# Patient Record
Sex: FEMALE | Race: WHITE | HISPANIC OR LATINO | URBAN - METROPOLITAN AREA
[De-identification: names, ages, dates, MRNs, and addresses within clinical notes are randomized per-mention and may not be internally consistent; named-entity substitution may affect disease eponyms.]

---

## 2019-09-25 LAB
EXTERNAL HIV CONFIRMATION: NORMAL
EXTERNAL HIV SCREEN: NORMAL

## 2021-06-03 PROBLEM — R31.29 MICROSCOPIC HEMATURIA: Status: ACTIVE | Noted: 2019-10-09

## 2021-06-03 PROBLEM — E55.9 VITAMIN D DEFICIENCY: Status: ACTIVE | Noted: 2019-09-25

## 2021-06-03 PROBLEM — E78.49 OTHER HYPERLIPIDEMIA: Status: ACTIVE | Noted: 2021-06-03

## 2021-06-03 PROBLEM — R76.8 POSITIVE ANA (ANTINUCLEAR ANTIBODY): Status: ACTIVE | Noted: 2019-10-09

## 2021-06-03 NOTE — PROGRESS NOTES
Assessment/Plan:  Problem List Items Addressed This Visit        Digestive    GERD (gastroesophageal reflux disease)     Stable s meds  Watch GERD diet  Endocrine    IFG (impaired fasting glucose)     Pending labs  Relevant Orders    Comprehensive metabolic panel (Completed)    Hemoglobin A1C (Completed)       Musculoskeletal and Integument    Psoriasis     Stable on Triamcinolone cream PRN  Relevant Medications    clobetasol (OLUX) 0 05 % topical foam    triamcinolone (KENALOG) 0 1 % cream    Other Relevant Orders    Vitamin D 25 hydroxy (Completed)    Scalp psoriasis     Scalp Psoriasis - Stable on Clobetasol foam PRN  Relevant Medications    clobetasol (OLUX) 0 05 % topical foam    triamcinolone (KENALOG) 0 1 % cream    Osteoarthritis     Stable s meds  Genitourinary    Microscopic hematuria     Pending labs  To consider Uro consult if recurrence? Relevant Orders    CBC and differential (Completed)    Urine culture    Urinalysis with microscopic       Other    Vitamin D deficiency     Pending labs  Relevant Orders    Comprehensive metabolic panel (Completed)    Vitamin D 25 hydroxy (Completed)    Generalized anxiety disorder       Patient is mourning the recent death of her mother  She is stable without mood medications or counseling  Smart phone jack list and counseling list provided today  Relevant Orders    TSH, 3rd generation with Free T4 reflex (Completed)    Other hyperlipidemia     Pending labs  Recommend lifestyle modifications  Relevant Orders    CBC and differential (Completed)    Comprehensive metabolic panel (Completed)    Lipid panel (Completed)    TSH, 3rd generation with Free T4 reflex (Completed)    LDL cholesterol, direct (Completed)    Obesity     Recommend lifestyle modifications            Relevant Orders    Ambulatory referral to General Surgery    TSH, 3rd generation with Free T4 reflex (Completed)    Positive HANH (antinuclear antibody)     Pending labs  To consider Rheum consult considering psoriasis and OA? Relevant Orders    HANH Screen w/ Reflex to Titer/Pattern    C-reactive protein (Completed)    Cyclic citrul peptide antibody, IgG    RF Screen w/ Reflex to Titer    Uric acid (Completed)      Other Visit Diagnoses     Annual physical exam    -  Primary    Adjustment disorder with depressed mood        Relevant Orders    CBC and differential (Completed)    TSH, 3rd generation with Free T4 reflex (Completed)    Encounter for screening mammogram for breast cancer        Relevant Orders    Mammo screening bilateral w 3d & cad    Screening for cervical cancer        Relevant Orders    Ambulatory referral to Obstetrics / Gynecology    Screening for diabetes mellitus        Relevant Orders    Hemoglobin A1C (Completed)    LDL cholesterol, direct (Completed)    Screening for cardiovascular condition        Relevant Orders    CBC and differential (Completed)    Comprehensive metabolic panel (Completed)    Lipid panel (Completed)    Need for hepatitis C screening test        Relevant Orders    Hepatitis C antibody (Completed)    Obesity (BMI 35 0-39 9 without comorbidity)        Relevant Orders    Ambulatory referral to General Surgery    TSH, 3rd generation with Free T4 reflex (Completed)    Severe obesity (BMI 35 0-39  9) with comorbidity ([address] )        Relevant Orders    Ambulatory referral to General Surgery    TSH, 3rd generation with Free T4 reflex (Completed)    BMI 39 0-39 9,adult        Relevant Orders    Ambulatory referral to General Surgery    TSH, 3rd generation with Free T4 reflex (Completed)    Gallstones        Relevant Orders    Ambulatory referral to General Surgery    RUQ abdominal pain        Relevant Orders    Ambulatory referral to General Surgery           Return in about 6 weeks (around 7/16/2021) for Recheck IFG, HL, Adjust D/O, Anxiety, Vit D Def, Micro Hematuria, HANH+, Obesity, Labs  Future Appointments   Date Time Provider Mirna Jerome   7/16/2021  8:40 AM Mukesh Ray DO [facility]        Subjective:     Celeste Payne is a 52 y o  female who presents today as a new patient for her medical conditions  New Patient    Previous PCP:  Dr Jacqueline Sanchez at [address]  Reason for Transfer:  Preference  Last seen by previous PCP:  12/4/20  Last Labs:  9/25/19  Last Physical:  Unsure  Medical Records Requested:   No      HPI:  Chief Complaint   Patient presents with    New Patient Visit     no concerns     Annual Exam     -- Above per clinical staff and reviewed  --      HPI      Today:      Controlled Substance Review    [place]MP or [place]  reviewed: No red flags were identified; safe to proceed with prescription  Gallstones / Fatty Liver - Seen on U/S 4/28/21 at [facility] - ordered by [facility]   Also has fatty liver  [facility] advised that patient see a General Surgeon  Had RUQ mid 4/21  Now feels "raw" in RUQ, denies pain  Previously she had RUQ pain c laying supine, walking, pain radiated to spine  Trying to avoid fried and fatty foods  Obesity - Trying to watch diet  No regular exercise  Hyperlipidemia - No statin previously  GERD -  Stable, except for late night eating  Uses OTC acid reducer PRN  IFG / H/O GDM - Diet-controlled  No meds previously  OA - Knees and Ankles - Stable s OTC meds  Anxiety -  Patient states mood was stable until her mother passed away 5/12/21  Good social supports  No SI/HI/AH/VH  No mood meds or counseling previously        PHQ-9 Depression Screening    PHQ-9:   Frequency of the following problems over the past two weeks:      Little interest or pleasure in doing things: 0 - not at all  Feeling down, depressed, or hopeless: 1 - several days  Trouble falling or staying asleep, or sleeping too much: 1 - several days  Feeling tired or having little energy: 0 - not at all  Poor appetite or overeatin - not at all  Feeling bad about yourself - or that you are a failure or have let yourself or your family down: 0 - not at all  Trouble concentrating on things, such as reading the newspaper or watching television: 0 - not at all  Moving or speaking so slowly that other people could have noticed  Or the opposite - being so fidgety or restless that you have been moving around a lot more than usual: 0 - not at all  Thoughts that you would be better off dead, or of hurting yourself in some way: 0 - not at all  PHQ-2 Score: 1  PHQ-9 Score: 2         GRACIELA-7 Flowsheet Screening      Most Recent Value   Over the last two weeks, how often have you been bothered by the following problems? Feeling nervous, anxious, or on edge  0   Not being able to stop or control worrying  0   Worrying too much about different things  0   Trouble relaxing   0   Being so restless that it's hard to sit still  0   Becoming easily annoyed or irritable   0   Feeling afraid as if something awful might happen  0   How difficult have these problems made it for you to do your work, take care of things at home, or get along with other people? Not difficult at all   GRACIELA Score   0        MDQ:  0    Vitamin D Deficiency - s/p Drisdol  No MVI and OTC Vitamin C currently  +HANH - No Rheum consult previously  Psoriasis - Management per PCP  Has not seen Derm in years  Behind ears and B/L nares  Stable on Triamcinolone cream PRN  Scalp Psoriasis - Management per PCP  Has not seen Derm in years  Stable on Clobetasol foam PRN  Microscopic Hematuria - No Uro evaluation previously  Reviewed:  Labs 19    No Gyn  Last saw Gyn years ago  No abnormal Paps in the past       Sees Dentist q6 months  Sees Optho q2 years            The following portions of the patient's history were reviewed and updated as appropriate: allergies, current medications, past family history, past medical history, past social history, past surgical history and problem list       Review of Systems   Constitutional: Negative for appetite change, chills, diaphoresis, fatigue and fever  Respiratory: Negative for chest tightness and shortness of breath  Cardiovascular: Negative for chest pain  Gastrointestinal: Negative for abdominal pain, blood in stool, diarrhea, nausea and vomiting  Genitourinary: Negative for dysuria  Current Outpatient Medications   Medication Sig Dispense Refill    clobetasol (OLUX) 0 05 % topical foam Apply 1 application topically 2 (two) times a day as needed For Scalp Psoriasis   triamcinolone (KENALOG) 0 1 % cream Apply 1 application topically 2 (two) times a day as needed        No current facility-administered medications for this visit  Objective:  /68   Pulse 73   Temp 97 5 °F (36 4 °C)   Resp 14   Ht 5' 4 45" (1 637 m)   Wt 105 kg (231 lb 12 8 oz)   SpO2 99%   BMI 39 24 kg/m²    Wt Readings from Last 3 Encounters:   06/04/21 105 kg (231 lb 12 8 oz)      BP Readings from Last 3 Encounters:   06/04/21 126/68          Physical Exam  Vitals signs and nursing note reviewed  Constitutional:       Appearance: Normal appearance  She is well-developed  She is obese  HENT:      Head: Normocephalic and atraumatic  Right Ear: Tympanic membrane, ear canal and external ear normal       Left Ear: Tympanic membrane, ear canal and external ear normal       Nose: Nose normal       Right Sinus: No maxillary sinus tenderness or frontal sinus tenderness  Left Sinus: No maxillary sinus tenderness or frontal sinus tenderness  Mouth/Throat:      Mouth: Mucous membranes are moist       Pharynx: Oropharynx is clear  Uvula midline  Tonsils: No tonsillar exudate  Eyes:      Extraocular Movements: Extraocular movements intact  Conjunctiva/sclera: Conjunctivae normal       Pupils: Pupils are equal, round, and reactive to light     Neck: Musculoskeletal: Neck supple  Cardiovascular:      Rate and Rhythm: Normal rate and regular rhythm  Pulses: Normal pulses  Heart sounds: Normal heart sounds  Pulmonary:      Effort: Pulmonary effort is normal       Breath sounds: Normal breath sounds  Abdominal:      General: Bowel sounds are normal  There is no distension  Palpations: Abdomen is soft  There is no mass  Tenderness: There is no abdominal tenderness  There is no right CVA tenderness, left CVA tenderness, guarding or rebound  Negative signs include Rowland's sign  Musculoskeletal:         General: No swelling or tenderness  Right lower leg: No edema  Left lower leg: No edema  Lymphadenopathy:      Cervical: No cervical adenopathy  Skin:     Findings: No rash  Neurological:      General: No focal deficit present  Mental Status: She is alert and oriented to person, place, and time  Psychiatric:         Mood and Affect: Mood normal          Behavior: Behavior normal          Thought Content: Thought content normal          Judgment: Judgment normal          Lab Results:      Lab Results   Component Value Date    WBC 6 50 06/04/2021    HGB 11 5 06/04/2021    HCT 36 7 06/04/2021     06/04/2021    TRIG 105 06/04/2021    HDL 58 06/04/2021    LDLDIRECT 165 (H) 06/04/2021    ALT 41 06/04/2021    AST 23 06/04/2021    K 4 0 06/04/2021     06/04/2021    CREATININE 0 74 06/04/2021    BUN 7 06/04/2021    CO2 24 06/04/2021    GLUF 92 06/04/2021    HGBA1C 5 6 06/04/2021     Lab Results   Component Value Date    URICACID 5 9 06/04/2021     Invalid input(s): BASENAME Vitamin D    No results found  POCT Labs      BMI Counseling: Body mass index is 39 24 kg/m²  The BMI is above normal  Nutrition recommendations include encouraging healthy choices of fruits and vegetables  Exercise recommendations include exercising 3-5 times per week  No pharmacotherapy was ordered         Depression Screening and Follow-up Plan: Patient's depression screening was positive with a PHQ-2 score of 1  Their PHQ-9 score was 2  Patient advised to follow-up with PCP for further management

## 2021-06-04 ENCOUNTER — TELEPHONE (OUTPATIENT)
Dept: ADMINISTRATIVE | Facility: OTHER | Age: 51
End: 2021-06-04

## 2021-06-04 ENCOUNTER — OFFICE VISIT (OUTPATIENT)
Dept: FAMILY MEDICINE CLINIC | Facility: CLINIC | Age: 51
End: 2021-06-04
Payer: COMMERCIAL

## 2021-06-04 ENCOUNTER — LAB (OUTPATIENT)
Dept: LAB | Facility: AMBULARY SURGERY CENTER | Age: 51
End: 2021-06-04
Payer: COMMERCIAL

## 2021-06-04 VITALS
SYSTOLIC BLOOD PRESSURE: 126 MMHG | HEART RATE: 73 BPM | DIASTOLIC BLOOD PRESSURE: 68 MMHG | TEMPERATURE: 97.5 F | OXYGEN SATURATION: 99 % | RESPIRATION RATE: 14 BRPM | WEIGHT: 231.8 LBS | HEIGHT: 64 IN | BODY MASS INDEX: 39.57 KG/M2

## 2021-06-04 DIAGNOSIS — F41.1 GENERALIZED ANXIETY DISORDER: ICD-10-CM

## 2021-06-04 DIAGNOSIS — F43.21 ADJUSTMENT DISORDER WITH DEPRESSED MOOD: ICD-10-CM

## 2021-06-04 DIAGNOSIS — E66.9 OBESITY (BMI 35.0-39.9 WITHOUT COMORBIDITY): ICD-10-CM

## 2021-06-04 DIAGNOSIS — Z11.59 NEED FOR HEPATITIS C SCREENING TEST: ICD-10-CM

## 2021-06-04 DIAGNOSIS — Z12.31 ENCOUNTER FOR SCREENING MAMMOGRAM FOR BREAST CANCER: ICD-10-CM

## 2021-06-04 DIAGNOSIS — K21.9 GASTROESOPHAGEAL REFLUX DISEASE, UNSPECIFIED WHETHER ESOPHAGITIS PRESENT: ICD-10-CM

## 2021-06-04 DIAGNOSIS — E66.01 CLASS 2 SEVERE OBESITY WITH SERIOUS COMORBIDITY AND BODY MASS INDEX (BMI) OF 39.0 TO 39.9 IN ADULT, UNSPECIFIED OBESITY TYPE (HCC): ICD-10-CM

## 2021-06-04 DIAGNOSIS — E55.9 VITAMIN D DEFICIENCY: ICD-10-CM

## 2021-06-04 DIAGNOSIS — R31.29 MICROSCOPIC HEMATURIA: ICD-10-CM

## 2021-06-04 DIAGNOSIS — Z00.00 ANNUAL PHYSICAL EXAM: Primary | ICD-10-CM

## 2021-06-04 DIAGNOSIS — K80.20 GALLSTONES: ICD-10-CM

## 2021-06-04 DIAGNOSIS — M15.9 OSTEOARTHRITIS OF MULTIPLE JOINTS, UNSPECIFIED OSTEOARTHRITIS TYPE: ICD-10-CM

## 2021-06-04 DIAGNOSIS — L40.9 PSORIASIS: ICD-10-CM

## 2021-06-04 DIAGNOSIS — E66.01 SEVERE OBESITY (BMI 35.0-39.9) WITH COMORBIDITY (HCC): ICD-10-CM

## 2021-06-04 DIAGNOSIS — E78.49 OTHER HYPERLIPIDEMIA: ICD-10-CM

## 2021-06-04 DIAGNOSIS — Z13.1 SCREENING FOR DIABETES MELLITUS: ICD-10-CM

## 2021-06-04 DIAGNOSIS — L40.9 SCALP PSORIASIS: ICD-10-CM

## 2021-06-04 DIAGNOSIS — Z13.6 SCREENING FOR CARDIOVASCULAR CONDITION: ICD-10-CM

## 2021-06-04 DIAGNOSIS — Z12.4 SCREENING FOR CERVICAL CANCER: ICD-10-CM

## 2021-06-04 DIAGNOSIS — R10.11 RUQ ABDOMINAL PAIN: ICD-10-CM

## 2021-06-04 DIAGNOSIS — R76.8 POSITIVE ANA (ANTINUCLEAR ANTIBODY): ICD-10-CM

## 2021-06-04 DIAGNOSIS — R73.01 IFG (IMPAIRED FASTING GLUCOSE): ICD-10-CM

## 2021-06-04 LAB
25(OH)D3 SERPL-MCNC: 18.2 NG/ML (ref 30–100)
ALBUMIN SERPL BCP-MCNC: 4.1 G/DL (ref 3.5–5)
ALP SERPL-CCNC: 106 U/L (ref 46–116)
ALT SERPL W P-5'-P-CCNC: 41 U/L (ref 12–78)
ANION GAP SERPL CALCULATED.3IONS-SCNC: 6 MMOL/L (ref 4–13)
AST SERPL W P-5'-P-CCNC: 23 U/L (ref 5–45)
BACTERIA UR QL AUTO: NORMAL /HPF
BASOPHILS # BLD AUTO: 0.04 THOUSANDS/ΜL (ref 0–0.1)
BASOPHILS NFR BLD AUTO: 1 % (ref 0–1)
BILIRUB SERPL-MCNC: 0.6 MG/DL (ref 0.2–1)
BILIRUB UR QL STRIP: NEGATIVE
BUN SERPL-MCNC: 7 MG/DL (ref 5–25)
CALCIUM SERPL-MCNC: 9.1 MG/DL (ref 8.3–10.1)
CHLORIDE SERPL-SCNC: 108 MMOL/L (ref 100–108)
CHOLEST SERPL-MCNC: 241 MG/DL (ref 50–200)
CLARITY UR: CLEAR
CO2 SERPL-SCNC: 24 MMOL/L (ref 21–32)
COLOR UR: YELLOW
CREAT SERPL-MCNC: 0.74 MG/DL (ref 0.6–1.3)
CRP SERPL QL: 6.5 MG/L
EOSINOPHIL # BLD AUTO: 0.32 THOUSAND/ΜL (ref 0–0.61)
EOSINOPHIL NFR BLD AUTO: 5 % (ref 0–6)
ERYTHROCYTE [DISTWIDTH] IN BLOOD BY AUTOMATED COUNT: 16.1 % (ref 11.6–15.1)
EST. AVERAGE GLUCOSE BLD GHB EST-MCNC: 114 MG/DL
GFR SERPL CREATININE-BSD FRML MDRD: 96 ML/MIN/1.73SQ M
GLUCOSE P FAST SERPL-MCNC: 92 MG/DL (ref 65–99)
GLUCOSE UR STRIP-MCNC: NEGATIVE MG/DL
HBA1C MFR BLD: 5.6 %
HCT VFR BLD AUTO: 36.7 % (ref 34.8–46.1)
HCV AB SER QL: NORMAL
HDLC SERPL-MCNC: 58 MG/DL
HGB BLD-MCNC: 11.5 G/DL (ref 11.5–15.4)
HGB UR QL STRIP.AUTO: NEGATIVE
HYALINE CASTS #/AREA URNS LPF: NORMAL /LPF
IMM GRANULOCYTES # BLD AUTO: 0.01 THOUSAND/UL (ref 0–0.2)
IMM GRANULOCYTES NFR BLD AUTO: 0 % (ref 0–2)
KETONES UR STRIP-MCNC: NEGATIVE MG/DL
LDLC SERPL CALC-MCNC: 162 MG/DL (ref 0–100)
LDLC SERPL DIRECT ASSAY-MCNC: 165 MG/DL (ref 0–100)
LEUKOCYTE ESTERASE UR QL STRIP: NEGATIVE
LYMPHOCYTES # BLD AUTO: 2.41 THOUSANDS/ΜL (ref 0.6–4.47)
LYMPHOCYTES NFR BLD AUTO: 37 % (ref 14–44)
MCH RBC QN AUTO: 27.5 PG (ref 26.8–34.3)
MCHC RBC AUTO-ENTMCNC: 31.3 G/DL (ref 31.4–37.4)
MCV RBC AUTO: 88 FL (ref 82–98)
MONOCYTES # BLD AUTO: 0.38 THOUSAND/ΜL (ref 0.17–1.22)
MONOCYTES NFR BLD AUTO: 6 % (ref 4–12)
NEUTROPHILS # BLD AUTO: 3.34 THOUSANDS/ΜL (ref 1.85–7.62)
NEUTS SEG NFR BLD AUTO: 51 % (ref 43–75)
NITRITE UR QL STRIP: NEGATIVE
NON-SQ EPI CELLS URNS QL MICRO: NORMAL /HPF
NONHDLC SERPL-MCNC: 183 MG/DL
NRBC BLD AUTO-RTO: 0 /100 WBCS
PH UR STRIP.AUTO: 7 [PH]
PLATELET # BLD AUTO: 351 THOUSANDS/UL (ref 149–390)
PMV BLD AUTO: 9.7 FL (ref 8.9–12.7)
POTASSIUM SERPL-SCNC: 4 MMOL/L (ref 3.5–5.3)
PROT SERPL-MCNC: 8.2 G/DL (ref 6.4–8.2)
PROT UR STRIP-MCNC: NEGATIVE MG/DL
RBC # BLD AUTO: 4.18 MILLION/UL (ref 3.81–5.12)
RBC #/AREA URNS AUTO: NORMAL /HPF
SODIUM SERPL-SCNC: 138 MMOL/L (ref 136–145)
SP GR UR STRIP.AUTO: 1 (ref 1–1.03)
TRIGL SERPL-MCNC: 105 MG/DL
TSH SERPL DL<=0.05 MIU/L-ACNC: 1.26 UIU/ML (ref 0.36–3.74)
URATE SERPL-MCNC: 5.9 MG/DL (ref 2–6.8)
UROBILINOGEN UR QL STRIP.AUTO: 0.2 E.U./DL
WBC # BLD AUTO: 6.5 THOUSAND/UL (ref 4.31–10.16)
WBC #/AREA URNS AUTO: NORMAL /HPF

## 2021-06-04 PROCEDURE — 3725F SCREEN DEPRESSION PERFORMED: CPT | Performed by: FAMILY MEDICINE

## 2021-06-04 PROCEDURE — 87086 URINE CULTURE/COLONY COUNT: CPT | Performed by: FAMILY MEDICINE

## 2021-06-04 PROCEDURE — 86430 RHEUMATOID FACTOR TEST QUAL: CPT

## 2021-06-04 PROCEDURE — 83036 HEMOGLOBIN GLYCOSYLATED A1C: CPT

## 2021-06-04 PROCEDURE — 83721 ASSAY OF BLOOD LIPOPROTEIN: CPT

## 2021-06-04 PROCEDURE — 86140 C-REACTIVE PROTEIN: CPT

## 2021-06-04 PROCEDURE — 85025 COMPLETE CBC W/AUTO DIFF WBC: CPT

## 2021-06-04 PROCEDURE — 86038 ANTINUCLEAR ANTIBODIES: CPT

## 2021-06-04 PROCEDURE — 1036F TOBACCO NON-USER: CPT | Performed by: FAMILY MEDICINE

## 2021-06-04 PROCEDURE — 82306 VITAMIN D 25 HYDROXY: CPT

## 2021-06-04 PROCEDURE — 86803 HEPATITIS C AB TEST: CPT

## 2021-06-04 PROCEDURE — 81001 URINALYSIS AUTO W/SCOPE: CPT | Performed by: FAMILY MEDICINE

## 2021-06-04 PROCEDURE — 80061 LIPID PANEL: CPT

## 2021-06-04 PROCEDURE — 99203 OFFICE O/P NEW LOW 30 MIN: CPT | Performed by: FAMILY MEDICINE

## 2021-06-04 PROCEDURE — 36415 COLL VENOUS BLD VENIPUNCTURE: CPT

## 2021-06-04 PROCEDURE — 99386 PREV VISIT NEW AGE 40-64: CPT | Performed by: FAMILY MEDICINE

## 2021-06-04 PROCEDURE — 84550 ASSAY OF BLOOD/URIC ACID: CPT

## 2021-06-04 PROCEDURE — 86200 CCP ANTIBODY: CPT

## 2021-06-04 PROCEDURE — 84443 ASSAY THYROID STIM HORMONE: CPT

## 2021-06-04 PROCEDURE — 80053 COMPREHEN METABOLIC PANEL: CPT

## 2021-06-04 RX ORDER — TRIAMCINOLONE ACETONIDE 1 MG/G
1 CREAM TOPICAL 2 TIMES DAILY PRN
COMMUNITY

## 2021-06-04 RX ORDER — CLOBETASOL PROPIONATE 0.5 MG/G
1 AEROSOL, FOAM TOPICAL 2 TIMES DAILY PRN
COMMUNITY
Start: 2021-05-24 | End: 2021-07-16 | Stop reason: SDUPTHER

## 2021-06-04 NOTE — RESULT ENCOUNTER NOTE
Normal urinalysis  No blood seen  Urine culture is still pending      Message sent to patient via [employer] patient portal

## 2021-06-04 NOTE — RESULT ENCOUNTER NOTE
Unstable labs - will review with patient at upcoming appointment  Hyperlipidemia - Recommend lifestyle modifications  10 year ASCVD risk is 1 2%  +CRP - Patient c H/O +HANH, Psoriasis, OA  To consider Rheum consult?

## 2021-06-04 NOTE — ASSESSMENT & PLAN NOTE
Patient is mourning the recent death of her mother  She is stable without mood medications or counseling  Smart phone jack list and counseling list provided today

## 2021-06-04 NOTE — PROGRESS NOTES
Assessment/Plan:  Problem List Items Addressed This Visit        Digestive    GERD (gastroesophageal reflux disease)     Stable s meds  Watch GERD diet  Endocrine    IFG (impaired fasting glucose)     Pending labs  Relevant Orders    Comprehensive metabolic panel (Completed)    Hemoglobin A1C (Completed)       Musculoskeletal and Integument    Psoriasis     Stable on Triamcinolone cream PRN  Relevant Medications    clobetasol (OLUX) 0 05 % topical foam    triamcinolone (KENALOG) 0 1 % cream    Other Relevant Orders    Vitamin D 25 hydroxy (Completed)    Scalp psoriasis     Scalp Psoriasis - Stable on Clobetasol foam PRN  Relevant Medications    clobetasol (OLUX) 0 05 % topical foam    triamcinolone (KENALOG) 0 1 % cream    Osteoarthritis     Stable s meds  Genitourinary    Microscopic hematuria     Pending labs  To consider Uro consult if recurrence? Relevant Orders    CBC and differential (Completed)    Urine culture    Urinalysis with microscopic       Other    Vitamin D deficiency     Pending labs  Relevant Orders    Comprehensive metabolic panel (Completed)    Vitamin D 25 hydroxy (Completed)    Generalized anxiety disorder       Patient is mourning the recent death of her mother  She is stable without mood medications or counseling  Smart phone jack list and counseling list provided today  Relevant Orders    TSH, 3rd generation with Free T4 reflex (Completed)    Other hyperlipidemia     Pending labs  Recommend lifestyle modifications  Relevant Orders    CBC and differential (Completed)    Comprehensive metabolic panel (Completed)    Lipid panel (Completed)    TSH, 3rd generation with Free T4 reflex (Completed)    LDL cholesterol, direct (Completed)    Obesity     Recommend lifestyle modifications            Relevant Orders    Ambulatory referral to General Surgery    TSH, 3rd generation with Free T4 reflex (Completed)    Positive HANH (antinuclear antibody)     Pending labs  To consider Rheum consult considering psoriasis and OA? Relevant Orders    HANH Screen w/ Reflex to Titer/Pattern    C-reactive protein (Completed)    Cyclic citrul peptide antibody, IgG    RF Screen w/ Reflex to Titer    Uric acid (Completed)      Other Visit Diagnoses     Annual physical exam    -  Primary    Adjustment disorder with depressed mood        Relevant Orders    CBC and differential (Completed)    TSH, 3rd generation with Free T4 reflex (Completed)      Patient is mourning the recent death of her mother  She is stable without mood medications or counseling  Smart phone jack list and counseling list provided today  Encounter for screening mammogram for breast cancer        Relevant Orders    Mammo screening bilateral w 3d & cad    Screening for cervical cancer        Relevant Orders    Ambulatory referral to Obstetrics / Gynecology    Screening for diabetes mellitus        Relevant Orders    Hemoglobin A1C (Completed)    LDL cholesterol, direct (Completed)    Screening for cardiovascular condition        Relevant Orders    CBC and differential (Completed)    Comprehensive metabolic panel (Completed)    Lipid panel (Completed)    Need for hepatitis C screening test        Relevant Orders    Hepatitis C antibody (Completed)    Obesity (BMI 35 0-39 9 without comorbidity)        Relevant Orders    Ambulatory referral to General Surgery    TSH, 3rd generation with Free T4 reflex (Completed)    Severe obesity (BMI 35 0-39  9) with comorbidity ([address] )        Relevant Orders    Ambulatory referral to General Surgery    TSH, 3rd generation with Free T4 reflex (Completed)    BMI 39 0-39 9,adult        Relevant Orders    Ambulatory referral to General Surgery    TSH, 3rd generation with Free T4 reflex (Completed)    Gallstones        Relevant Orders    Ambulatory referral to General Surgery    To consider cholecystectomy due to symptomatic gallstones? RUQ abdominal pain        Relevant Orders    Ambulatory referral to General Surgery    To consider cholecystectomy due to symptomatic gallstones? Return in about 6 weeks (around 7/16/2021) for Recheck IFG, HL, Adjust D/O, Anxiety, Vit D Def, Micro Hematuria, HANH+, Obesity, Labs  Future Appointments   Date Time Provider Mirna Jerome   7/16/2021  8:40 AM Unice Davy, DO [facility]        Subjective:     Jennifer Fitzgerald is a 52 y o  female who presents today for a follow-up on her chronic medical conditions  HPI:  Chief Complaint   Patient presents with    New Patient Visit     no concerns     Annual Exam     -- Above per clinical staff and reviewed  --      HPI      Today:      Physical      Trying to watch diet  No regular exercise  No Gyn  Last saw Gyn years ago  No abnormal Paps in the past        Sees Dentist q6 months  Sees Optho q2 years        Health Maintenance   Topic Date Due    Hepatitis C Screening  Never done    MAMMOGRAM  Never done    Depression Screening PHQ  Never done    BMI: Followup Plan  Never done    Annual Physical  Never done    Cervical Cancer Screening  Never done    COVID-19 Vaccine (1) 09/04/2021 (Originally 9/16/1982)    Influenza Vaccine (Season Ended) 06/04/2022 (Originally 9/1/2021)    HIV Screening  06/05/2023 (Originally 9/16/1987)    BMI: Adult  06/04/2022    DTaP,Tdap,and Td Vaccines (2 - Td) 04/03/2024    Pneumococcal Vaccine: Pediatrics (0 to 5 Years) and At-Risk Patients (6 to 59 Years)  Aged Out    HIB Vaccine  Aged Out    Hepatitis B Vaccine  Aged Out    IPV Vaccine  Aged Out    Hepatitis A Vaccine  Aged Out    Meningococcal ACWY Vaccine  Aged Out    HPV Vaccine  Aged Out         The following portions of the patient's history were reviewed and updated as appropriate: allergies, current medications, past family history, past medical history, past social history, past surgical history and problem list       Review of Systems     See other note  Current Outpatient Medications   Medication Sig Dispense Refill    clobetasol (OLUX) 0 05 % topical foam Apply 1 application topically 2 (two) times a day as needed For Scalp Psoriasis   triamcinolone (KENALOG) 0 1 % cream Apply 1 application topically 2 (two) times a day as needed        No current facility-administered medications for this visit  Objective:  /68   Pulse 73   Temp 97 5 °F (36 4 °C)   Resp 14   Ht 5' 4 45" (1 637 m)   Wt 105 kg (231 lb 12 8 oz)   SpO2 99%   BMI 39 24 kg/m²    Wt Readings from Last 3 Encounters:   06/04/21 105 kg (231 lb 12 8 oz)      BP Readings from Last 3 Encounters:   06/04/21 126/68          Physical Exam     Vitals signs and nursing note reviewed  Constitutional:   Appearance: Normal appearance  She is well-developed  She is obese  HENT:   Head: Normocephalic and atraumatic  Right Ear: Tympanic membrane, ear canal and external ear normal    Left Ear: Tympanic membrane, ear canal and external ear normal    Nose: Nose normal    Right Sinus: No maxillary sinus tenderness or frontal sinus tenderness  Left Sinus: No maxillary sinus tenderness or frontal sinus tenderness  Mouth/Throat:   Mouth: Mucous membranes are moist    Pharynx: Oropharynx is clear  Uvula midline  Tonsils: No tonsillar exudate  Eyes:   Extraocular Movements: Extraocular movements intact  Conjunctiva/sclera: Conjunctivae normal    Pupils: Pupils are equal, round, and reactive to light  Neck:   Musculoskeletal: Neck supple  Cardiovascular:   Rate and Rhythm: Normal rate and regular rhythm  Pulses: Normal pulses  Heart sounds: Normal heart sounds  Pulmonary:   Effort: Pulmonary effort is normal    Breath sounds: Normal breath sounds  Abdominal:   General: Bowel sounds are normal  There is no distension  Palpations: Abdomen is soft  There is no mass  Tenderness:  There is no abdominal tenderness  There is no right CVA tenderness, left CVA tenderness, guarding or rebound  Negative signs include Rowland's sign  Musculoskeletal:   General: No swelling or tenderness  Right lower leg: No edema  Left lower leg: No edema  Lymphadenopathy:   Cervical: No cervical adenopathy  Skin:   Findings: No rash  Neurological:   General: No focal deficit present  Mental Status: She is alert and oriented to person, place, and time  Psychiatric:   Mood and Affect: Mood normal    Behavior: Behavior normal    Thought Content: Thought content normal    Judgment: Judgment normal        Lab Results:      Lab Results   Component Value Date    WBC 6 50 06/04/2021    HGB 11 5 06/04/2021    HCT 36 7 06/04/2021     06/04/2021    TRIG 105 06/04/2021    HDL 58 06/04/2021    LDLDIRECT 165 (H) 06/04/2021    ALT 41 06/04/2021    AST 23 06/04/2021    K 4 0 06/04/2021     06/04/2021    CREATININE 0 74 06/04/2021    BUN 7 06/04/2021    CO2 24 06/04/2021    GLUF 92 06/04/2021    HGBA1C 5 6 06/04/2021     Lab Results   Component Value Date    URICACID 5 9 06/04/2021     Invalid input(s): BASENAME Vitamin D    No results found       POCT Labs

## 2021-06-04 NOTE — TELEPHONE ENCOUNTER
Upon review of the In Basket request we were able to locate, review, and update the patient chart as requested for HIV  Any additional questions or concerns should be emailed to the Practice Liaisons via [email or web address] email, please do not reply via In Basket      Thank you  Raymond Johnson

## 2021-06-04 NOTE — TELEPHONE ENCOUNTER
----- Message from Malcolm Holland DO sent at 6/3/2021  4:02 PM EDT -----  Regarding: HIV 9/25/19 @ [facility]  06/03/21 4:02 PM    Hello, our patient Golden Villareal has had HIV completed/performed  Please assist in updating the patient chart by pulling the Care Everywhere (CE) document  The date of service is       [facility]    Thank you,  Crys Yeager DO  [facility]  Barbi Monet

## 2021-06-04 NOTE — RESULT ENCOUNTER NOTE
Unstable labs - will review with patient at upcoming appointment  Vitamin D Deficiency - Recommend start multivitamin and prescription vitamin D (Drisdol)  When 12 weeks of Drisdol completed, continue multivitamin and start over-the-counter Vitamin D3 1,000-3,000 International Units daily  Check vitamin D level 1 week afterwards completing Drisdol

## 2021-06-07 LAB
BACTERIA UR CULT: NORMAL
CCP AB SER IA-ACNC: 0.5
RHEUMATOID FACT SER QL LA: NEGATIVE
RYE IGE QN: NEGATIVE

## 2021-06-07 NOTE — RESULT ENCOUNTER NOTE
Negative urine culture  No urinary tract infection seen  Continue plan of care      Message sent to patient via [employer] patient portal

## 2021-06-07 NOTE — RESULT ENCOUNTER NOTE
Negative Rheumatoid Factor  Continue plan of care        Message sent to patient via [employer] patient portal

## 2021-06-07 NOTE — RESULT ENCOUNTER NOTE
Negative HANH, which is a rheumatologic lab  Continue plan of care      Message sent to patient via [employer] patient portal

## 2021-06-07 NOTE — RESULT ENCOUNTER NOTE
Negative CCP, which is rheumatologic lab  Continue plan of care      Message sent to patient via [employer] patient portal

## 2021-06-16 ENCOUNTER — CONSULT (OUTPATIENT)
Dept: SURGERY | Facility: CLINIC | Age: 51
End: 2021-06-16
Payer: COMMERCIAL

## 2021-06-16 VITALS
HEIGHT: 64 IN | TEMPERATURE: 99.6 F | SYSTOLIC BLOOD PRESSURE: 124 MMHG | DIASTOLIC BLOOD PRESSURE: 66 MMHG | HEART RATE: 87 BPM | BODY MASS INDEX: 41.48 KG/M2 | WEIGHT: 243 LBS

## 2021-06-16 DIAGNOSIS — K80.20 GALLSTONES: Primary | ICD-10-CM

## 2021-06-16 DIAGNOSIS — R10.11 RUQ ABDOMINAL PAIN: ICD-10-CM

## 2021-06-16 DIAGNOSIS — E66.9 OBESITY (BMI 35.0-39.9 WITHOUT COMORBIDITY): ICD-10-CM

## 2021-06-16 DIAGNOSIS — E66.01 SEVERE OBESITY (BMI 35.0-39.9) WITH COMORBIDITY (HCC): ICD-10-CM

## 2021-06-16 DIAGNOSIS — K42.9 UMBILICAL HERNIA: ICD-10-CM

## 2021-06-16 DIAGNOSIS — E66.01 CLASS 2 SEVERE OBESITY WITH SERIOUS COMORBIDITY AND BODY MASS INDEX (BMI) OF 39.0 TO 39.9 IN ADULT, UNSPECIFIED OBESITY TYPE (HCC): ICD-10-CM

## 2021-06-16 PROCEDURE — 99204 OFFICE O/P NEW MOD 45 MIN: CPT | Performed by: SURGERY

## 2021-06-16 PROCEDURE — 1036F TOBACCO NON-USER: CPT | Performed by: SURGERY

## 2021-06-16 NOTE — H&P (VIEW-ONLY)
Assessment/Plan: I reviewed patient's ultrasound report  She has got multiple gallstones  Based upon her symptomatology and her ultrasound report I think she would benefit from laparoscopic cholecystectomy  She also has a small umbilical hernia which will repair at the same time  I explained the procedure to her along with possible complications  We discuss complications like infection, bleeding, conversion to open surgery, bile leak, CBD injury  She verbalized understanding  She will be scheduled for surgery on  at [facility]  No problem-specific Assessment & Plan notes found for this encounter  Diagnoses and all orders for this visit:    Gallstones  -     Ambulatory referral to General Surgery    Class 2 severe obesity with serious comorbidity and body mass index (BMI) of 39 0 to 39 9 in adult, unspecified obesity type ([facility] [address] )  -     Ambulatory referral to General Surgery    Obesity (BMI 35 0-39 9 without comorbidity)  -     Ambulatory referral to General Surgery    Severe obesity (BMI 35 0-39  9) with comorbidity ([facility] [address] )  -     Ambulatory referral to General Surgery    BMI 39 0-39 9,adult  -     Ambulatory referral to General Surgery    RUQ abdominal pain  -     Ambulatory referral to General Surgery    Umbilical hernia          Subjective:      Patient ID: Mami Corbett is a 52 y o  female  53-year-old female patient came to my office with complaints of right upper quadrant pain  She says she had it few weeks ago  It was sudden in nature radiated to her back and shoulder  There was no nausea or vomiting  She had 1 more episode which was milder in nature few years ago  Patient had an ultrasound done and was found to have gallstones  No complaints  Of diarrhea constipation  Her prior surgery has been         The following portions of the patient's history were reviewed and updated as appropriate:   She  has a past medical history of COVID-19 (2020), Generalized anxiety disorder (2015), GERD (gastroesophageal reflux disease), History of gestational diabetes (, ), IFG (impaired fasting glucose), Obesity, Osteoarthritis (), Other hyperlipidemia (6/3/2021), Psoriasis (2015), Scalp psoriasis (2021), and Vitamin D deficiency (2019)  She   Patient Active Problem List    Diagnosis Date Noted    Scalp psoriasis 2021    GERD (gastroesophageal reflux disease)     Obesity     Other hyperlipidemia 2021    IFG (impaired fasting glucose)     Positive HANH (antinuclear antibody) 10/09/2019    Microscopic hematuria 10/09/2019    Vitamin D deficiency 2019    Generalized anxiety disorder 2015    Psoriasis 2015    Osteoarthritis      She  has a past surgical history that includes  section ( & ); ARTHROSCOPY ANKLE (Right, 2016); and Tonsillectomy  Her family history includes Coronary artery disease (age of onset: 32) in her father; Diabetes in her maternal grandmother, paternal grandfather, and paternal grandmother; Diabetes (age of onset: 36) in her father; Heart attack (age of onset: 32) in her father; Heart defect in her mother; Heart disease in her paternal grandfather and paternal grandmother; Kidney disease in her maternal grandmother; Muscular dystrophy (age of onset: 32) in her mother; No Known Problems in her brother, maternal grandfather, son, and son  She  reports that she quit smoking about 11 years ago  Her smoking use included cigarettes  She started smoking about 35 years ago  She has a 10 00 pack-year smoking history  She has never used smokeless tobacco  She reports current alcohol use of about 1 0 standard drinks of alcohol per week  She reports previous drug use  Drug: Marijuana  Current Outpatient Medications   Medication Sig Dispense Refill    clobetasol (OLUX) 0 05 % topical foam Apply 1 application topically 2 (two) times a day as needed For Scalp Psoriasis        triamcinolone (KENALOG) 0 1 % cream Apply 1 application topically 2 (two) times a day as needed        No current facility-administered medications for this visit  Current Outpatient Medications on File Prior to Visit   Medication Sig    clobetasol (OLUX) 0 05 % topical foam Apply 1 application topically 2 (two) times a day as needed For Scalp Psoriasis   triamcinolone (KENALOG) 0 1 % cream Apply 1 application topically 2 (two) times a day as needed      No current facility-administered medications on file prior to visit  She has No Known Allergies       Review of Systems   Constitutional: Negative  HENT: Negative  Eyes: Negative  Respiratory: Negative  Cardiovascular: Negative  Gastrointestinal: Positive for abdominal pain  Negative for abdominal distention, anal bleeding, blood in stool, constipation, diarrhea, nausea, rectal pain and vomiting  Endocrine: Negative  Genitourinary: Negative  Musculoskeletal: Negative  Skin: Negative  Allergic/Immunologic: Negative  Neurological: Negative  Hematological: Negative  Psychiatric/Behavioral: Negative  Objective:      /66   Pulse 87   Temp 99 6 °F (37 6 °C)   Ht 5' 4 45" (1 637 m)   Wt 110 kg (243 lb)   BMI 41 13 kg/m²          Physical Exam  Vitals reviewed  Constitutional:       Appearance: She is obese  HENT:      Head: Normocephalic and atraumatic  Nose: Nose normal    Eyes:      General: No scleral icterus  Pupils: Pupils are equal, round, and reactive to light  Cardiovascular:      Rate and Rhythm: Normal rate and regular rhythm  Pulses: Normal pulses  Heart sounds: Normal heart sounds  Pulmonary:      Effort: Pulmonary effort is normal       Breath sounds: Normal breath sounds  Abdominal:      General: Abdomen is flat  Bowel sounds are normal       Palpations: Abdomen is soft  Hernia: A hernia (Reducible umbilical hernia) is present     Musculoskeletal:         General: Normal range of motion  Cervical back: Normal range of motion  Skin:     General: Skin is warm  Neurological:      General: No focal deficit present  Mental Status: She is alert and oriented to person, place, and time     Psychiatric:         Mood and Affect: Mood normal          Behavior: Behavior normal

## 2021-06-16 NOTE — PROGRESS NOTES
Assessment/Plan: I reviewed patient's ultrasound report  She has got multiple gallstones  Based upon her symptomatology and her ultrasound report I think she would benefit from laparoscopic cholecystectomy  She also has a small umbilical hernia which will repair at the same time  I explained the procedure to her along with possible complications  We discuss complications like infection, bleeding, conversion to open surgery, bile leak, CBD injury  She verbalized understanding  She will be scheduled for surgery on  at [facility]  No problem-specific Assessment & Plan notes found for this encounter  Diagnoses and all orders for this visit:    Gallstones  -     Ambulatory referral to General Surgery    Class 2 severe obesity with serious comorbidity and body mass index (BMI) of 39 0 to 39 9 in adult, unspecified obesity type ([facility] [address] )  -     Ambulatory referral to General Surgery    Obesity (BMI 35 0-39 9 without comorbidity)  -     Ambulatory referral to General Surgery    Severe obesity (BMI 35 0-39  9) with comorbidity ([facility] [address] )  -     Ambulatory referral to General Surgery    BMI 39 0-39 9,adult  -     Ambulatory referral to General Surgery    RUQ abdominal pain  -     Ambulatory referral to General Surgery    Umbilical hernia          Subjective:      Patient ID: Santino Mcdaniel is a 52 y o  female  82-year-old female patient came to my office with complaints of right upper quadrant pain  She says she had it few weeks ago  It was sudden in nature radiated to her back and shoulder  There was no nausea or vomiting  She had 1 more episode which was milder in nature few years ago  Patient had an ultrasound done and was found to have gallstones  No complaints  Of diarrhea constipation  Her prior surgery has been         The following portions of the patient's history were reviewed and updated as appropriate:   She  has a past medical history of COVID-19 (2020), Generalized anxiety disorder (2015), GERD (gastroesophageal reflux disease), History of gestational diabetes (, ), IFG (impaired fasting glucose), Obesity, Osteoarthritis (), Other hyperlipidemia (6/3/2021), Psoriasis (2015), Scalp psoriasis (2021), and Vitamin D deficiency (2019)  She   Patient Active Problem List    Diagnosis Date Noted    Scalp psoriasis 2021    GERD (gastroesophageal reflux disease)     Obesity     Other hyperlipidemia 2021    IFG (impaired fasting glucose)     Positive HANH (antinuclear antibody) 10/09/2019    Microscopic hematuria 10/09/2019    Vitamin D deficiency 2019    Generalized anxiety disorder 2015    Psoriasis 2015    Osteoarthritis      She  has a past surgical history that includes  section ( & ); ARTHROSCOPY ANKLE (Right, 2016); and Tonsillectomy  Her family history includes Coronary artery disease (age of onset: 32) in her father; Diabetes in her maternal grandmother, paternal grandfather, and paternal grandmother; Diabetes (age of onset: 36) in her father; Heart attack (age of onset: 32) in her father; Heart defect in her mother; Heart disease in her paternal grandfather and paternal grandmother; Kidney disease in her maternal grandmother; Muscular dystrophy (age of onset: 32) in her mother; No Known Problems in her brother, maternal grandfather, son, and son  She  reports that she quit smoking about 11 years ago  Her smoking use included cigarettes  She started smoking about 35 years ago  She has a 10 00 pack-year smoking history  She has never used smokeless tobacco  She reports current alcohol use of about 1 0 standard drinks of alcohol per week  She reports previous drug use  Drug: Marijuana  Current Outpatient Medications   Medication Sig Dispense Refill    clobetasol (OLUX) 0 05 % topical foam Apply 1 application topically 2 (two) times a day as needed For Scalp Psoriasis        triamcinolone (KENALOG) 0 1 % cream Apply 1 application topically 2 (two) times a day as needed        No current facility-administered medications for this visit  Current Outpatient Medications on File Prior to Visit   Medication Sig    clobetasol (OLUX) 0 05 % topical foam Apply 1 application topically 2 (two) times a day as needed For Scalp Psoriasis   triamcinolone (KENALOG) 0 1 % cream Apply 1 application topically 2 (two) times a day as needed      No current facility-administered medications on file prior to visit  She has No Known Allergies       Review of Systems   Constitutional: Negative  HENT: Negative  Eyes: Negative  Respiratory: Negative  Cardiovascular: Negative  Gastrointestinal: Positive for abdominal pain  Negative for abdominal distention, anal bleeding, blood in stool, constipation, diarrhea, nausea, rectal pain and vomiting  Endocrine: Negative  Genitourinary: Negative  Musculoskeletal: Negative  Skin: Negative  Allergic/Immunologic: Negative  Neurological: Negative  Hematological: Negative  Psychiatric/Behavioral: Negative  Objective:      /66   Pulse 87   Temp 99 6 °F (37 6 °C)   Ht 5' 4 45" (1 637 m)   Wt 110 kg (243 lb)   BMI 41 13 kg/m²          Physical Exam  Vitals reviewed  Constitutional:       Appearance: She is obese  HENT:      Head: Normocephalic and atraumatic  Nose: Nose normal    Eyes:      General: No scleral icterus  Pupils: Pupils are equal, round, and reactive to light  Cardiovascular:      Rate and Rhythm: Normal rate and regular rhythm  Pulses: Normal pulses  Heart sounds: Normal heart sounds  Pulmonary:      Effort: Pulmonary effort is normal       Breath sounds: Normal breath sounds  Abdominal:      General: Abdomen is flat  Bowel sounds are normal       Palpations: Abdomen is soft  Hernia: A hernia (Reducible umbilical hernia) is present     Musculoskeletal:         General: Normal range of motion  Cervical back: Normal range of motion  Skin:     General: Skin is warm  Neurological:      General: No focal deficit present  Mental Status: She is alert and oriented to person, place, and time     Psychiatric:         Mood and Affect: Mood normal          Behavior: Behavior normal

## 2021-06-16 NOTE — H&P
Assessment/Plan: I reviewed patient's ultrasound report  She has got multiple gallstones  Based upon her symptomatology and her ultrasound report I think she would benefit from laparoscopic cholecystectomy  She also has a small umbilical hernia which will repair at the same time  I explained the procedure to her along with possible complications  We discuss complications like infection, bleeding, conversion to open surgery, bile leak, CBD injury  She verbalized understanding  She will be scheduled for surgery on  at [facility]  No problem-specific Assessment & Plan notes found for this encounter  Diagnoses and all orders for this visit:    Gallstones  -     Ambulatory referral to General Surgery    Class 2 severe obesity with serious comorbidity and body mass index (BMI) of 39 0 to 39 9 in adult, unspecified obesity type ([facility] [address] )  -     Ambulatory referral to General Surgery    Obesity (BMI 35 0-39 9 without comorbidity)  -     Ambulatory referral to General Surgery    Severe obesity (BMI 35 0-39  9) with comorbidity ([facility] [address] )  -     Ambulatory referral to General Surgery    BMI 39 0-39 9,adult  -     Ambulatory referral to General Surgery    RUQ abdominal pain  -     Ambulatory referral to General Surgery    Umbilical hernia          Subjective:      Patient ID: Artis Escalante is a 52 y o  female  53-year-old female patient came to my office with complaints of right upper quadrant pain  She says she had it few weeks ago  It was sudden in nature radiated to her back and shoulder  There was no nausea or vomiting  She had 1 more episode which was milder in nature few years ago  Patient had an ultrasound done and was found to have gallstones  No complaints  Of diarrhea constipation  Her prior surgery has been         The following portions of the patient's history were reviewed and updated as appropriate:   She  has a past medical history of COVID-19 (2020), Generalized anxiety disorder (2015), GERD (gastroesophageal reflux disease), History of gestational diabetes (, ), IFG (impaired fasting glucose), Obesity, Osteoarthritis (), Other hyperlipidemia (6/3/2021), Psoriasis (2015), Scalp psoriasis (2021), and Vitamin D deficiency (2019)  She   Patient Active Problem List    Diagnosis Date Noted    Scalp psoriasis 2021    GERD (gastroesophageal reflux disease)     Obesity     Other hyperlipidemia 2021    IFG (impaired fasting glucose)     Positive HANH (antinuclear antibody) 10/09/2019    Microscopic hematuria 10/09/2019    Vitamin D deficiency 2019    Generalized anxiety disorder 2015    Psoriasis 2015    Osteoarthritis      She  has a past surgical history that includes  section ( & ); ARTHROSCOPY ANKLE (Right, 2016); and Tonsillectomy  Her family history includes Coronary artery disease (age of onset: 32) in her father; Diabetes in her maternal grandmother, paternal grandfather, and paternal grandmother; Diabetes (age of onset: 36) in her father; Heart attack (age of onset: 32) in her father; Heart defect in her mother; Heart disease in her paternal grandfather and paternal grandmother; Kidney disease in her maternal grandmother; Muscular dystrophy (age of onset: 32) in her mother; No Known Problems in her brother, maternal grandfather, son, and son  She  reports that she quit smoking about 11 years ago  Her smoking use included cigarettes  She started smoking about 35 years ago  She has a 10 00 pack-year smoking history  She has never used smokeless tobacco  She reports current alcohol use of about 1 0 standard drinks of alcohol per week  She reports previous drug use  Drug: Marijuana  Current Outpatient Medications   Medication Sig Dispense Refill    clobetasol (OLUX) 0 05 % topical foam Apply 1 application topically 2 (two) times a day as needed For Scalp Psoriasis        triamcinolone (KENALOG) 0 1 % cream Apply 1 application topically 2 (two) times a day as needed        No current facility-administered medications for this visit  Current Outpatient Medications on File Prior to Visit   Medication Sig    clobetasol (OLUX) 0 05 % topical foam Apply 1 application topically 2 (two) times a day as needed For Scalp Psoriasis   triamcinolone (KENALOG) 0 1 % cream Apply 1 application topically 2 (two) times a day as needed      No current facility-administered medications on file prior to visit  She has No Known Allergies       Review of Systems   Constitutional: Negative  HENT: Negative  Eyes: Negative  Respiratory: Negative  Cardiovascular: Negative  Gastrointestinal: Positive for abdominal pain  Negative for abdominal distention, anal bleeding, blood in stool, constipation, diarrhea, nausea, rectal pain and vomiting  Endocrine: Negative  Genitourinary: Negative  Musculoskeletal: Negative  Skin: Negative  Allergic/Immunologic: Negative  Neurological: Negative  Hematological: Negative  Psychiatric/Behavioral: Negative  Objective:      /66   Pulse 87   Temp 99 6 °F (37 6 °C)   Ht 5' 4 45" (1 637 m)   Wt 110 kg (243 lb)   BMI 41 13 kg/m²          Physical Exam  Vitals reviewed  Constitutional:       Appearance: She is obese  HENT:      Head: Normocephalic and atraumatic  Nose: Nose normal    Eyes:      General: No scleral icterus  Pupils: Pupils are equal, round, and reactive to light  Cardiovascular:      Rate and Rhythm: Normal rate and regular rhythm  Pulses: Normal pulses  Heart sounds: Normal heart sounds  Pulmonary:      Effort: Pulmonary effort is normal       Breath sounds: Normal breath sounds  Abdominal:      General: Abdomen is flat  Bowel sounds are normal       Palpations: Abdomen is soft  Hernia: A hernia (Reducible umbilical hernia) is present     Musculoskeletal:         General: Normal range of motion  Cervical back: Normal range of motion  Skin:     General: Skin is warm  Neurological:      General: No focal deficit present  Mental Status: She is alert and oriented to person, place, and time     Psychiatric:         Mood and Affect: Mood normal          Behavior: Behavior normal

## 2021-06-17 ENCOUNTER — TELEPHONE (OUTPATIENT)
Dept: SURGERY | Facility: CLINIC | Age: 51
End: 2021-06-17

## 2021-06-17 NOTE — TELEPHONE ENCOUNTER
Post visit call from 6/16 with Dr Keith Ny  Patient was very satisfied with Dr Keith Ny and the staff  She said everyone was wonderful  She felt well taken care of

## 2021-06-24 RX ORDER — IBUPROFEN 200 MG
2 TABLET ORAL AS NEEDED
COMMUNITY

## 2021-06-24 NOTE — PRE-PROCEDURE INSTRUCTIONS
Pre-Surgery Instructions:   Medication Instructions    clobetasol (OLUX) 0 05 % topical foam do not use after skin prep    Ibuprofen (ADVIL PO) stop week prior day of surgery    triamcinolone (KENALOG) 0 1 % cream do not use after skin prep   Have you had / have a sore throat? No  Have you had / have a cough less than 1 week? No  Have you had / have a fever greater than 100 0 - 100  4? No  Are you experiencing any shortness of breath? No    Reviewed with patient via phone all medication instructions  Advised not to take any NSAID's, Vitamins not ordered by Surgeon or Herbal products for 7 days prior to the DOS  Acetaminophen products are ok to take  Instructed about NPO after midnight the night before DOS  Reviewed showering instructions as given by Surgical office  Instructed to call Surgical office with any questions  Informed about call from [facility] with the time to arrive for the scheduled surgery  Patient verbalized understanding  Advise Refer to Weight Management Program and is interested

## 2021-06-26 ENCOUNTER — OFFICE VISIT (OUTPATIENT)
Dept: LAB | Facility: CLINIC | Age: 51
End: 2021-06-26
Payer: COMMERCIAL

## 2021-06-26 DIAGNOSIS — Z01.818 ENCOUNTER FOR PREADMISSION TESTING: ICD-10-CM

## 2021-06-26 PROCEDURE — 93005 ELECTROCARDIOGRAM TRACING: CPT

## 2021-06-28 LAB
ATRIAL RATE: 68 BPM
P AXIS: 39 DEGREES
PR INTERVAL: 154 MS
QRS AXIS: 23 DEGREES
QRSD INTERVAL: 80 MS
QT INTERVAL: 394 MS
QTC INTERVAL: 418 MS
T WAVE AXIS: 27 DEGREES
VENTRICULAR RATE: 68 BPM

## 2021-06-28 PROCEDURE — 93010 ELECTROCARDIOGRAM REPORT: CPT | Performed by: INTERNAL MEDICINE

## 2021-07-07 ENCOUNTER — ANESTHESIA EVENT (OUTPATIENT)
Dept: PERIOP | Facility: HOSPITAL | Age: 51
End: 2021-07-07
Payer: COMMERCIAL

## 2021-07-08 ENCOUNTER — HOSPITAL ENCOUNTER (OUTPATIENT)
Facility: HOSPITAL | Age: 51
Setting detail: OUTPATIENT SURGERY
Discharge: HOME | End: 2021-07-08
Attending: SURGERY | Admitting: SURGERY
Payer: COMMERCIAL

## 2021-07-08 ENCOUNTER — ANESTHESIA (OUTPATIENT)
Dept: PERIOP | Facility: HOSPITAL | Age: 51
End: 2021-07-08
Payer: COMMERCIAL

## 2021-07-08 VITALS
WEIGHT: 238 LBS | OXYGEN SATURATION: 97 % | RESPIRATION RATE: 18 BRPM | TEMPERATURE: 98 F | BODY MASS INDEX: 40.63 KG/M2 | SYSTOLIC BLOOD PRESSURE: 143 MMHG | DIASTOLIC BLOOD PRESSURE: 68 MMHG | HEIGHT: 64 IN | HEART RATE: 72 BPM

## 2021-07-08 DIAGNOSIS — Z90.49 S/P LAPAROSCOPIC CHOLECYSTECTOMY: Primary | ICD-10-CM

## 2021-07-08 DIAGNOSIS — K42.9 UMBILICAL HERNIA: ICD-10-CM

## 2021-07-08 DIAGNOSIS — K80.20 GALLSTONES: ICD-10-CM

## 2021-07-08 PROCEDURE — 88304 TISSUE EXAM BY PATHOLOGIST: CPT | Performed by: PATHOLOGY

## 2021-07-08 PROCEDURE — 49585 PR REPAIR UMBILICAL HERN,5+Y/O,REDUC: CPT | Performed by: SURGERY

## 2021-07-08 PROCEDURE — 49585 PR REPAIR UMBILICAL HERN,5+Y/O,REDUC: CPT | Performed by: PHYSICIAN ASSISTANT

## 2021-07-08 PROCEDURE — 47562 LAPAROSCOPIC CHOLECYSTECTOMY: CPT | Performed by: PHYSICIAN ASSISTANT

## 2021-07-08 PROCEDURE — NC001 PR NO CHARGE: Performed by: PHYSICIAN ASSISTANT

## 2021-07-08 PROCEDURE — 47562 LAPAROSCOPIC CHOLECYSTECTOMY: CPT | Performed by: SURGERY

## 2021-07-08 RX ORDER — LIDOCAINE HYDROCHLORIDE 10 MG/ML
INJECTION, SOLUTION EPIDURAL; INFILTRATION; INTRACAUDAL; PERINEURAL AS NEEDED
Status: DISCONTINUED | OUTPATIENT
Start: 2021-07-08 | End: 2021-07-08

## 2021-07-08 RX ORDER — ROCURONIUM BROMIDE 10 MG/ML
INJECTION, SOLUTION INTRAVENOUS AS NEEDED
Status: DISCONTINUED | OUTPATIENT
Start: 2021-07-08 | End: 2021-07-08

## 2021-07-08 RX ORDER — ONDANSETRON 2 MG/ML
INJECTION INTRAMUSCULAR; INTRAVENOUS AS NEEDED
Status: DISCONTINUED | OUTPATIENT
Start: 2021-07-08 | End: 2021-07-08

## 2021-07-08 RX ORDER — BUPIVACAINE HYDROCHLORIDE AND EPINEPHRINE 2.5; 5 MG/ML; UG/ML
INJECTION, SOLUTION INFILTRATION; PERINEURAL AS NEEDED
Status: DISCONTINUED | OUTPATIENT
Start: 2021-07-08 | End: 2021-07-08 | Stop reason: HOSPADM

## 2021-07-08 RX ORDER — FENTANYL CITRATE/PF 50 MCG/ML
50 SYRINGE (ML) INJECTION
Status: DISCONTINUED | OUTPATIENT
Start: 2021-07-08 | End: 2021-07-08 | Stop reason: HOSPADM

## 2021-07-08 RX ORDER — ACETAMINOPHEN 325 MG/1
650 TABLET ORAL EVERY 6 HOURS PRN
Status: DISCONTINUED | OUTPATIENT
Start: 2021-07-08 | End: 2021-07-08 | Stop reason: HOSPADM

## 2021-07-08 RX ORDER — DEXAMETHASONE SODIUM PHOSPHATE 10 MG/ML
INJECTION, SOLUTION INTRAMUSCULAR; INTRAVENOUS AS NEEDED
Status: DISCONTINUED | OUTPATIENT
Start: 2021-07-08 | End: 2021-07-08

## 2021-07-08 RX ORDER — OXYCODONE HYDROCHLORIDE 5 MG/1
5 TABLET ORAL EVERY 4 HOURS PRN
Qty: 10 TABLET | Refills: 0 | Status: SHIPPED | OUTPATIENT
Start: 2021-07-08 | End: 2021-07-16

## 2021-07-08 RX ORDER — MORPHINE SULFATE 4 MG/ML
2 INJECTION, SOLUTION INTRAMUSCULAR; INTRAVENOUS
Status: DISCONTINUED | OUTPATIENT
Start: 2021-07-08 | End: 2021-07-08 | Stop reason: HOSPADM

## 2021-07-08 RX ORDER — CEFAZOLIN SODIUM 1 G/3ML
INJECTION, POWDER, FOR SOLUTION INTRAMUSCULAR; INTRAVENOUS AS NEEDED
Status: DISCONTINUED | OUTPATIENT
Start: 2021-07-08 | End: 2021-07-08

## 2021-07-08 RX ORDER — DIPHENHYDRAMINE HYDROCHLORIDE 50 MG/ML
12.5 INJECTION INTRAMUSCULAR; INTRAVENOUS ONCE AS NEEDED
Status: DISCONTINUED | OUTPATIENT
Start: 2021-07-08 | End: 2021-07-08 | Stop reason: HOSPADM

## 2021-07-08 RX ORDER — FENTANYL CITRATE 50 UG/ML
INJECTION, SOLUTION INTRAMUSCULAR; INTRAVENOUS AS NEEDED
Status: DISCONTINUED | OUTPATIENT
Start: 2021-07-08 | End: 2021-07-08

## 2021-07-08 RX ORDER — MIDAZOLAM HYDROCHLORIDE 2 MG/2ML
INJECTION, SOLUTION INTRAMUSCULAR; INTRAVENOUS AS NEEDED
Status: DISCONTINUED | OUTPATIENT
Start: 2021-07-08 | End: 2021-07-08

## 2021-07-08 RX ORDER — CEFAZOLIN SODIUM 2 G/50ML
2000 SOLUTION INTRAVENOUS ONCE
Status: CANCELLED | OUTPATIENT
Start: 2021-07-08

## 2021-07-08 RX ORDER — SODIUM CHLORIDE, SODIUM LACTATE, POTASSIUM CHLORIDE, CALCIUM CHLORIDE 600; 310; 30; 20 MG/100ML; MG/100ML; MG/100ML; MG/100ML
125 INJECTION, SOLUTION INTRAVENOUS CONTINUOUS
Status: DISCONTINUED | OUTPATIENT
Start: 2021-07-08 | End: 2021-07-08 | Stop reason: HOSPADM

## 2021-07-08 RX ORDER — PROPOFOL 10 MG/ML
INJECTION, EMULSION INTRAVENOUS AS NEEDED
Status: DISCONTINUED | OUTPATIENT
Start: 2021-07-08 | End: 2021-07-08

## 2021-07-08 RX ORDER — ONDANSETRON 2 MG/ML
4 INJECTION INTRAMUSCULAR; INTRAVENOUS ONCE AS NEEDED
Status: DISCONTINUED | OUTPATIENT
Start: 2021-07-08 | End: 2021-07-08 | Stop reason: HOSPADM

## 2021-07-08 RX ORDER — SODIUM CHLORIDE, SODIUM LACTATE, POTASSIUM CHLORIDE, CALCIUM CHLORIDE 600; 310; 30; 20 MG/100ML; MG/100ML; MG/100ML; MG/100ML
INJECTION, SOLUTION INTRAVENOUS CONTINUOUS PRN
Status: DISCONTINUED | OUTPATIENT
Start: 2021-07-08 | End: 2021-07-08

## 2021-07-08 RX ORDER — CEFAZOLIN SODIUM 2 G/50ML
2000 SOLUTION INTRAVENOUS ONCE
Status: DISCONTINUED | OUTPATIENT
Start: 2021-07-08 | End: 2021-07-08 | Stop reason: HOSPADM

## 2021-07-08 RX ORDER — MAGNESIUM HYDROXIDE 1200 MG/15ML
LIQUID ORAL AS NEEDED
Status: DISCONTINUED | OUTPATIENT
Start: 2021-07-08 | End: 2021-07-08 | Stop reason: HOSPADM

## 2021-07-08 RX ADMIN — ROCURONIUM BROMIDE 10 MG: 10 INJECTION, SOLUTION INTRAVENOUS at 10:08

## 2021-07-08 RX ADMIN — ACETAMINOPHEN 650 MG: 325 TABLET, FILM COATED ORAL at 12:42

## 2021-07-08 RX ADMIN — LIDOCAINE HYDROCHLORIDE 100 MG: 10 INJECTION, SOLUTION EPIDURAL; INFILTRATION; INTRACAUDAL; PERINEURAL at 09:36

## 2021-07-08 RX ADMIN — SODIUM CHLORIDE, SODIUM LACTATE, POTASSIUM CHLORIDE, AND CALCIUM CHLORIDE: .6; .31; .03; .02 INJECTION, SOLUTION INTRAVENOUS at 09:43

## 2021-07-08 RX ADMIN — PROPOFOL 200 MG: 10 INJECTION, EMULSION INTRAVENOUS at 09:36

## 2021-07-08 RX ADMIN — CEFAZOLIN 2000 MG: 1 INJECTION, POWDER, FOR SOLUTION INTRAVENOUS at 09:33

## 2021-07-08 RX ADMIN — FENTANYL CITRATE 100 MCG: 50 INJECTION, SOLUTION INTRAMUSCULAR; INTRAVENOUS at 09:35

## 2021-07-08 RX ADMIN — FENTANYL CITRATE 50 MCG: 50 INJECTION, SOLUTION INTRAMUSCULAR; INTRAVENOUS at 11:14

## 2021-07-08 RX ADMIN — SODIUM CHLORIDE, SODIUM LACTATE, POTASSIUM CHLORIDE, AND CALCIUM CHLORIDE: .6; .31; .03; .02 INJECTION, SOLUTION INTRAVENOUS at 09:33

## 2021-07-08 RX ADMIN — FENTANYL CITRATE 50 MCG: 50 INJECTION, SOLUTION INTRAMUSCULAR; INTRAVENOUS at 11:30

## 2021-07-08 RX ADMIN — SODIUM CHLORIDE, SODIUM LACTATE, POTASSIUM CHLORIDE, AND CALCIUM CHLORIDE: .6; .31; .03; .02 INJECTION, SOLUTION INTRAVENOUS at 10:55

## 2021-07-08 RX ADMIN — DEXAMETHASONE SODIUM PHOSPHATE 10 MG: 10 INJECTION, SOLUTION INTRAMUSCULAR; INTRAVENOUS at 10:00

## 2021-07-08 RX ADMIN — ONDANSETRON 4 MG: 2 INJECTION INTRAMUSCULAR; INTRAVENOUS at 10:33

## 2021-07-08 RX ADMIN — SUGAMMADEX 400 MG: 100 INJECTION, SOLUTION INTRAVENOUS at 10:52

## 2021-07-08 RX ADMIN — ROCURONIUM BROMIDE 10 MG: 10 INJECTION, SOLUTION INTRAVENOUS at 10:24

## 2021-07-08 RX ADMIN — ROCURONIUM BROMIDE 40 MG: 10 INJECTION, SOLUTION INTRAVENOUS at 09:36

## 2021-07-08 RX ADMIN — PROPOFOL 100 MG: 10 INJECTION, EMULSION INTRAVENOUS at 10:31

## 2021-07-08 RX ADMIN — MIDAZOLAM HYDROCHLORIDE 2 MG: 1 INJECTION, SOLUTION INTRAMUSCULAR; INTRAVENOUS at 09:33

## 2021-07-08 NOTE — OP NOTE
OPERATIVE REPORT  PATIENT NAME: Caleb Tejada    :  1970  MRN: [id number]  Pt Location: [facility] OR ROOM 01    SURGERY DATE: 2021    Surgeon(s) and Role: * Marciano Guadarrama MD - Primary     * Edwin Melendez PA-C - Assisting    Preop Diagnosis:  Gallstones [T64 37]  Umbilical hernia [N51 6]    Post-Op Diagnosis Codes:     * Gallstones [A38 69]     * Umbilical hernia [M98 9]    Procedure(s) (LRB):  LAPAROSCOPIC POSSIBLE OPEN CHOLECYSTECTOMY (N/A)    Specimen(s):  ID Type Source Tests Collected by Time Destination   1 : gallbladder Tissue Gallbladder TISSUE EXAM Marciano Guadarrama MD 2021 [id number]        Estimated Blood Loss:   Minimal    Drains:  * No LDAs found *    Anesthesia Type:   General    Operative Indications:  Gallstones [W29 83]  Umbilical hernia [X31 3]      Operative Findings:  Gallstones  Normal anatomy at the Calot's triangle  Complications:   None    Procedure and Technique:  The patient was brought to the operating room and was identified correctly by myself and the operating room staff  General anesthesia was given by anesthesia team   Patient previously received IV antibiotics  Parts were prepped and draped in the standard fashion  A time-out was performed  Supraumbilical 2 cm incision was made transversely in deepened to the fascia  The fascia was grabbed with  clamp and was opened using electrocautery  Then treat to the peritoneum was made using Bety clamp  Melany port was inserted  Pneumoperitoneum created to 15 mm mercury pressure  Findings confirm  Three 5 mm ports were inserted in the right upper quadrant  The patient was positioned in reverse Trendelenburg and left side down position  The gallbladder fundus was grasped and elevated by the assistant  The Calot's triangle was dissected using [place] and hook electrocautery  Critical view of safety was obtained where only the cystic duct and artery were seen entering the gallbladder    Both structures were clipped doubly and cut  The gallbladder was then taken off the gallbladder bed using hook electrocautery  Hemostasis was achieved using hook electrocautery and Kateryna  Irrigation was performed  Excessive irrigated fluid was removed with suction  Gallbladder was then delivered in the Endo-Catch bag and removed  All the ports were removed under full vision  No bleeding was observed  The fascia at the umbilicus was closed interrupted fashion using 0 Vicryl  The skin was closed with 4-0 Monocryl at all port site  Patient tolerated the procedure well     I was present for the entire procedure, A qualified resident physician was not available and A physician assistant was required during the procedure for retraction tissue handling,dissection and suturing    Patient Disposition:  PACU     SIGNATURE: Amanda Weaver MD  DATE: July 8, 2021  TIME: 10:47 AM

## 2021-07-08 NOTE — DISCHARGE INSTRUCTIONS
Postoperative Care Instructions      1  General: You may feel pulling sensations around the wound or funny aches and pains around the incisions  This is normal  Even minor surgery is a change in your body and this is your body's reaction to it  If you have had abdominal surgery, it may help to support the incision with a small pillow or blanket for comfort when moving or coughing  2  Wound care: The glue over the incisions will fall off over the next week or two  If you have staples or stitches, they will be removed by the physician at your follow up appointment  3  Showering: You may shower on 7/9  Please do not soak wound in standing water such as a bath, hot tub, pool, lake, etc  Do not scrub or use exfoliants on the surgical wounds  4  Activity: You may go up and down stairs, walk as much as you are comfortable, but walk at least 3 times each day  If you have had abdominal surgery, do not perform any strenuous exercise or lift anything heavier than 15 pounds for at least 4 weeks, unless cleared by your physician  5  Diet: You may resume your regular diet  Please drink lots of water  6  Medications: Resume all of your previous medications, unless told otherwise by the doctor  A good option for pain control is to start with acetaminophen(Tylenol) 650mg and ibuprofen(Advil) 600mg and alternate taking them every 3 hours  If this is not sufficient then you make take the narcotic pain medicine as prescribed  You do not need to take the narcotic pain medication unless you are having significant pain and discomfort  Please take the narcotic medication with food  Insure that you do not take more than 4000 mg of Tylenol per day  7  Driving: You will need someone to drive you home on the day of surgery  Do not drive or make any important decisions while on narcotic pain medication  Generally, you may drive 48 hours after you've stopped taking all narcotic pain medications      8  Upset Stomach: You may take Maalox, Tums, or similar items for an upset stomach  If your narcotic pain medication causes an upset stomach, do not take it on an empty stomach  Try taking it with at least some crackers or toast      9  Constipation: Patients often experience constipation after surgery  We recommend starting an over-the-counter medication for this, such as Metamucil, Senokot, Colace, milk of magnesia, etc  You may stop taking these medications a couple days after your last dose of narcotic medication  If you experience significant nausea or vomiting after abdominal surgery, call the office before trying any of these medications  10  Call the office: If you are experiencing any of the following: fevers above 101 5°, significant nausea or vomiting, if the wound develops drainage and/or excessive redness around the wound, or if you have significant diarrhea or other worsening symptoms      11  Pain: A prescription for narcotic pain medication will be sent to your pharmacy upon discharge from the hospital

## 2021-07-08 NOTE — INTERVAL H&P NOTE
H&P reviewed  After examining the patient I find no changes in the patients condition since the H&P had been written      Vitals:    07/08/21 0853   BP: 129/67   Pulse: 80   Resp: 18   Temp: 98 3 °F (36 8 °C)   SpO2: 97%

## 2021-07-08 NOTE — ANESTHESIA PREPROCEDURE EVALUATION
Procedure:  LAPAROSCOPIC POSSIBLE OPEN CHOLECYSTECTOMY (N/A Abdomen)  UMBILICAL HERNIA REPAIR (N/A Abdomen)    Relevant Problems   CARDIO   (+) Other hyperlipidemia      GI/HEPATIC   (+) GERD (gastroesophageal reflux disease)      MUSCULOSKELETAL   (+) Osteoarthritis      NEURO/PSYCH   (+) Generalized anxiety disorder        Physical Exam    Airway    Mallampati score: II  TM Distance: >3 FB  Neck ROM: full     Dental   No notable dental hx     Cardiovascular  Rhythm: regular, Rate: normal, Cardiovascular exam normal    Pulmonary  Pulmonary exam normal Breath sounds clear to auscultation,     Other Findings        Anesthesia Plan  ASA Score- 2     Anesthesia Type- general with ASA Monitors  Additional Monitors:   Airway Plan: ETT  Plan Factors-Exercise tolerance (METS): >4 METS  Chart reviewed  EKG reviewed  Existing labs reviewed  Patient is not a current smoker  Patient instructed to abstain from smoking on day of procedure  Patient did not smoke on day of surgery  There is medical exclusion for perioperative obstructive sleep apnea risk education  Induction- intravenous  Postoperative Plan- Plan for postoperative opioid use  Planned trial extubation    Informed Consent- Anesthetic plan and risks discussed with patient  I personally reviewed this patient with the CRNA  Discussed and agreed on the Anesthesia Plan with the CRNA  Diane Hoskins

## 2021-07-08 NOTE — ANESTHESIA POSTPROCEDURE EVALUATION
Post-Op Assessment Note    CV Status:  Stable  Pain Score: 0    Pain management: adequate     Mental Status:  Alert and awake   Hydration Status:  Stable   PONV Controlled:  None   Airway Patency:  Patent      Post Op Vitals Reviewed: Yes      Staff: Anesthesiologist, CRNA         Encounter Complications   Complication Outcome Phase Comment   Difficult to intubate - unexpected Resolved Intraoperatively Intraprocedure Attempted intubation with Jackson 2, glidescope x 2, unsuccessful   Successful intubation with Fonseca 4        BP  140/64    Temp 97 6   Pulse 98   Resp 18   SpO2 99

## 2021-07-15 NOTE — PROGRESS NOTES
Assessment/Plan:  Problem List Items Addressed This Visit        Digestive    GERD (gastroesophageal reflux disease)     Stable s meds  Watch GERD diet  Endocrine    IFG (impaired fasting glucose) - Primary     Stable  Recommend lifestyle modifications  Relevant Orders    Comprehensive metabolic panel    Hemoglobin A1C       Musculoskeletal and Integument    Psoriasis     Uncontrolled on face  Other areas stable on Triamcinolone cream PRN  Pending Derm consult  Relevant Medications    clobetasol (OLUX) 0 05 % topical foam    Other Relevant Orders    Ambulatory referral to Dermatology    Vitamin D 25 hydroxy    Scalp psoriasis     Scalp Psoriasis - Stable on Clobetasol foam PRN  Pending Derm consult  Relevant Medications    clobetasol (OLUX) 0 05 % topical foam    Other Relevant Orders    Ambulatory referral to Dermatology    Vitamin D 25 hydroxy    Osteoarthritis     Stable s meds  Genitourinary    Microscopic hematuria     Resolved  Stable U/A 6/4/21  Other    Vitamin D deficiency     Recurrent  Vitamin D Deficiency - Recommend start multivitamin and prescription vitamin D (Drisdol)  When 12 weeks of Drisdol completed, continue multivitamin and start over-the-counter Vitamin D3 1,000-3,000 International Units daily  Check vitamin D level 1 week afterwards completing Drisdol  Relevant Medications    ergocalciferol (ERGOCALCIFEROL) 1 25 MG (47876 UT) capsule    Other Relevant Orders    Vitamin D 25 hydroxy    Comprehensive metabolic panel    Vitamin D 25 hydroxy    Generalized anxiety disorder     Improved  She is stable without mood medications or counseling  Relevant Orders    CBC and differential    Comprehensive metabolic panel    TSH, 3rd generation with Free T4 reflex    Other hyperlipidemia     Stable s statin  Recommend lifestyle modifications      The 10-year ASCVD risk score (Aayush Bae et al , 2013) is: 1%    Values used to calculate the score:      Age: 52 years      Sex: Female      Is Non- : No      Diabetic: No      Tobacco smoker: No      Systolic Blood Pressure: 502 mmHg      Is BP treated: No      HDL Cholesterol: 58 mg/dL      Total Cholesterol: 241 mg/dL           Relevant Orders    CBC and differential    Comprehensive metabolic panel    Lipid panel    TSH, 3rd generation with Free T4 reflex    LDL cholesterol, direct    Morbid obesity (HCC)     Improved  Recommend lifestyle modifications  Pending weight management consult  Relevant Orders    TSH, 3rd generation with Free T4 reflex    Positive HANH (antinuclear antibody)     Repeat HANH is negative  Return in about 6 months (around 1/16/2022) for 6mo - IFG, HL, Adjust D/O, Anxiety, Vit D Def, M Obesity, Labs  Future Appointments   Date Time Provider Mirna Jerome   7/21/2021  3:00 PM Emilia Davis MD [facility]   8/10/2021  8:00 AM Lyle Soliman PA-C [facility]   1/17/2022  8:00 AM Parvin Eubanks,  [facility]        Subjective:     Drea Valencia is a 52 y o  female who presents today for a follow-up on her chronic medical conditions  HPI:  Chief Complaint   Patient presents with    Follow-up     -- Above per clinical staff and reviewed  --      HPI      Today:    Return in about 6 weeks (around 7/16/2021) for Recheck IFG, HL, Adjust D/O, Anxiety, Vit D Def, Micro Hematuria, HANH+, Obesity, Labs              Gallstones / Fatty Liver - s/p   Laparoscopic cholecystectomy on 07/08/2021 per Dr Adrián Marie   Next appt 7/21/21  Morbid Obesity - Pending Weight Management consult 8/21  Trying to watch diet  No regular exercise        Hyperlipidemia - No statin previously        GERD -  Stable, except for late night eating  No longer uses OTC acid reducer PRN        IFG / H/O GDM - Diet-controlled    No meds previously        OA - Knees and Ankles - Stable s OTC meds        Anxiety -  Patient states mood was stable until her mother passed away 21  Good social supports  No SI/HI/AH/VH  No mood meds or counseling previously  PHQ-9 Depression Screening    PHQ-9:   Frequency of the following problems over the past two weeks:      Little interest or pleasure in doing things: 0 - not at all  Feeling down, depressed, or hopeless: 0 - not at all  Trouble falling or staying asleep, or sleeping too much: 0 - not at all  Feeling tired or having little energy: 0 - not at all  Poor appetite or overeatin - not at all  Feeling bad about yourself - or that you are a failure or have let yourself or your family down: 0 - not at all  Trouble concentrating on things, such as reading the newspaper or watching television: 0 - not at all  Moving or speaking so slowly that other people could have noticed  Or the opposite - being so fidgety or restless that you have been moving around a lot more than usual: 0 - not at all  Thoughts that you would be better off dead, or of hurting yourself in some way: 0 - not at all  PHQ-2 Score: 0  PHQ-9 Score: 0         GRACIELA-7 Flowsheet Screening      Most Recent Value   Over the last 2 weeks, how often have you been bothered by any of the following problems? Feeling nervous, anxious, or on edge  0   Not being able to stop or control worrying  0   Worrying too much about different things  0   Trouble relaxing  0   Being so restless that it is hard to sit still  0   Becoming easily annoyed or irritable  0   Feeling afraid as if something awful might happen  0   GRACIELA-7 Total Score  0             Vitamin D Deficiency - s/p Drisdol  No MVI and OTC Vitamin C currently         +HANH - No Rheum consult previously  Negative repeat HANH 21        Psoriasis - Management per PCP  Has not seen Derm in years  Behind ears and B/L nares  Stable on Triamcinolone cream PRN        Scalp Psoriasis - Management per PCP    Has not seen Derm in years   Stable on Clobetasol foam PRN        Microscopic Hematuria - No Uro evaluation previously  Stable U/A 21              From previous note:    Controlled Substance Review     [place]MP or [place]  reviewed: No red flags were identified; safe to proceed with prescription          Gallstones / Fatty Liver - Seen on U/S 21 at [facility] - ordered by [facility]   Also has fatty liver  [facility] advised that patient see a General Surgeon  Had RUQ mid   Now feels "raw" in RUQ, denies pain  Previously she had RUQ pain c laying supine, walking, pain radiated to spine  Trying to avoid fried and fatty foods        Obesity - Trying to watch diet  No regular exercise        Hyperlipidemia - No statin previously        GERD -  Stable, except for late night eating  Uses OTC acid reducer PRN        IFG / H/O GDM - Diet-controlled  No meds previously        OA - Knees and Ankles - Stable s OTC meds        Anxiety -  Patient states mood was stable until her mother passed away 21  Good social supports  No SI/HI/AH/VH  No mood meds or counseling previously        PHQ-9 Depression Screening    PHQ-9:   Frequency of the following problems over the past two weeks:      Little interest or pleasure in doing things: 0 - not at all  Feeling down, depressed, or hopeless: 1 - several days  Trouble falling or staying asleep, or sleeping too much: 1 - several days  Feeling tired or having little energy: 0 - not at all  Poor appetite or overeatin - not at all  Feeling bad about yourself - or that you are a failure or have let yourself or your family down: 0 - not at all  Trouble concentrating on things, such as reading the newspaper or watching television: 0 - not at all  Moving or speaking so slowly that other people could have noticed   Or the opposite - being so fidgety or restless that you have been moving around a lot more than usual: 0 - not at all  Thoughts that you would be better off dead, or of hurting yourself in some way: 0 - not at all  PHQ-2 Score: 1  PHQ-9 Score: 2                GRACIELA-7 Flowsheet Screening      Most Recent Value   Over the last two weeks, how often have you been bothered by the following problems? Feeling nervous, anxious, or on edge  0   Not being able to stop or control worrying  0   Worrying too much about different things  0   Trouble relaxing   0   Being so restless that it's hard to sit still  0   Becoming easily annoyed or irritable   0   Feeling afraid as if something awful might happen  0   How difficult have these problems made it for you to do your work, take care of things at home, or get along with other people? Not difficult at all   GRACIELA Score   0          MDQ:  0     Vitamin D Deficiency - s/p Drisdol  No MVI and OTC Vitamin C currently         +HANH - No Rheum consult previously        Psoriasis - Management per PCP  Has not seen Derm in years  Behind ears and B/L nares  Stable on Triamcinolone cream PRN        Scalp Psoriasis - Management per PCP  Has not seen Derm in years  Stable on Clobetasol foam PRN         Microscopic Hematuria - No Uro evaluation previously           Reviewed:  Labs 6/4/21     No Gyn  Last saw Gyn years ago  No abnormal Paps in the past        Sees Dentist q6 months  Sees Optho q2 years                  The following portions of the patient's history were reviewed and updated as appropriate: allergies, current medications, past family history, past medical history, past social history, past surgical history and problem list       Review of Systems   Constitutional: Negative for appetite change, chills, diaphoresis, fatigue and fever  Respiratory: Negative for chest tightness and shortness of breath  Cardiovascular: Negative for chest pain  Gastrointestinal: Negative for abdominal pain, blood in stool, diarrhea, nausea and vomiting  Genitourinary: Negative for dysuria          Current Outpatient Medications   Medication Sig Dispense Refill    clobetasol (OLUX) 0 05 % topical foam Apply 1 application topically 2 (two) times a day as needed (Scalp Psoriasis) For Scalp Psoriasis  100 g 0    ibuprofen (Advil) 200 mg tablet Take 2 tablets by mouth as needed       triamcinolone (KENALOG) 0 1 % cream Apply 1 application topically 2 (two) times a day as needed       ergocalciferol (ERGOCALCIFEROL) 1 25 MG (31092 UT) capsule Take 1 capsule (50,000 Units total) by mouth once a week for 12 doses 12 capsule 0     No current facility-administered medications for this visit  Objective:  /60   Pulse 77   Temp 98 1 °F (36 7 °C)   Resp 14   Ht 5' 4" (1 626 m)   Wt 108 kg (237 lb)   SpO2 98%   BMI 40 68 kg/m²    Wt Readings from Last 3 Encounters:   07/16/21 108 kg (237 lb)   07/08/21 108 kg (238 lb)   06/16/21 110 kg (243 lb)      BP Readings from Last 3 Encounters:   07/16/21 112/60   07/08/21 143/68   06/16/21 124/66          Physical Exam  Vitals and nursing note reviewed  Constitutional:       Appearance: Normal appearance  She is well-developed  She is obese  HENT:      Head: Normocephalic and atraumatic  Eyes:      Conjunctiva/sclera: Conjunctivae normal    Neck:      Thyroid: No thyromegaly  Cardiovascular:      Rate and Rhythm: Normal rate and regular rhythm  Pulses: Normal pulses  Heart sounds: Normal heart sounds  Pulmonary:      Effort: Pulmonary effort is normal       Breath sounds: Normal breath sounds  Abdominal:      General: Bowel sounds are normal  There is no distension  Palpations: Abdomen is soft  There is no mass  Tenderness: There is no abdominal tenderness  There is no guarding or rebound  Comments: Surgical trochar incisions intact   Musculoskeletal:         General: No swelling  Cervical back: Neck supple  Right lower leg: No edema  Left lower leg: No edema  Skin:     General: Skin is warm and dry     Neurological:      General: No focal deficit present  Mental Status: She is alert and oriented to person, place, and time  Psychiatric:         Mood and Affect: Mood normal          Behavior: Behavior normal          Thought Content: Thought content normal          Judgment: Judgment normal          Lab Results:      Lab Results   Component Value Date    WBC 6 50 06/04/2021    HGB 11 5 06/04/2021    HCT 36 7 06/04/2021     06/04/2021    TRIG 105 06/04/2021    HDL 58 06/04/2021    LDLDIRECT 165 (H) 06/04/2021    ALT 41 06/04/2021    AST 23 06/04/2021    K 4 0 06/04/2021     06/04/2021    CREATININE 0 74 06/04/2021    BUN 7 06/04/2021    CO2 24 06/04/2021    GLUF 92 06/04/2021    HGBA1C 5 6 06/04/2021     Lab Results   Component Value Date    URICACID 5 9 06/04/2021     Invalid input(s): BASENAME Vitamin D    No results found       POCT Labs

## 2021-07-16 ENCOUNTER — OFFICE VISIT (OUTPATIENT)
Dept: FAMILY MEDICINE CLINIC | Facility: CLINIC | Age: 51
End: 2021-07-16
Payer: COMMERCIAL

## 2021-07-16 VITALS
HEIGHT: 64 IN | WEIGHT: 237 LBS | HEART RATE: 77 BPM | SYSTOLIC BLOOD PRESSURE: 112 MMHG | RESPIRATION RATE: 14 BRPM | TEMPERATURE: 98.1 F | OXYGEN SATURATION: 98 % | BODY MASS INDEX: 40.46 KG/M2 | DIASTOLIC BLOOD PRESSURE: 60 MMHG

## 2021-07-16 DIAGNOSIS — R31.29 MICROSCOPIC HEMATURIA: ICD-10-CM

## 2021-07-16 DIAGNOSIS — E55.9 VITAMIN D DEFICIENCY: ICD-10-CM

## 2021-07-16 DIAGNOSIS — E78.49 OTHER HYPERLIPIDEMIA: ICD-10-CM

## 2021-07-16 DIAGNOSIS — M15.9 OSTEOARTHRITIS OF MULTIPLE JOINTS, UNSPECIFIED OSTEOARTHRITIS TYPE: ICD-10-CM

## 2021-07-16 DIAGNOSIS — K21.9 GASTROESOPHAGEAL REFLUX DISEASE, UNSPECIFIED WHETHER ESOPHAGITIS PRESENT: ICD-10-CM

## 2021-07-16 DIAGNOSIS — R73.01 IFG (IMPAIRED FASTING GLUCOSE): Primary | ICD-10-CM

## 2021-07-16 DIAGNOSIS — E66.01 MORBID OBESITY (HCC): ICD-10-CM

## 2021-07-16 DIAGNOSIS — R76.8 POSITIVE ANA (ANTINUCLEAR ANTIBODY): ICD-10-CM

## 2021-07-16 DIAGNOSIS — F41.1 GENERALIZED ANXIETY DISORDER: ICD-10-CM

## 2021-07-16 DIAGNOSIS — L40.9 PSORIASIS: ICD-10-CM

## 2021-07-16 DIAGNOSIS — L40.9 SCALP PSORIASIS: ICD-10-CM

## 2021-07-16 PROCEDURE — 3008F BODY MASS INDEX DOCD: CPT | Performed by: SURGERY

## 2021-07-16 PROCEDURE — 1036F TOBACCO NON-USER: CPT | Performed by: FAMILY MEDICINE

## 2021-07-16 PROCEDURE — 99214 OFFICE O/P EST MOD 30 MIN: CPT | Performed by: FAMILY MEDICINE

## 2021-07-16 RX ORDER — ERGOCALCIFEROL (VITAMIN D2) 1250 MCG
50000 CAPSULE ORAL WEEKLY
Qty: 12 CAPSULE | Refills: 0 | Status: SHIPPED | OUTPATIENT
Start: 2021-07-16 | End: 2021-10-02

## 2021-07-16 RX ORDER — CLOBETASOL PROPIONATE 0.5 MG/G
1 AEROSOL, FOAM TOPICAL 2 TIMES DAILY PRN
Qty: 100 G | Refills: 0 | Status: SHIPPED | OUTPATIENT
Start: 2021-07-16 | End: 2022-02-02 | Stop reason: SDUPTHER

## 2021-07-16 NOTE — PATIENT INSTRUCTIONS
Vitamin D Deficiency - Recommend start multivitamin and prescription vitamin D (Drisdol)  When 12 weeks of Drisdol completed, continue multivitamin and start over-the-counter Vitamin D3 1,000-3,000 International Units daily  Check vitamin D level 1 week afterwards completing Drisdol

## 2021-07-16 NOTE — ASSESSMENT & PLAN NOTE
Recurrent  Vitamin D Deficiency - Recommend start multivitamin and prescription vitamin D (Drisdol)  When 12 weeks of Drisdol completed, continue multivitamin and start over-the-counter Vitamin D3 1,000-3,000 International Units daily  Check vitamin D level 1 week afterwards completing Drisdol

## 2021-07-16 NOTE — ASSESSMENT & PLAN NOTE
Stable s statin  Recommend lifestyle modifications      The 10-year ASCVD risk score (Zaid Maria et al , 2013) is: 1%    Values used to calculate the score:      Age: 52 years      Sex: Female      Is Non- : No      Diabetic: No      Tobacco smoker: No      Systolic Blood Pressure: 916 mmHg      Is BP treated: No      HDL Cholesterol: 58 mg/dL      Total Cholesterol: 241 mg/dL

## 2021-07-21 ENCOUNTER — OFFICE VISIT (OUTPATIENT)
Dept: SURGERY | Facility: CLINIC | Age: 51
End: 2021-07-21

## 2021-07-21 VITALS
WEIGHT: 240 LBS | DIASTOLIC BLOOD PRESSURE: 72 MMHG | SYSTOLIC BLOOD PRESSURE: 120 MMHG | HEART RATE: 72 BPM | TEMPERATURE: 98.8 F | RESPIRATION RATE: 18 BRPM | BODY MASS INDEX: 40.97 KG/M2 | HEIGHT: 64 IN

## 2021-07-21 DIAGNOSIS — K80.10 CHOLELITHIASIS WITH CHRONIC CHOLECYSTITIS: Primary | ICD-10-CM

## 2021-07-21 PROCEDURE — 3008F BODY MASS INDEX DOCD: CPT | Performed by: FAMILY MEDICINE

## 2021-07-21 PROCEDURE — 99024 POSTOP FOLLOW-UP VISIT: CPT | Performed by: SURGERY

## 2021-07-21 NOTE — LETTER
July 21, 2021     Patient: Alberto Nunez   YOB: 1970   Date of Visit: 7/21/2021       To Whom it May Concern:    Alberto Nunez is under my professional care  She was seen in my office on 7/21/2021  She may return to work on 07/22/2021  If you have any questions or concerns, please don't hesitate to call           Sincerely,          Don Carreno MD        CC: Alberto Mayra

## 2021-07-21 NOTE — PROGRESS NOTES
Assessment/Plan:  She is doing very well  She can go back to work  No heavy lifting over 15 lb for another 2 weeks  Follow-up with me p r n  No problem-specific Assessment & Plan notes found for this encounter  Diagnoses and all orders for this visit:    Cholelithiasis with chronic cholecystitis          Subjective:      Patient ID: Laurie Grajeda is a 52 y o  female  44-year-old who is 2 weeks status post laparoscopic cholecystectomy  She is doing very well  No pain  She is tolerating diet  Regular bowel movements  The following portions of the patient's history were reviewed and updated as appropriate: allergies, current medications, past family history, past medical history, past social history, past surgical history and problem list     Review of Systems   All other systems reviewed and are negative  Objective:      /72 (BP Location: Right arm, Patient Position: Sitting, Cuff Size: Adult)   Pulse 72   Temp 98 8 °F (37 1 °C)   Resp 18   Ht 5' 4" (1 626 m)   Wt 109 kg (240 lb)   BMI 41 20 kg/m²          Physical Exam  Vitals reviewed  Constitutional:       Appearance: She is obese  Abdominal:      General: Bowel sounds are normal       Palpations: Abdomen is soft  Comments: All incisions are healing well  Neurological:      Mental Status: She is alert

## 2021-08-04 ENCOUNTER — TELEPHONE (OUTPATIENT)
Dept: BARIATRICS | Facility: CLINIC | Age: 51
End: 2021-08-04

## 2022-06-28 ENCOUNTER — TELEPHONE (OUTPATIENT)
Dept: OBGYN CLINIC | Facility: MEDICAL CENTER | Age: 52
End: 2022-06-28

## 2022-06-28 ENCOUNTER — APPOINTMENT (OUTPATIENT)
Dept: RADIOLOGY | Facility: AMBULARY SURGERY CENTER | Age: 52
End: 2022-06-28
Attending: ORTHOPAEDIC SURGERY
Payer: COMMERCIAL

## 2022-06-28 ENCOUNTER — OFFICE VISIT (OUTPATIENT)
Dept: OBGYN CLINIC | Facility: CLINIC | Age: 52
End: 2022-06-28
Payer: COMMERCIAL

## 2022-06-28 VITALS
SYSTOLIC BLOOD PRESSURE: 129 MMHG | DIASTOLIC BLOOD PRESSURE: 82 MMHG | HEIGHT: 64 IN | WEIGHT: 240 LBS | BODY MASS INDEX: 40.97 KG/M2 | HEART RATE: 71 BPM

## 2022-06-28 DIAGNOSIS — S93.492A SPRAIN OF ANTERIOR TALOFIBULAR LIGAMENT OF LEFT ANKLE, INITIAL ENCOUNTER: Primary | ICD-10-CM

## 2022-06-28 DIAGNOSIS — M25.572 PAIN, JOINT, ANKLE AND FOOT, LEFT: ICD-10-CM

## 2022-06-28 PROCEDURE — 99203 OFFICE O/P NEW LOW 30 MIN: CPT | Performed by: ORTHOPAEDIC SURGERY

## 2022-06-28 PROCEDURE — 3008F BODY MASS INDEX DOCD: CPT | Performed by: ORTHOPAEDIC SURGERY

## 2022-06-28 PROCEDURE — 73610 X-RAY EXAM OF ANKLE: CPT

## 2022-06-28 PROCEDURE — 1036F TOBACCO NON-USER: CPT | Performed by: ORTHOPAEDIC SURGERY

## 2022-06-28 NOTE — PROGRESS NOTES
TEODORO Gonzalez  Attending, Orthopaedic Surgery  Foot and [address] [facility] [address] [facility]      ORTHOPAEDIC FOOT AND ANKLE CLINIC VISIT       Assessment:     Encounter Diagnoses   Name Primary?  Pain, joint, ankle and foot, left     Sprain of anterior talofibular ligament of left ankle, initial encounter Yes            Plan:   · The patient verbalized understanding of exam findings and treatment plan  We engaged in the shared decision-making process and treatment options were discussed at length with the patient  · She recently began a physically demanding job after being at a sedentary job previously  She is having pain due to this sudden increase demand on her ankle  · Tylenol and ibuprofen as needed for pain  · PT for under rehabilitated ankle  · Shoe store list provided  Return in about 7 weeks (around 8/16/2022)  History of Present Illness:   Chief Complaint:   Chief Complaint   Patient presents with    Left Ankle - Pain       Naomi Coronel is a 54 y o  female who is being seen for bilateral ankle plain  She states that the left is worse than the right  Pain is localized at lateral and anterior aspect of the ankle on the left with minimal radiating and described as sharp and severe  She has pain with dorsiflexion while walking up stairs and hills  Patient denies numbness, tingling or radicular pain  Denies history of neuropathy  Patient does not smoke, does not have diabetes and does not take blood thinners  Patient denies family history of anesthesia complications and has not had any complications with anesthesia       Pain/symptom timing:  Worse during the day when active  Pain/symptom context:  Worse with activites and work  Pain/symptom modifying factors:  Rest makes better, activities make worse  Pain/symptom associated signs/symptoms: none    Prior treatment   · NSAIDsYes    · Injections No   · Bracing/Orthotics Yes    · Physical Therapy No     Orthopedic Surgical History:   She states that she previously had a ankle arthroscopy on the right ankle for bone spurs  Past Medical History:  Past Medical History:   Diagnosis Date    Arthritis     COVID-19 11/18/2020    Diabetes mellitus ([address] )     gestational    Generalized anxiety disorder 4/13/2015    GERD (gastroesophageal reflux disease)     History of gestational diabetes 1999, 2003    IFG (impaired fasting glucose)     Obesity     Osteoarthritis 2015    Osteoarthritis (Ankles & Knees)    Other hyperlipidemia 6/3/2021    Psoriasis 1/1/2015    Scalp psoriasis 6/4/2021    Vitamin D deficiency 9/25/2019       Past Surgical History:   Procedure Laterality Date    ARTHROSCOPY ANKLE Right 2016    [address]    AL LAP,CHOLECYSTECTOMY N/A 7/8/2021    Procedure: LAPAROSCOPIC POSSIBLE OPEN CHOLECYSTECTOMY;  Surgeon: Melania Mcelroy MD;  Location:  MAIN OR;  Service: General    TONSILLECTOMY         The patient has a family history of        Current Outpatient Medications:     clobetasol (OLUX) 0 05 % topical foam, Apply 1 application topically 2 (two) times a day as needed (Scalp Psoriasis) For Scalp Psoriasis  , Disp: 100 g, Rfl: 0    triamcinolone (KENALOG) 0 1 % cream, Apply 1 application topically 2 (two) times a day as needed , Disp: , Rfl:     ergocalciferol (ERGOCALCIFEROL) 1 25 MG (86064 UT) capsule, Take 1 capsule (50,000 Units total) by mouth once a week for 12 doses, Disp: 12 capsule, Rfl: 0    ibuprofen (MOTRIN) 200 mg tablet, Take 2 tablets by mouth as needed  (Patient not taking: Reported on 6/28/2022), Disp: , Rfl:       No Known Allergies    Review of Systems:  A comprehensive 14 point ROS was performed, reviewed, and the pertinent orthopaedic findings are documented in the HPI      Physical Exam:   /82 (BP Location: Right arm, Patient Position: Sitting, Cuff Size: Adult)   Pulse 71   Ht 5' 4" (1 626 m)   Wt 109 kg (240 lb)   BMI 41 20 kg/m² General/Constitutional: No apparent distress: well-nourished and well developed  Eyes: normal ocular motion  Lymphatic: No appreciable lymphadenopathy  Respiratory: Non-labored breathing  Vascular: No edema, swelling or tenderness, except as noted in detailed exam   Integumentary: No impressive skin lesions present, except as noted in detailed exam   Neuro: No ataxia or tremors noted  Psych: Normal mood and affect, oriented to person, place and time  Appropriate affect  Musculoskeletal: Normal, except as noted in detailed exam and in HPI  Examination      Right Left   Gait Normal   Musculoskeletal No Tender to palpation Tender to palpation at dorsal and lateral   Skin Normal    Normal      Nails Normal Normal   Range of Motion  15 degrees dorsiflexion, 45 degrees plantarflexion  Subtalar motion: normal 20 degrees dorsiflexion, 40 degrees plantarflexion  Subtalar motion: stable and normal   Stability Stable Stable   Muscle Strength 5/5 tibialis anterior  5/5 gastrocnemius-soleus  5/5 posterior tibialis  5/5 peroneal/eversion strength  5/5 EHL  5/5 FHL 5/5 tibialis anterior  5/5 gastrocnemius-soleus  5/5 posterior tibialis  5/5 peroneal/eversion strength  5/5 EHL  5/5 FHL   Neurologic Normal Normal   Sensation Intact to light touch throughout sural, saphenous, superficial peroneal, deep peroneal and medial/lateral plantar nerve distributions  Intact to light touch throughout sural, saphenous, superficial peroneal, deep peroneal and medial/lateral plantar nerve distributions  Cardiovascular Brisk capillary refill < 2 seconds,intact DP and PT pulses Brisk capillary refill < 2 seconds,intact DP and PT pulses   Special Tests None None       Imaging Studies:   3 views of the left ankle were taken, reviewed and interpreted independently that demonstrate small osteophyte formation noted off the anterior talus  Reviewed by me personally  Octaviano Cyphers Lachman, MD  Attending, Foot & Ankle Service  Department of [address] [place] [address]      I personally performed the service  Albertine Pedro Lachman, MD

## 2022-06-28 NOTE — TELEPHONE ENCOUNTER
Patient scheduled to see Dr Jennifer Gee  Patient calling to let office know she will be about 5-6 ,minutes late, she got caught behind a garbage truck  Notified office, confirmed ok

## 2022-06-28 NOTE — PATIENT INSTRUCTIONS
[employer], [employer], [employer] are good brands but I recommend going to a dedicate shoe store (not [employer] or [employer] ) At these types of stores, they have experts that can fit you for shoes appropriate for your foot problem  [employer]  [address], [place] [address], [address]    [facility] [address] [facility], [address] [place], [address] [place] [address]    [employer]   [address], [address] [facility]  [address] [facility] [place], [place], [facility] [place]     [employer]  [address], [place], [address] [facility], [address] [place] [address]    [employer]  [address], [place]

## 2022-07-13 DIAGNOSIS — L40.9 SCALP PSORIASIS: ICD-10-CM

## 2022-07-14 RX ORDER — CLOBETASOL PROPIONATE 0.5 MG/G
1 AEROSOL, FOAM TOPICAL 2 TIMES DAILY PRN
Qty: 100 G | Refills: 0 | OUTPATIENT
Start: 2022-07-14

## 2022-08-12 ENCOUNTER — OFFICE VISIT (OUTPATIENT)
Dept: URGENT CARE | Facility: CLINIC | Age: 52
End: 2022-08-12
Payer: COMMERCIAL

## 2022-08-12 VITALS — HEART RATE: 73 BPM | SYSTOLIC BLOOD PRESSURE: 128 MMHG | RESPIRATION RATE: 16 BRPM | DIASTOLIC BLOOD PRESSURE: 61 MMHG

## 2022-08-12 DIAGNOSIS — M22.2X1 PATELLOFEMORAL SYNDROME OF RIGHT KNEE: Primary | ICD-10-CM

## 2022-08-12 PROCEDURE — G0383 LEV 4 HOSP TYPE B ED VISIT: HCPCS | Performed by: FAMILY MEDICINE

## 2022-08-12 RX ORDER — DICLOFENAC SODIUM 75 MG/1
75 TABLET, DELAYED RELEASE ORAL 2 TIMES DAILY
Qty: 60 TABLET | Refills: 0 | Status: SHIPPED | OUTPATIENT
Start: 2022-08-12

## 2022-08-12 NOTE — PROGRESS NOTES
[facility]        NAME: Crow Apple is a 54 y o  female  : 1970    MRN: [id number]  DATE: 2022  TIME: 7:04 PM    Assessment and Plan   Patellofemoral syndrome of right knee [M22 2X1]  1  Patellofemoral syndrome of right knee  diclofenac (VOLTAREN) 75 mg EC tablet    Diclofenac Sodium (VOLTAREN) 1 %         Patient Instructions     Patellofemoral pain syndrome of the right knee  Recommend diclofenac orally and in cream form  Strictly peripatellar tenderness - no indication for x-ray at this time  Follow-up with PCP in 3-5 days if no improvement  Recommend physical therapy at that time  Chief Complaint     Chief Complaint   Patient presents with    Knee Pain     States he injured R knee today when going down a step  History of Present Illness       53-year-old female presents today complaining of right knee pain  She states the pain is around the patella  She notes that when she sits for long periods of time and get stiffer  She states that she was walking out of her house, when she forgot that the ramp had been removed and stepped down on the concrete forcefully  She denies any twisting mechanism  She states that it was a vertical impact  She denies pain around the knee, with the exception of the patella  Review of Systems   Review of Systems   Constitutional: Negative for chills, fatigue and fever  HENT: Negative for postnasal drip and sore throat  Respiratory: Negative for cough and shortness of breath  Cardiovascular: Negative for chest pain and palpitations  Gastrointestinal: Negative for abdominal pain, nausea and vomiting  Genitourinary: Negative for dysuria  Musculoskeletal: Positive for gait problem and joint swelling  Skin: Negative for rash  Neurological: Negative for dizziness, syncope, light-headedness, numbness and headaches  Psychiatric/Behavioral: Negative for agitation and confusion     All other systems reviewed and are negative  Current Medications       Current Outpatient Medications:     clobetasol (OLUX) 0 05 % topical foam, Apply 1 application topically 2 (two) times a day as needed (Scalp Psoriasis) For Scalp Psoriasis  , Disp: 100 g, Rfl: 0    diclofenac (VOLTAREN) 75 mg EC tablet, Take 1 tablet (75 mg total) by mouth 2 (two) times a day, Disp: 60 tablet, Rfl: 0    Diclofenac Sodium (VOLTAREN) 1 %, Apply 2 g topically 4 (four) times a day, Disp: 150 g, Rfl: 0    triamcinolone (KENALOG) 0 1 % cream, Apply 1 application topically 2 (two) times a day as needed , Disp: , Rfl:     ergocalciferol (ERGOCALCIFEROL) 1 25 MG (35917 UT) capsule, Take 1 capsule (50,000 Units total) by mouth once a week for 12 doses, Disp: 12 capsule, Rfl: 0    ibuprofen (MOTRIN) 200 mg tablet, Take 2 tablets by mouth as needed  (Patient not taking: No sig reported), Disp: , Rfl:     Current Allergies     Allergies as of 08/12/2022    (No Known Allergies)            The following portions of the patient's history were reviewed and updated as appropriate: allergies, current medications, past family history, past medical history, past social history, past surgical history and problem list      Past Medical History:   Diagnosis Date    Arthritis     COVID-19 11/18/2020    Diabetes mellitus ([facility] [address] )     gestational    Generalized anxiety disorder 4/13/2015    GERD (gastroesophageal reflux disease)     History of gestational diabetes 1999, 2003    IFG (impaired fasting glucose)     Obesity     Osteoarthritis 2015    Osteoarthritis (Ankles & Knees)    Other hyperlipidemia 6/3/2021    Psoriasis 1/1/2015    Scalp psoriasis 6/4/2021    Vitamin D deficiency 9/25/2019       Past Surgical History:   Procedure Laterality Date    ARTHROSCOPY ANKLE Right 2016    [address]    MT LAP,CHOLECYSTECTOMY N/A 7/8/2021    Procedure: LAPAROSCOPIC POSSIBLE OPEN CHOLECYSTECTOMY;  Surgeon: Meliza Geiger MD;  Location:  MAIN OR; Service: General    TONSILLECTOMY         Family History   Problem Relation Age of Onset    Diabetes Father 36        Type Unknown    Heart attack Father 32    Coronary artery disease Father 32    Heart defect Mother         R to L Shunt    Muscular dystrophy Mother 32    No Known Problems Brother     No Known Problems Son     Diabetes Maternal Grandmother     Kidney disease Maternal Grandmother     No Known Problems Maternal Grandfather     Diabetes Paternal Grandmother     Heart disease Paternal Grandmother     Diabetes Paternal Grandfather     Heart disease Paternal Grandfather     No Known Problems Son          Medications have been verified  Objective   /61 (Patient Position: Sitting)   Pulse 73   Resp 16   No LMP recorded  Physical Exam     Physical Exam  Vitals reviewed  Constitutional:       General: She is not in acute distress  Appearance: Normal appearance  She is not ill-appearing  HENT:      Head: Normocephalic and atraumatic  Eyes:      Extraocular Movements: Extraocular movements intact  Conjunctiva/sclera: Conjunctivae normal    Musculoskeletal:         General: Tenderness (peripatellar) and signs of injury present  Cervical back: Normal range of motion  Right knee: Decreased range of motion  No medial joint line or lateral joint line tenderness  No LCL laxity, MCL laxity, ACL laxity or PCL laxity  Skin:     General: Skin is warm  Neurological:      General: No focal deficit present  Mental Status: She is alert     Psychiatric:         Mood and Affect: Mood normal          Behavior: Behavior normal          Judgment: Judgment normal

## 2022-08-12 NOTE — PATIENT INSTRUCTIONS
Patellofemoral Pain Syndrome   AMBULATORY CARE:   Patellofemoral pain syndrome (PFPS)  is pain in or around your patella (kneecap)  PFPS is also called runner's knee or jumper's knee and is common in athletes  Common signs and symptoms of PFPS:   Popping or crackling sounds when you move your knee    Pain when you go up or down the stairs, squat, run, or ride a bike    Pain after you sit for a long time with your knees bent    Swelling, stiffness, or weakness in your knee    Call your doctor if:   You have trouble walking  You have a fever  Your knee brace or sleeve is too tight  Your symptoms are not getting better, or they get worse  Your pain and swelling increase even after you take pain medicine  You have questions or concerns about your condition or care  Treatment  may include any of the following:  Acetaminophen  decreases pain and fever  It is available without a doctor's order  Ask how much to take and how often to take it  Follow directions  Read the labels of all other medicines you are using to see if they also contain acetaminophen, or ask your doctor or pharmacist  Acetaminophen can cause liver damage if not taken correctly  Do not use more than 4 grams (4,000 milligrams) total of acetaminophen in one day  NSAIDs , such as ibuprofen, help decrease swelling, pain, and fever  This medicine is available with or without a doctor's order  NSAIDs can cause stomach bleeding or kidney problems in certain people  If you take blood thinner medicine, always ask your healthcare provider if NSAIDs are safe for you  Always read the medicine label and follow directions  Surgery  may be needed if other treatments do not work  During surgery, the back of your kneecap is smoothed  A ligament may also be cut to allow the knee to return to its normal position  Surgery may be done through small incisions as during arthroscopy, or you may need a larger incision   A cut in your shin bone may also be needed to help line up your kneecap correctly  Prevent another episode:   Wear the right shoes for your activities  Increase activity gradually  Warm up before you exercise  Stretch your leg muscles before and after activity  Manage your symptoms:       Change your activity  You may need to rest your knee  You may need to change your exercise routine to low-impact activities  Apply ice  on your knee for 15 to 20 minutes every hour or as directed  Use an ice pack, or put crushed ice in a plastic bag  Cover it with a towel before you apply it  Ice helps prevent tissue damage and decreases swelling and pain  Elevate  your knee above the level of your heart as often as you can  This will help decrease swelling and pain  Prop your leg on pillows or blankets to keep it elevated comfortably  Do not  put a pillow directly under your knee  Support  your knee by wrapping it with tape or an elastic bandage  You may need a brace for more support  This will help decrease swelling and keep your kneecap in the correct spot  Wear shoe inserts as directed  Orthotics or arch supports help keep your foot and ankle stable and in line to decrease stress on your knee  Go to physical therapy as directed  A physical therapist will teach you exercises to help improve movement and strength, and to decrease pain  Maintain a healthy weight  Ask your healthcare provider what a healthy weight is for you  Ask him or her to help you create a weight loss plan if you are overweight  Weight loss can help decrease pressure on your knee  Follow up with your doctor as directed: You may be referred to an orthopedic surgeon  Write down your questions so you remember to ask them during your visits  © Copyright [employer] 2022 Information is for End User's use only and may not be sold, redistributed or otherwise used for commercial purposes   All illustrations and images included in CareNotes® are the copyrighted property of A D A M , Inc  or [facility] Padmaja Puri   The above information is an  only  It is not intended as medical advice for individual conditions or treatments  Talk to your doctor, nurse or pharmacist before following any medical regimen to see if it is safe and effective for you

## 2022-08-23 ENCOUNTER — LAB (OUTPATIENT)
Dept: LAB | Facility: CLINIC | Age: 52
End: 2022-08-23
Payer: COMMERCIAL

## 2022-08-23 ENCOUNTER — OFFICE VISIT (OUTPATIENT)
Dept: FAMILY MEDICINE CLINIC | Facility: CLINIC | Age: 52
End: 2022-08-23
Payer: COMMERCIAL

## 2022-08-23 ENCOUNTER — TRANSCRIBE ORDERS (OUTPATIENT)
Dept: PAIN MEDICINE | Facility: CLINIC | Age: 52
End: 2022-08-23

## 2022-08-23 VITALS
TEMPERATURE: 97 F | DIASTOLIC BLOOD PRESSURE: 72 MMHG | SYSTOLIC BLOOD PRESSURE: 116 MMHG | HEART RATE: 63 BPM | BODY MASS INDEX: 39.27 KG/M2 | HEIGHT: 64 IN | OXYGEN SATURATION: 99 % | WEIGHT: 230 LBS

## 2022-08-23 DIAGNOSIS — F41.1 GENERALIZED ANXIETY DISORDER: ICD-10-CM

## 2022-08-23 DIAGNOSIS — E66.9 OBESITY (BMI 35.0-39.9 WITHOUT COMORBIDITY): ICD-10-CM

## 2022-08-23 DIAGNOSIS — Z00.00 ANNUAL PHYSICAL EXAM: Primary | ICD-10-CM

## 2022-08-23 DIAGNOSIS — Z13.6 SCREENING FOR CARDIOVASCULAR CONDITION: ICD-10-CM

## 2022-08-23 DIAGNOSIS — L40.9 SCALP PSORIASIS: ICD-10-CM

## 2022-08-23 DIAGNOSIS — E55.9 VITAMIN D DEFICIENCY: ICD-10-CM

## 2022-08-23 DIAGNOSIS — E78.49 OTHER HYPERLIPIDEMIA: ICD-10-CM

## 2022-08-23 DIAGNOSIS — M15.9 OSTEOARTHRITIS OF MULTIPLE JOINTS, UNSPECIFIED OSTEOARTHRITIS TYPE: ICD-10-CM

## 2022-08-23 DIAGNOSIS — Z12.31 ENCOUNTER FOR SCREENING MAMMOGRAM FOR BREAST CANCER: ICD-10-CM

## 2022-08-23 DIAGNOSIS — Z13.1 SCREENING FOR DIABETES MELLITUS: ICD-10-CM

## 2022-08-23 DIAGNOSIS — R73.01 IFG (IMPAIRED FASTING GLUCOSE): ICD-10-CM

## 2022-08-23 DIAGNOSIS — K21.9 GASTROESOPHAGEAL REFLUX DISEASE, UNSPECIFIED WHETHER ESOPHAGITIS PRESENT: ICD-10-CM

## 2022-08-23 DIAGNOSIS — E66.01 SEVERE OBESITY (BMI 35.0-39.9) WITH COMORBIDITY (HCC): ICD-10-CM

## 2022-08-23 DIAGNOSIS — L40.9 PSORIASIS: ICD-10-CM

## 2022-08-23 DIAGNOSIS — Z12.12 SCREENING FOR COLORECTAL CANCER: ICD-10-CM

## 2022-08-23 DIAGNOSIS — Z12.4 SCREENING FOR CERVICAL CANCER: ICD-10-CM

## 2022-08-23 DIAGNOSIS — Z12.11 SCREENING FOR COLORECTAL CANCER: ICD-10-CM

## 2022-08-23 DIAGNOSIS — E66.01 CLASS 2 SEVERE OBESITY WITH SERIOUS COMORBIDITY AND BODY MASS INDEX (BMI) OF 39.0 TO 39.9 IN ADULT, UNSPECIFIED OBESITY TYPE (HCC): ICD-10-CM

## 2022-08-23 LAB
25(OH)D3 SERPL-MCNC: 21.2 NG/ML (ref 30–100)
ALBUMIN SERPL BCP-MCNC: 4 G/DL (ref 3.5–5)
ALP SERPL-CCNC: 81 U/L (ref 34–104)
ALT SERPL W P-5'-P-CCNC: 20 U/L (ref 7–52)
ANION GAP SERPL CALCULATED.3IONS-SCNC: 6 MMOL/L (ref 4–13)
AST SERPL W P-5'-P-CCNC: 19 U/L (ref 13–39)
BASOPHILS # BLD AUTO: 0.04 THOUSANDS/ΜL (ref 0–0.1)
BASOPHILS NFR BLD AUTO: 1 % (ref 0–1)
BILIRUB SERPL-MCNC: 0.5 MG/DL (ref 0.2–1)
BUN SERPL-MCNC: 11 MG/DL (ref 5–25)
CALCIUM SERPL-MCNC: 9.3 MG/DL (ref 8.4–10.2)
CHLORIDE SERPL-SCNC: 106 MMOL/L (ref 96–108)
CHOLEST SERPL-MCNC: 207 MG/DL
CO2 SERPL-SCNC: 26 MMOL/L (ref 21–32)
CREAT SERPL-MCNC: 0.77 MG/DL (ref 0.6–1.3)
EOSINOPHIL # BLD AUTO: 0.37 THOUSAND/ΜL (ref 0–0.61)
EOSINOPHIL NFR BLD AUTO: 6 % (ref 0–6)
ERYTHROCYTE [DISTWIDTH] IN BLOOD BY AUTOMATED COUNT: 15.2 % (ref 11.6–15.1)
EST. AVERAGE GLUCOSE BLD GHB EST-MCNC: 123 MG/DL
GFR SERPL CREATININE-BSD FRML MDRD: 90 ML/MIN/1.73SQ M
GLUCOSE P FAST SERPL-MCNC: 85 MG/DL (ref 65–99)
HBA1C MFR BLD: 5.9 %
HCT VFR BLD AUTO: 36.1 % (ref 34.8–46.1)
HDLC SERPL-MCNC: 53 MG/DL
HGB BLD-MCNC: 11.8 G/DL (ref 11.5–15.4)
IMM GRANULOCYTES # BLD AUTO: 0.02 THOUSAND/UL (ref 0–0.2)
IMM GRANULOCYTES NFR BLD AUTO: 0 % (ref 0–2)
LDLC SERPL CALC-MCNC: 132 MG/DL (ref 0–100)
LDLC SERPL DIRECT ASSAY-MCNC: 130 MG/DL (ref 0–100)
LYMPHOCYTES # BLD AUTO: 2.24 THOUSANDS/ΜL (ref 0.6–4.47)
LYMPHOCYTES NFR BLD AUTO: 35 % (ref 14–44)
MCH RBC QN AUTO: 29.9 PG (ref 26.8–34.3)
MCHC RBC AUTO-ENTMCNC: 32.7 G/DL (ref 31.4–37.4)
MCV RBC AUTO: 91 FL (ref 82–98)
MONOCYTES # BLD AUTO: 0.46 THOUSAND/ΜL (ref 0.17–1.22)
MONOCYTES NFR BLD AUTO: 7 % (ref 4–12)
NEUTROPHILS # BLD AUTO: 3.34 THOUSANDS/ΜL (ref 1.85–7.62)
NEUTS SEG NFR BLD AUTO: 51 % (ref 43–75)
NONHDLC SERPL-MCNC: 154 MG/DL
NRBC BLD AUTO-RTO: 0 /100 WBCS
PLATELET # BLD AUTO: 296 THOUSANDS/UL (ref 149–390)
PMV BLD AUTO: 10.2 FL (ref 8.9–12.7)
POTASSIUM SERPL-SCNC: 4.6 MMOL/L (ref 3.5–5.3)
PROT SERPL-MCNC: 7.2 G/DL (ref 6.4–8.4)
RBC # BLD AUTO: 3.95 MILLION/UL (ref 3.81–5.12)
SODIUM SERPL-SCNC: 138 MMOL/L (ref 135–147)
TRIGL SERPL-MCNC: 109 MG/DL
TSH SERPL DL<=0.05 MIU/L-ACNC: 1.5 UIU/ML (ref 0.45–4.5)
WBC # BLD AUTO: 6.47 THOUSAND/UL (ref 4.31–10.16)

## 2022-08-23 PROCEDURE — 99213 OFFICE O/P EST LOW 20 MIN: CPT | Performed by: FAMILY MEDICINE

## 2022-08-23 PROCEDURE — 83721 ASSAY OF BLOOD LIPOPROTEIN: CPT

## 2022-08-23 PROCEDURE — 85025 COMPLETE CBC W/AUTO DIFF WBC: CPT

## 2022-08-23 PROCEDURE — 84443 ASSAY THYROID STIM HORMONE: CPT

## 2022-08-23 PROCEDURE — 99396 PREV VISIT EST AGE 40-64: CPT | Performed by: FAMILY MEDICINE

## 2022-08-23 PROCEDURE — 80053 COMPREHEN METABOLIC PANEL: CPT

## 2022-08-23 PROCEDURE — 83036 HEMOGLOBIN GLYCOSYLATED A1C: CPT

## 2022-08-23 PROCEDURE — 3725F SCREEN DEPRESSION PERFORMED: CPT | Performed by: FAMILY MEDICINE

## 2022-08-23 PROCEDURE — 80061 LIPID PANEL: CPT

## 2022-08-23 PROCEDURE — 36415 COLL VENOUS BLD VENIPUNCTURE: CPT

## 2022-08-23 PROCEDURE — 82306 VITAMIN D 25 HYDROXY: CPT

## 2022-08-23 RX ORDER — CLOBETASOL PROPIONATE 0.5 MG/G
1 AEROSOL, FOAM TOPICAL 2 TIMES DAILY PRN
Qty: 100 G | Refills: 0 | Status: SHIPPED | OUTPATIENT
Start: 2022-08-23

## 2022-08-23 NOTE — RESULT ENCOUNTER NOTE
Unstable labs - will review with patient at upcoming appointment  IFG - Worsening  To consider Metformin? Hyperlipidemia - Recommend lifestyle modifications  The 10-year ASCVD risk score (Raheem Hinds et al , 2013) is: 1%    Values used to calculate the score:      Age: 54 years      Sex: Female      Is Non- : No      Diabetic: No      Tobacco smoker: No      Systolic Blood Pressure: 280 mmHg      Is BP treated: No      HDL Cholesterol: 53 mg/dL      Total Cholesterol: 207 mg/dL    Low vitamin D - Recommend start multivitamin and over-the-counter vitamin D3 1000 - 3000 International Units daily

## 2022-08-23 NOTE — PATIENT INSTRUCTIONS
Wellness Visit for Adults   AMBULATORY CARE:   A wellness visit  is when you see your healthcare provider to get screened for health problems  Your healthcare provider will also give you advice on how to stay healthy  Write down your questions so you remember to ask them  Ask your healthcare provider how often you should have a wellness visit  What happens at a wellness visit:  Your healthcare provider will ask about your health, and your family history of health problems  This includes high blood pressure, heart disease, and cancer  He or she will ask if you have symptoms that concern you, if you smoke, and about your mood  You may also be asked about your intake of medicines, supplements, food, and alcohol  Any of the following may be done:  · Your weight  will be checked  Your height may also be checked so your body mass index (BMI) can be calculated  Your BMI shows if you are at a healthy weight  · Your blood pressure  and heart rate will be checked  Your temperature may also be checked  · Blood and urine tests  may be done  Blood tests may be done to check your cholesterol levels  Abnormal cholesterol levels increase your risk for heart disease and stroke  You may also need a blood or urine test to check for diabetes if you are at increased risk  Urine tests may be done to look for signs of an infection or kidney disease  · A physical exam  includes checking your heartbeat and lungs with a stethoscope  Your healthcare provider may also check your skin to look for sun damage  · Screening tests  may be recommended  A screening test is done to check for diseases that may not cause symptoms  The screening tests you may need depend on your age, gender, family history, and lifestyle habits  For example, colorectal screening may be recommended if you are 48years old or older  Screening tests you need if you are a woman:   · A Pap smear  is used to screen for cervical cancer   Pap smears are usually done every 3 to 5 years depending on your age  You may need them more often if you have had abnormal Pap smear test results in the past  Ask your healthcare provider how often you should have a Pap smear  · A mammogram  is an x-ray of your breasts to screen for breast cancer  Experts recommend mammograms every 2 years starting at age 48 years  You may need a mammogram at age 52 years or younger if you have an increased risk for breast cancer  Talk to your healthcare provider about when you should start having mammograms and how often you need them  Vaccines you may need:   · Get an influenza vaccine  every year  The influenza vaccine protects you from the flu  Several types of viruses cause the flu  The viruses change over time, so new vaccines are made each year  · Get a tetanus-diphtheria (Td) booster vaccine  every 10 years  This vaccine protects you against tetanus and diphtheria  Tetanus is a severe infection that may cause painful muscle spasms and lockjaw  Diphtheria is a severe bacterial infection that causes a thick covering in the back of your mouth and throat  · Get a human papillomavirus (HPV) vaccine  if you are female and aged 23 to 32 or male 23 to 24 and never received it  This vaccine protects you from HPV infection  HPV is the most common infection spread by sexual contact  HPV may also cause vaginal, penile, and anal cancers  · Get a pneumococcal vaccine  if you are aged 72 years or older  The pneumococcal vaccine is an injection given to protect you from pneumococcal disease  Pneumococcal disease is an infection caused by pneumococcal bacteria  The infection may cause pneumonia, meningitis, or an ear infection  · Get a shingles vaccine  if you are 60 or older, even if you have had shingles before  The shingles vaccine is an injection to protect you from the varicella-zoster virus  This is the same virus that causes chickenpox   Shingles is a painful rash that develops in people who had chickenpox or have been exposed to the virus  How to eat healthy:  My Plate is a model for planning healthy meals  It shows the types and amounts of foods that should go on your plate  Fruits and vegetables make up about half of your plate, and grains and protein make up the other half  A serving of dairy is included on the side of your plate  The amount of calories and serving sizes you need depends on your age, gender, weight, and height  Examples of healthy foods are listed below:  · Eat a variety of vegetables  such as dark green, red, and orange vegetables  You can also include canned vegetables low in sodium (salt) and frozen vegetables without added butter or sauces  · Eat a variety of fresh fruits , canned fruit in 100% juice, frozen fruit, and dried fruit  · Include whole grains  At least half of the grains you eat should be whole grains  Examples include whole-wheat bread, wheat pasta, brown rice, and whole-grain cereals such as oatmeal     · Eat a variety of protein foods such as seafood (fish and shellfish), lean meat, and poultry without skin (turkey and chicken)  Examples of lean meats include pork leg, shoulder, or tenderloin, and beef round, sirloin, tenderloin, and extra lean ground beef  Other protein foods include eggs and egg substitutes, beans, peas, soy products, nuts, and seeds  · Choose low-fat dairy products such as skim or 1% milk or low-fat yogurt, cheese, and cottage cheese  · Limit unhealthy fats  such as butter, hard margarine, and shortening  Exercise:  Exercise at least 30 minutes per day on most days of the week  Some examples of exercise include walking, biking, dancing, and swimming  You can also fit in more physical activity by taking the stairs instead of the elevator or parking farther away from stores  Include muscle strengthening activities 2 days each week  Regular exercise provides many health benefits   It helps you manage your weight, and decreases your risk for type 2 diabetes, heart disease, stroke, and high blood pressure  Exercise can also help improve your mood  Ask your healthcare provider about the best exercise plan for you  General health and safety guidelines:   · Do not smoke  Nicotine and other chemicals in cigarettes and cigars can cause lung damage  Ask your healthcare provider for information if you currently smoke and need help to quit  E-cigarettes or smokeless tobacco still contain nicotine  Talk to your healthcare provider before you use these products  · Limit alcohol  A drink of alcohol is 12 ounces of beer, 5 ounces of wine, or 1½ ounces of liquor  · Lose weight, if needed  Being overweight increases your risk of certain health conditions  These include heart disease, high blood pressure, type 2 diabetes, and certain types of cancer  · Protect your skin  Do not sunbathe or use tanning beds  Use sunscreen with a SPF 15 or higher  Apply sunscreen at least 15 minutes before you go outside  Reapply sunscreen every 2 hours  Wear protective clothing, hats, and sunglasses when you are outside  · Drive safely  Always wear your seatbelt  Make sure everyone in your car wears a seatbelt  A seatbelt can save your life if you are in an accident  Do not use your cell phone when you are driving  This could distract you and cause an accident  Pull over if you need to make a call or send a text message  · Practice safe sex  Use latex condoms if are sexually active and have more than one partner  Your healthcare provider may recommend screening tests for sexually transmitted infections (STIs)  · Wear helmets, lifejackets, and protective gear  Always wear a helmet when you ride a bike or motorcycle, go skiing, or play sports that could cause a head injury  Wear protective equipment when you play sports  Wear a lifejacket when you are on a boat or doing water sports      © Copyright [employer] 2022 Information is for End User's use only and may not be sold, redistributed or otherwise used for commercial purposes  All illustrations and images included in CareNotes® are the copyrighted property of [employer]  or Chelsy Vargas  The above information is an  only  It is not intended as medical advice for individual conditions or treatments  Talk to your doctor, nurse or pharmacist before following any medical regimen to see if it is safe and effective for you  Obesity   AMBULATORY CARE:   Obesity  means your body mass index (BMI) is greater than 30  Your healthcare provider will use your height and weight to measure your BMI  The risks of obesity include  many health problems, including injuries or physical disability  · Diabetes (high blood sugar level)    · High blood pressure or high cholesterol    · Heart disease    · Stroke    · Gallbladder or liver disease    · Cancer of the colon, breast, prostate, liver, or kidney    · Sleep apnea    · Arthritis or gout    Screening  is done to check for health conditions before you have signs or symptoms  If you are 28to 79years old, your blood sugar level may be checked every 3 years for signs of prediabetes or diabetes  Your healthcare provider will check your blood pressure at each visit  High blood pressure can lead to a stroke or other problems  Your provider may check for signs of heart disease, cancer, or other health problems  Seek care immediately if:   · You have a severe headache, confusion, or difficulty speaking  · You have weakness on one side of your body  · You have chest pain, sweating, or shortness of breath  Call your doctor if:   · You have symptoms of gallbladder or liver disease, such as pain in your upper abdomen  · You have knee or hip pain and discomfort while walking  · You have symptoms of diabetes, such as intense hunger and thirst, and frequent urination      · You have symptoms of sleep apnea, such as snoring or daytime sleepiness  · You have questions or concerns about your condition or care  Treatment for obesity  focuses on helping you lose weight to improve your health  Even a small decrease in BMI can reduce the risk for many health problems  Your healthcare provider will help you set a weight-loss goal   · Lifestyle changes  are the first step in treating obesity  These include making healthy food choices and getting regular physical activity  Your healthcare provider may suggest a weight-loss program that involves coaching, education, and therapy  · Medicine  may help you lose weight when it is used with a healthy foods and physical activity  · Surgery  can help you lose weight if you are very obese and have other health problems  There are several types of weight-loss surgery  Ask your healthcare provider for more information  Tips for safe weight loss:   · Set small, realistic goals  An example of a small goal is to walk for 20 minutes 5 days a week  Anther goal is to lose 5% of your body weight  · Tell friends, family members, and coworkers about your goals  and ask for their support  Ask a friend to lose weight with you, or join a weight-loss support group  · Identify foods or triggers that may cause you to overeat , and find ways to avoid them  Remove tempting high-calorie foods from your home and workplace  Place a bowl of fresh fruit on your kitchen counter  If stress causes you to eat, then find other ways to cope with stress  A counselor or therapist may be able to help you  · Keep a diary to track what you eat and drink  Also write down how many minutes of physical activity you do each day  Weigh yourself once a week and record it in your diary  Eating changes: You will need to eat 500 to 1,000 fewer calories each day than you currently eat to lose 1 to 2 pounds a week  The following changes will help you cut calories:  · Eat smaller portions    Use small plates, no larger than 9 inches in diameter  Fill your plate half full of fruits and vegetables  Measure your food using measuring cups until you know what a serving size looks like  · Eat 3 meals and 1 or 2 snacks each day  Plan your meals in advance  Vannessa Laughter and eat at home most of the time  Eat slowly  Do not skip meals  Skipping meals can lead to overeating later in the day  This can make it harder for you to lose weight  Talk with a dietitian to help you make a meal plan and schedule that is right for you  · Eat fruits and vegetables at every meal   They are low in calories and high in fiber, which makes you feel full  Do not add butter, margarine, or cream sauce to vegetables  Use herbs to season steamed vegetables  · Eat less fat and fewer fried foods  Eat more baked or grilled chicken and fish  These protein sources are lower in calories and fat than red meat  Limit fast food  Dress your salads with olive oil and vinegar instead of bottled dressing  · Limit the amount of sugar you eat  Do not drink sugary beverages  Limit alcohol  Activity changes:  Physical activity is good for your body in many ways  It helps you burn calories and build strong muscles  It decreases stress and depression, and improves your mood  It can also help you sleep better  Talk to your healthcare provider before you begin an exercise program   · Exercise for at least 30 minutes 5 days a week  Start slowly  Set aside time each day for physical activity that you enjoy and that is convenient for you  It is best to do both weight training and an activity that increases your heart rate, such as walking, bicycling, or swimming  · Find ways to be more active  Do yard work and housecleaning  Walk up the stairs instead of using elevators  Spend your leisure time going to events that require walking, such as outdoor festivals or fairs  This extra physical activity can help you lose weight and keep it off         Follow up with your doctor as directed: You may need to meet with a dietitian  Write down your questions so you remember to ask them during your visits  © Copyright [employer] 2022 Information is for End User's use only and may not be sold, redistributed or otherwise used for commercial purposes  All illustrations and images included in CareNotes® are the copyrighted property of [employer]  or Chelsy Puri   The above information is an  only  It is not intended as medical advice for individual conditions or treatments  Talk to your doctor, nurse or pharmacist before following any medical regimen to see if it is safe and effective for you  Cholesterol and Your Health   AMBULATORY CARE:   Cholesterol  is a waxy, fat-like substance  Your body uses cholesterol to make hormones and new cells, and to protect nerves  Cholesterol is made by your body  It also comes from certain foods you eat, such as meat and dairy products  Your healthcare provider can help you set goals for your cholesterol levels  He or she can help you create a plan to meet your goals  Cholesterol level goals: Your cholesterol level goals depend on your risk for heart disease, your age, and your other health conditions  The following are general guidelines:  · Total cholesterol  includes low-density lipoprotein (LDL), high-density lipoprotein (HDL), and triglyceride levels  The total cholesterol level should be lower than 200 mg/dL and is best at about 150 mg/dL  · LDL cholesterol  is called bad cholesterol  because it forms plaque in your arteries  As plaque builds up, your arteries become narrow, and less blood flows through  When plaque decreases blood flow to your heart, you may have chest pain  If plaque completely blocks an artery that brings blood to your heart, you may have a heart attack  Plaque can break off and form blood clots  Blood clots may block arteries in your brain and cause a stroke   The level should be less than 130 mg/dL and is best at about 100 mg/dL  · HDL cholesterol  is called good cholesterol  because it helps remove LDL cholesterol from your arteries  It does this by attaching to LDL cholesterol and carrying it to your liver  Your liver breaks down LDL cholesterol so your body can get rid of it  High levels of HDL cholesterol can help prevent a heart attack and stroke  Low levels of HDL cholesterol can increase your risk for heart disease, heart attack, and stroke  The level should be 60 mg/dL or higher  · Triglycerides  are a type of fat that store energy from foods you eat  High levels of triglycerides also cause plaque buildup  This can increase your risk for a heart attack or stroke  If your triglyceride level is high, your LDL cholesterol level may also be high  The level should be less than 150 mg/dL  Any of the following can increase your risk for high cholesterol:   · Smoking cigarettes    · Being overweight or obese, or not getting enough exercise    · Drinking large amounts of alcohol    · A medical condition such as hypertension (high blood pressure) or diabetes    · Certain genes passed from your parents to you    · Age older than 65 years    What you need to know about having your cholesterol levels checked: Adults 21to 39years of age should have their cholesterol levels checked every 4 to 6 years  Adults 45 years or older should have their cholesterol checked every 1 to 2 years  You may need your cholesterol checked more often, or at a younger age, if you have risk factors for heart disease  You may also need to have your cholesterol checked more often if you have other health conditions, such as diabetes  Blood tests are used to check cholesterol levels  Blood tests measure your levels of triglycerides, LDL cholesterol, and HDL cholesterol  How healthy fats affect your cholesterol levels:  Healthy fats, also called unsaturated fats, help lower LDL cholesterol and triglyceride levels  Healthy fats include the following:  · Monounsaturated fats  are found in foods such as olive oil, canola oil, avocado, nuts, and olives  · Polyunsaturated fats,  such as omega 3 fats, are found in fish, such as salmon, trout, and tuna  They can also be found in plant foods such as flaxseed, walnuts, and soybeans  How unhealthy fats affect your cholesterol levels:  Unhealthy fats increase LDL cholesterol and triglyceride levels  They are found in foods high in cholesterol, saturated fat, and trans fat:  · Cholesterol  is found in eggs, dairy, and meat  · Saturated fat  is found in butter, cheese, ice cream, whole milk, and coconut oil  Saturated fat is also found in meat, such as sausage, hot dogs, and bologna  · Trans fat  is found in liquid oils and is used in fried and baked foods  Foods that contain trans fats include chips, crackers, muffins, sweet rolls, microwave popcorn, and cookies  Treatment  for high cholesterol will also decrease your risk of heart disease, heart attack, and stroke  Treatment may include any of the following:  · Lifestyle changes  may include food, exercise, weight loss, and quitting smoking  You may also need to decrease the amount of alcohol you drink  Your healthcare provider will want you to start with lifestyle changes  Other treatment may be added if lifestyle changes are not enough  Your healthcare provider may recommend you work with a team to manage hyperlipidemia  The team may include medical experts such as a dietitian, an exercise or physical therapist, and a behavior therapist  Your family members may be included in helping you create lifestyle changes  · Medicines  may be given to lower your LDL cholesterol, triglyceride levels, or total cholesterol level  You may need medicines to lower your cholesterol if any of the following is true:    ? You have a history of stroke, TIA, unstable angina, or a heart attack  ?  Your LDL cholesterol level is 190 mg/dL or higher  ? You are age 36 to 76 years, have diabetes or heart disease risk factors, and your LDL cholesterol is 70 mg/dL or higher  · Supplements  include fish oil, red yeast rice, and garlic  Fish oil may help lower your triglyceride and LDL cholesterol levels  It may also increase your HDL cholesterol level  Red yeast rice may help decrease your total cholesterol level and LDL cholesterol level  Garlic may help lower your total cholesterol level  Do not take any supplements without talking to your healthcare provider  Food changes you can make to lower your cholesterol levels:  A dietitian can help you create a healthy eating plan  He or she can show you how to read food labels and choose foods low in saturated fat, trans fats, and cholesterol  · Decrease the total amount of fat you eat  Choose lean meats, fat-free or 1% fat milk, and low-fat dairy products, such as yogurt and cheese  Try to limit or avoid red meats  Limit or do not eat fried foods or baked goods, such as cookies  · Replace unhealthy fats with healthy fats  Cook foods in olive oil or canola oil  Choose soft margarines that are low in saturated fat and trans fat  Seeds, nuts, and avocados are other examples of healthy fats  · Eat foods with omega-3 fats  Examples include salmon, tuna, mackerel, walnuts, and flaxseed  Eat fish 2 times per week  Pregnant women should not eat fish that have high levels of mercury, such as shark, swordfish, and brayden mackerel  · Increase the amount of high-fiber foods you eat  High-fiber foods can help lower your LDL cholesterol  Aim to get between 20 and 30 grams of fiber each day  Fruits and vegetables are high in fiber  Eat at least 5 servings each day  Other high-fiber foods are whole-grain or whole-wheat breads, pastas, or cereals, and brown rice  Eat 3 ounces of whole-grain foods each day  Increase fiber slowly   You may have abdominal discomfort, bloating, and gas if you add fiber to your diet too quickly  · Eat healthy protein foods  Examples include low-fat dairy products, skinless chicken and turkey, fish, and nuts  · Limit foods and drinks that are high in sugar  Your dietitian or healthcare provider can help you create daily limits for high-sugar foods and drinks  The limit may be lower if you have diabetes or another health condition  Limits can also help you lose weight if needed  Lifestyle changes you can make to lower your cholesterol levels:   · Maintain a healthy weight  Ask your healthcare provider what a healthy weight is for you  Ask him or her to help you create a weight loss plan if needed  Weight loss can decrease your total cholesterol and triglyceride levels  Weight loss may also help keep your blood pressure at a healthy level  · Be physically active throughout the day  Physical activity, such as exercise, can help lower your total cholesterol level and maintain a healthy weight  Physical activity can also help increase your HDL cholesterol level  Work with your healthcare provider to create an program that is right for you  Get at least 30 to 40 minutes of moderate physical activity most days of the week  Examples of exercise include brisk walking, swimming, or biking  Also include strength training at least 2 times each week  Your healthcare providers can help you create a physical activity plan  · Do not smoke  Nicotine and other chemicals in cigarettes and cigars can raise your cholesterol levels  Ask your healthcare provider for information if you currently smoke and need help to quit  E-cigarettes or smokeless tobacco still contain nicotine  Talk to your healthcare provider before you use these products  · Limit or do not drink alcohol  Alcohol can increase your triglyceride levels  Ask your healthcare provider before you drink alcohol  Ask how much is okay for you to drink in 24 hours or 1 week      Follow up with your doctor as directed:  Write down your questions so you remember to ask them during your visits  © Copyright [employer] 2022 Information is for End User's use only and may not be sold, redistributed or otherwise used for commercial purposes  All illustrations and images included in CareNotes® are the copyrighted property of [employer]  or Chelsy Vargas  The above information is an  only  It is not intended as medical advice for individual conditions or treatments  Talk to your doctor, nurse or pharmacist before following any medical regimen to see if it is safe and effective for you

## 2022-08-23 NOTE — PROGRESS NOTES
Assessment/Plan:  Problem List Items Addressed This Visit        Digestive    GERD (gastroesophageal reflux disease)     Stable s meds  Watch GERD diet  Endocrine    IFG (impaired fasting glucose)     Pending labs  Recommend lifestyle modifications  Relevant Orders    Comprehensive metabolic panel (Completed)    Hemoglobin A1C (Completed)       Musculoskeletal and Integument    Psoriasis     Stable on Triamcinolone cream PRN  Pending Derm consult  Relevant Medications    clobetasol (OLUX) 0 05 % topical foam    Other Relevant Orders    Ambulatory Referral to Dermatology    Vitamin D 25 hydroxy (Completed)    Scalp psoriasis     Scalp Psoriasis - Stable on Clobetasol foam PRN  Pending Derm consult  Relevant Medications    clobetasol (OLUX) 0 05 % topical foam    Other Relevant Orders    Ambulatory Referral to Dermatology    Vitamin D 25 hydroxy (Completed)    Osteoarthritis     Stable on Voltaren gel and pills  Other    Vitamin D deficiency     Pending labs  Relevant Orders    Vitamin D 25 hydroxy (Completed)    Generalized anxiety disorder     Stable without mood medications or counseling  Relevant Orders    CBC and differential (Completed)    Comprehensive metabolic panel (Completed)    TSH, 3rd generation with Free T4 reflex (Completed)    Other hyperlipidemia     Pending labs  Previously stable s statin  Recommend lifestyle modifications      The 10-year ASCVD risk score (Nathaly Sandoval et al , 2013) is: 1%    Values used to calculate the score:      Age: 54 years      Sex: Female      Is Non- : No      Diabetic: No      Tobacco smoker: No      Systolic Blood Pressure: 556 mmHg      Is BP treated: No      HDL Cholesterol: 53 mg/dL      Total Cholesterol: 207 mg/dL           Relevant Orders    CBC and differential (Completed)    Comprehensive metabolic panel (Completed)    Lipid panel (Completed)    TSH, 3rd generation with Free T4 reflex (Completed)    LDL cholesterol, direct (Completed)    Class 2 severe obesity with serious comorbidity and body mass index (BMI) of 39 0 to 39 9 in adult [facility])     Improved  Recommend lifestyle modifications  Patient declines weight management consult  Relevant Orders    TSH, 3rd generation with Free T4 reflex (Completed)      Other Visit Diagnoses     Annual physical exam    -  Primary    Encounter for screening mammogram for breast cancer        Relevant Orders    Mammo screening bilateral w 3d & cad    Screening for cervical cancer        Relevant Orders    Ambulatory referral to Obstetrics / Gynecology    Screening for colorectal cancer        Relevant Orders    Ambulatory referral for Colonoscopy    Screening for diabetes mellitus        Relevant Orders    Hemoglobin A1C (Completed)    Screening for cardiovascular condition        Relevant Orders    CBC and differential (Completed)    Comprehensive metabolic panel (Completed)    Lipid panel (Completed)    LDL cholesterol, direct (Completed)    Obesity (BMI 35 0-39 9 without comorbidity)        Relevant Orders    TSH, 3rd generation with Free T4 reflex (Completed)    Severe obesity (BMI 35 0-39  9) with comorbidity ([address] )        Relevant Orders    TSH, 3rd generation with Free T4 reflex (Completed)    BMI 39 0-39 9,adult        Relevant Orders    TSH, 3rd generation with Free T4 reflex (Completed)           Return in about 6 weeks (around 10/4/2022) for F/U IFG, HL, Adjust D/O, Anxiety, Vit D Def, Obesity, Labs  Future Appointments   Date Time Provider Mirna Jerome   10/4/2022  8:20 AM Ashwini Olivas DO [facility]        Subjective:     Ciera Escalona is a 54 y o  female who presents today for a follow-up on her chronic medical conditions  HPI:  Chief Complaint   Patient presents with    Physical Exam    HM     Needs referral to GYN and GI      -- Above per clinical staff and reviewed  --      HPI      Today:      Return in about 6 months (around 2022) for 6mo - IFG, HL, Adjust D/O, Anxiety, Vit D Def, M Obesity, Labs  6mo - Overdue         Obesity - Declines Weight Management - previously referred   Trying to watch diet   No regular exercise  Active at work in [employer]  IFG / H/O GDM - Diet-controlled   No meds previously         Hyperlipidemia - No statin previously        GERD / Fatty Liver -  Stable, except for late night eating   No longer uses OTC acid reducer PRN  Improved s/p Laparoscopic cholecystectomy on 2021 per Dr Odelia Meigs   Has diarrhea after eating         IFG / H/O GDM - Diet-controlled   No meds previously        OA - Knees and Ankles - Management per Podiatry  - [facility]  Next appt PRN  Stable on Voltaren gel and PO        Anxiety -  Patient states mood was stable  Good social supports   No SI/HI/AH/VH   No mood meds or counseling previously        PHQ-2/9 Depression Screening    Little interest or pleasure in doing things: 0 - not at all  Feeling down, depressed, or hopeless: 0 - not at all  Trouble falling or staying asleep, or sleeping too much: 0 - not at all  Feeling tired or having little energy: 0 - not at all  Poor appetite or overeatin - not at all  Feeling bad about yourself - or that you are a failure or have let yourself or your family down: 0 - not at all  Trouble concentrating on things, such as reading the newspaper or watching television: 0 - not at all  Moving or speaking so slowly that other people could have noticed   Or the opposite - being so fidgety or restless that you have been moving around a lot more than usual: 0 - not at all  Thoughts that you would be better off dead, or of hurting yourself in some way: 0 - not at all  PHQ-2 Score: 0  PHQ-2 Interpretation: Negative depression screen  PHQ-9 Score: 0   PHQ-9 Interpretation: No or Minimal depression          GRACIELA-7 Flowsheet Screening    Flowsheet Row Most Recent Value   Over the last 2 weeks, how often have you been bothered by any of the following problems? Feeling nervous, anxious, or on edge 0   Not being able to stop or control worrying 0   Worrying too much about different things 0   Trouble relaxing 0   Being so restless that it is hard to sit still 0   Becoming easily annoyed or irritable 0   Feeling afraid as if something awful might happen 0   GRACIELA-7 Total Score 0           Vitamin D Deficiency - s/p Drisdol   No MVI and OTC Vitamin C currently         +HANH - No Rheum consult previously  Negative repeat HANH 6/4/21        Psoriasis - Management per PCP   Has not seen Derm in years   Behind ears and B/L nares   Stable on Triamcinolone cream PRN        Scalp Psoriasis - Management per PCP   Has not seen Derm in years   Stable on Clobetasol foam PRN  Worse c sweating        Microscopic Hematuria - No Uro evaluation previously  Stable U/A 6/4/21         Reviewed:  Labs 6/4/21     No Gyn   Last saw Gyn years ago  Maranda Moritz abnormal Paps in the past       No CRC screening previously        Sees Dentist q6 months  Sees Optho q2 years  The following portions of the patient's history were reviewed and updated as appropriate: allergies, current medications, past family history, past medical history, past social history, past surgical history and problem list       Review of Systems   Constitutional: Negative for appetite change, chills, diaphoresis, fatigue and fever  Respiratory: Negative for chest tightness and shortness of breath  Cardiovascular: Negative for chest pain  Gastrointestinal: Negative for abdominal pain, blood in stool, diarrhea, nausea and vomiting  Genitourinary: Negative for dysuria  Musculoskeletal: Positive for arthralgias          Current Outpatient Medications   Medication Sig Dispense Refill    clobetasol (OLUX) 0 05 % topical foam Apply 1 application topically 2 (two) times a day as needed (Scalp Psoriasis) For Scalp Psoriasis  100 g 0    diclofenac (VOLTAREN) 75 mg EC tablet Take 1 tablet (75 mg total) by mouth 2 (two) times a day 60 tablet 0    Diclofenac Sodium (VOLTAREN) 1 % Apply 2 g topically 4 (four) times a day 150 g 0    triamcinolone (KENALOG) 0 1 % cream Apply 1 application topically 2 (two) times a day as needed        No current facility-administered medications for this visit  Objective:  /72   Pulse 63   Temp (!) 97 °F (36 1 °C)   Ht 5' 4" (1 626 m)   Wt 104 kg (230 lb)   SpO2 99%   BMI 39 48 kg/m²    Wt Readings from Last 3 Encounters:   08/23/22 104 kg (230 lb)   06/28/22 109 kg (240 lb)   07/21/21 109 kg (240 lb)      BP Readings from Last 3 Encounters:   08/23/22 116/72   08/12/22 128/61   06/28/22 129/82          Physical Exam  Vitals and nursing note reviewed  Constitutional:       Appearance: Normal appearance  She is well-developed  She is obese  HENT:      Head: Normocephalic and atraumatic  Right Ear: Tympanic membrane, ear canal and external ear normal       Left Ear: Tympanic membrane, ear canal and external ear normal       Nose: Nose normal       Right Sinus: No maxillary sinus tenderness or frontal sinus tenderness  Left Sinus: No maxillary sinus tenderness or frontal sinus tenderness  Mouth/Throat:      Mouth: Mucous membranes are moist       Pharynx: Oropharynx is clear  Uvula midline  Tonsils: No tonsillar exudate  Eyes:      Extraocular Movements: Extraocular movements intact  Conjunctiva/sclera: Conjunctivae normal       Pupils: Pupils are equal, round, and reactive to light  Cardiovascular:      Rate and Rhythm: Normal rate and regular rhythm  Pulses: Normal pulses  Heart sounds: Normal heart sounds  Pulmonary:      Effort: Pulmonary effort is normal       Breath sounds: Normal breath sounds  Abdominal:      General: Bowel sounds are normal  There is no distension  Palpations: Abdomen is soft  There is no mass  Tenderness: There is no abdominal tenderness  There is no guarding or rebound  Musculoskeletal:         General: No swelling or tenderness  Cervical back: Neck supple  Right lower leg: No edema  Left lower leg: No edema  Lymphadenopathy:      Cervical: No cervical adenopathy  Skin:     Findings: No rash  Neurological:      General: No focal deficit present  Mental Status: She is alert and oriented to person, place, and time  Psychiatric:         Mood and Affect: Mood normal          Behavior: Behavior normal          Thought Content: Thought content normal          Judgment: Judgment normal          Lab Results:      Lab Results   Component Value Date    WBC 6 47 08/23/2022    HGB 11 8 08/23/2022    HCT 36 1 08/23/2022     08/23/2022    TRIG 109 08/23/2022    HDL 53 08/23/2022    LDLDIRECT 130 (H) 08/23/2022    ALT 20 08/23/2022    AST 19 08/23/2022    K 4 6 08/23/2022     08/23/2022    CREATININE 0 77 08/23/2022    BUN 11 08/23/2022    CO2 26 08/23/2022    GLUF 85 08/23/2022    HGBA1C 5 9 (H) 08/23/2022     Lab Results   Component Value Date    URICACID 5 9 06/04/2021     Invalid input(s): BASENAME Vitamin D    No results found  POCT Labs      BMI Counseling: Body mass index is 39 48 kg/m²  The BMI is above normal  Nutrition recommendations include encouraging healthy choices of fruits and vegetables  Exercise recommendations include exercising 3-5 times per week  No pharmacotherapy was ordered  Rationale for BMI follow-up plan is due to patient being overweight or obese  Depression Screening and Follow-up Plan: Patient was screened for depression during today's encounter  They screened negative with a PHQ-2 score of 0

## 2022-08-24 NOTE — PROGRESS NOTES
Assessment/Plan:  Problem List Items Addressed This Visit        Digestive    GERD (gastroesophageal reflux disease)     Stable s meds  Watch GERD diet  Endocrine    IFG (impaired fasting glucose)     Pending labs  Recommend lifestyle modifications  Relevant Orders    Comprehensive metabolic panel (Completed)    Hemoglobin A1C (Completed)       Musculoskeletal and Integument    Psoriasis     Stable on Triamcinolone cream PRN  Pending Derm consult  Relevant Medications    clobetasol (OLUX) 0 05 % topical foam    Other Relevant Orders    Ambulatory Referral to Dermatology    Vitamin D 25 hydroxy (Completed)    Scalp psoriasis     Scalp Psoriasis - Stable on Clobetasol foam PRN  Pending Derm consult  Relevant Medications    clobetasol (OLUX) 0 05 % topical foam    Other Relevant Orders    Ambulatory Referral to Dermatology    Vitamin D 25 hydroxy (Completed)    Osteoarthritis     Stable on Voltaren gel and pills  Other    Vitamin D deficiency     Pending labs  Relevant Orders    Vitamin D 25 hydroxy (Completed)    Generalized anxiety disorder     Stable without mood medications or counseling  Relevant Orders    CBC and differential (Completed)    Comprehensive metabolic panel (Completed)    TSH, 3rd generation with Free T4 reflex (Completed)    Other hyperlipidemia     Pending labs  Previously stable s statin  Recommend lifestyle modifications      The 10-year ASCVD risk score (Satnam Haskins et al , 2013) is: 1%    Values used to calculate the score:      Age: 54 years      Sex: Female      Is Non- : No      Diabetic: No      Tobacco smoker: No      Systolic Blood Pressure: 499 mmHg      Is BP treated: No      HDL Cholesterol: 53 mg/dL      Total Cholesterol: 207 mg/dL           Relevant Orders    CBC and differential (Completed)    Comprehensive metabolic panel (Completed)    Lipid panel (Completed)    TSH, 3rd generation with Free T4 reflex (Completed)    LDL cholesterol, direct (Completed)    Class 2 severe obesity with serious comorbidity and body mass index (BMI) of 39 0 to 39 9 in adult [facility])     Improved  Recommend lifestyle modifications  Patient declines weight management consult  Relevant Orders    TSH, 3rd generation with Free T4 reflex (Completed)      Other Visit Diagnoses     Annual physical exam    -  Primary    Encounter for screening mammogram for breast cancer        Relevant Orders    Mammo screening bilateral w 3d & cad    Screening for cervical cancer        Relevant Orders    Ambulatory referral to Obstetrics / Gynecology    Screening for colorectal cancer        Relevant Orders    Ambulatory referral for Colonoscopy    Screening for diabetes mellitus        Relevant Orders    Hemoglobin A1C (Completed)    Screening for cardiovascular condition        Relevant Orders    CBC and differential (Completed)    Comprehensive metabolic panel (Completed)    Lipid panel (Completed)    LDL cholesterol, direct (Completed)    Obesity (BMI 35 0-39 9 without comorbidity)        Relevant Orders    TSH, 3rd generation with Free T4 reflex (Completed)    Severe obesity (BMI 35 0-39  9) with comorbidity ([address] )        Relevant Orders    TSH, 3rd generation with Free T4 reflex (Completed)    BMI 39 0-39 9,adult        Relevant Orders    TSH, 3rd generation with Free T4 reflex (Completed)           Return in about 6 weeks (around 10/4/2022) for F/U IFG, HL, Adjust D/O, Anxiety, Vit D Def, Obesity, Labs  Future Appointments   Date Time Provider Mirna Jerome   10/4/2022  8:20 AM Ty Cam, DO [facility]        Subjective:     Melvin Cruz is a 54 y o  female who presents today for a follow-up on her chronic medical conditions  HPI:  Chief Complaint   Patient presents with    Physical Exam    HM     Needs referral to GYN and GI      -- Above per clinical staff and reviewed  --      HPI      Today:      Physical    Watching diet  No exercise  Shaina Lorenzana saw Gyn years ago   No abnormal Paps in the past        No CRC screening previously        Sees Dentist q6 months  Sees Optho q2 years          Health Maintenance   Topic Date Due    Cervical Cancer Screening  Never done    Breast Cancer Screening: Mammogram  Never done    Colorectal Cancer Screening  Never done    Influenza Vaccine (1) 09/01/2022    COVID-19 Vaccine (2 - Booster for Euna Bristol series) 11/23/2022 (Originally 11/24/2021)    Depression Screening  08/23/2023    BMI: Followup Plan  08/23/2023    BMI: Adult  08/23/2023    Annual Physical  08/23/2023    DTaP,Tdap,and Td Vaccines (2 - Td or Tdap) 04/03/2024    HIV Screening  Completed    Hepatitis C Screening  Completed    Pneumococcal Vaccine: Pediatrics (0 to 5 Years) and At-Risk Patients (6 to 59 Years)  Aged Out    HIB Vaccine  Aged Out    Hepatitis B Vaccine  Aged Out    IPV Vaccine  Aged Out    Hepatitis A Vaccine  Aged Out    Meningococcal ACWY Vaccine  Aged Out    HPV Vaccine  Aged Out         The following portions of the patient's history were reviewed and updated as appropriate: allergies, current medications, past family history, past medical history, past social history, past surgical history and problem list       Review of Systems     See other note  Current Outpatient Medications   Medication Sig Dispense Refill    clobetasol (OLUX) 0 05 % topical foam Apply 1 application topically 2 (two) times a day as needed (Scalp Psoriasis) For Scalp Psoriasis  100 g 0    diclofenac (VOLTAREN) 75 mg EC tablet Take 1 tablet (75 mg total) by mouth 2 (two) times a day 60 tablet 0    Diclofenac Sodium (VOLTAREN) 1 % Apply 2 g topically 4 (four) times a day 150 g 0    triamcinolone (KENALOG) 0 1 % cream Apply 1 application topically 2 (two) times a day as needed        No current facility-administered medications for this visit  Objective:  /72   Pulse 63   Temp (!) 97 °F (36 1 °C)   Ht 5' 4" (1 626 m)   Wt 104 kg (230 lb)   SpO2 99%   BMI 39 48 kg/m²    Wt Readings from Last 3 Encounters:   08/23/22 104 kg (230 lb)   06/28/22 109 kg (240 lb)   07/21/21 109 kg (240 lb)      BP Readings from Last 3 Encounters:   08/23/22 116/72   08/12/22 128/61   06/28/22 129/82          Physical Exam     Vitals and nursing note reviewed  Constitutional:       Appearance: Normal appearance  She is well-developed  She is obese  HENT:      Head: Normocephalic and atraumatic  Right Ear: Tympanic membrane, ear canal and external ear normal       Left Ear: Tympanic membrane, ear canal and external ear normal       Nose: Nose normal       Right Sinus: No maxillary sinus tenderness or frontal sinus tenderness  Left Sinus: No maxillary sinus tenderness or frontal sinus tenderness  Mouth/Throat:      Mouth: Mucous membranes are moist       Pharynx: Oropharynx is clear  Uvula midline  Tonsils: No tonsillar exudate  Eyes:      Extraocular Movements: Extraocular movements intact  Conjunctiva/sclera: Conjunctivae normal       Pupils: Pupils are equal, round, and reactive to light  Cardiovascular:      Rate and Rhythm: Normal rate and regular rhythm  Pulses: Normal pulses  Heart sounds: Normal heart sounds  Pulmonary:      Effort: Pulmonary effort is normal       Breath sounds: Normal breath sounds  Abdominal:      General: Bowel sounds are normal  There is no distension  Palpations: Abdomen is soft  There is no mass  Tenderness: There is no abdominal tenderness  There is no guarding or rebound  Musculoskeletal:         General: No swelling or tenderness  Cervical back: Neck supple  Right lower leg: No edema  Left lower leg: No edema  Lymphadenopathy:      Cervical: No cervical adenopathy  Skin:     Findings: No rash  Neurological:      General: No focal deficit present  Mental Status: She is alert and oriented to person, place, and time  Psychiatric:         Mood and Affect: Mood normal          Behavior: Behavior normal          Thought Content: Thought content normal          Judgment: Judgment normal      Lab Results:      Lab Results   Component Value Date    WBC 6 47 08/23/2022    HGB 11 8 08/23/2022    HCT 36 1 08/23/2022     08/23/2022    TRIG 109 08/23/2022    HDL 53 08/23/2022    LDLDIRECT 130 (H) 08/23/2022    ALT 20 08/23/2022    AST 19 08/23/2022    K 4 6 08/23/2022     08/23/2022    CREATININE 0 77 08/23/2022    BUN 11 08/23/2022    CO2 26 08/23/2022    GLUF 85 08/23/2022    HGBA1C 5 9 (H) 08/23/2022     Lab Results   Component Value Date    URICACID 5 9 06/04/2021     Invalid input(s): BASENAME Vitamin D    No results found       POCT Labs

## 2022-08-24 NOTE — ASSESSMENT & PLAN NOTE
Pending labs  Previously stable s statin  Recommend lifestyle modifications      The 10-year ASCVD risk score (Gayla Hunt et al , 2013) is: 1%    Values used to calculate the score:      Age: 54 years      Sex: Female      Is Non- : No      Diabetic: No      Tobacco smoker: No      Systolic Blood Pressure: 765 mmHg      Is BP treated: No      HDL Cholesterol: 53 mg/dL      Total Cholesterol: 207 mg/dL

## 2022-09-28 ENCOUNTER — OFFICE VISIT (OUTPATIENT)
Dept: OBGYN CLINIC | Facility: CLINIC | Age: 52
End: 2022-09-28
Payer: COMMERCIAL

## 2022-09-28 VITALS
HEART RATE: 67 BPM | BODY MASS INDEX: 39.27 KG/M2 | DIASTOLIC BLOOD PRESSURE: 75 MMHG | WEIGHT: 230 LBS | HEIGHT: 64 IN | SYSTOLIC BLOOD PRESSURE: 115 MMHG

## 2022-09-28 DIAGNOSIS — S93.492D SPRAIN OF ANTERIOR TALOFIBULAR LIGAMENT OF LEFT ANKLE, SUBSEQUENT ENCOUNTER: Primary | ICD-10-CM

## 2022-09-28 PROCEDURE — 99213 OFFICE O/P EST LOW 20 MIN: CPT | Performed by: ORTHOPAEDIC SURGERY

## 2022-09-28 NOTE — PROGRESS NOTES
TEODORO Sanchez  Attending, Orthopaedic Surgery  Foot and [address] [facility] [address] [facility]      ORTHOPAEDIC FOOT AND ANKLE CLINIC VISIT     Assessment:     Encounter Diagnosis   Name Primary?  Sprain of anterior talofibular ligament of left ankle, subsequent encounter Yes            Plan:   · The patient verbalized understanding of exam findings and treatment plan  We engaged in the shared decision-making process and treatment options were discussed at length with the patient  Surgical and conservative management discussed today along with risks and benefits  · She had significant improvements in her symptoms on Diclofenac PO but never participated in PT  My concern is, once she stops this medication the pain/symptoms may return  · It is recommended, as it was at her last visit, that she perform PT/HEP for generalized weakness about the ankle for a long term solution to her problem  · May perform activities as tolerated with modifications to avoid pain  Return if symptoms worsen or fail to improve  History of Present Illness:   Chief Complaint:   Chief Complaint   Patient presents with    Left Ankle - Follow-up     Jillian Miller is a 50 y o  female who is being seen in follow-up for left ankle sprain  When we last saw she we recommended PT/HEP, which patient states she has not performed due to work duties  Pain has improved with the use of Diclofenac  Residual pain is localized at anterolateral ankle and midfoot with minimal radiating and described as sharp and severe        Pain/symptom timing:  Worse during the day when active  Pain/symptom context:  Worse with activites and work  Pain/symptom modifying factors:  Rest makes better, activities make worse  Pain/symptom associated signs/symptoms: none    Prior treatment   · NSAIDsYes   · Injections No   · Bracing/Orthotics No    · Physical Therapy No     Orthopedic Surgical History:   See below    Past Medical, Surgical and Social History:  Past Medical History:  has a past medical history of Arthritis, Class 2 severe obesity with serious comorbidity and body mass index (BMI) of 39 0 to 39 9 in adult [facility]), COVID-19 (2020), Diabetes mellitus ([facility] [address] ), Generalized anxiety disorder (2015), GERD (gastroesophageal reflux disease), History of gestational diabetes (, ), IFG (impaired fasting glucose), Obesity, Osteoarthritis (), Other hyperlipidemia (6/3/2021), Psoriasis (2015), Scalp psoriasis (2021), and Vitamin D deficiency (2019)  Problem List: does not have any pertinent problems on file  Past Surgical History:  has a past surgical history that includes  section ( & ); ARTHROSCOPY ANKLE (Right, ); Tonsillectomy; pr lap,cholecystectomy (N/A, 2021); and Cholecystectomy (2021)  Family History: family history includes Coronary artery disease (age of onset: 32) in her father; Diabetes in her maternal grandmother, paternal grandfather, and paternal grandmother; Diabetes (age of onset: 36) in her father; Heart attack (age of onset: 32) in her father; Heart defect in her mother; Heart disease in her father, mother, paternal grandfather, and paternal grandmother; Kidney disease in her maternal grandmother; Muscular dystrophy (age of onset: 32) in her mother; No Known Problems in her brother, maternal grandfather, son, and son  Social History:  reports that she quit smoking about 12 years ago  Her smoking use included cigarettes  She started smoking about 36 years ago  She has a 10 00 pack-year smoking history  She has never used smokeless tobacco  She reports current alcohol use of about 1 0 standard drink of alcohol per week  She reports previous drug use  Drug: Marijuana  Current Medications: has a current medication list which includes the following prescription(s): clobetasol, diclofenac, diclofenac sodium, and triamcinolone  Allergies: has No Known Allergies       Review of Systems:  General- denies fever/chills  HEENT- denies hearing loss or sore throat  Eyes- denies eye pain or visual disturbances, denies red eyes  Respiratory- denies cough or SOB  Cardio- denies chest pain or palpitations  GI- denies abdominal pain  Endocrine- denies urinary frequency  Urinary- denies pain with urination  Musculoskeletal- Negative except noted above  Skin- denies rashes or wounds  Neurological- denies dizziness or headache  Psychiatric- denies anxiety or difficulty concentrating    Physical Exam:   /75 (BP Location: Right arm, Patient Position: Sitting, Cuff Size: Adult)   Pulse 67   Ht 5' 4" (1 626 m)   Wt 104 kg (230 lb)   BMI 39 48 kg/m²   General/Constitutional: No apparent distress: well-nourished and well developed  Eyes: normal ocular motion  Lymphatic: No appreciable lymphadenopathy  Respiratory: Non-labored breathing  Vascular: No edema, swelling or tenderness, except as noted in detailed exam   Integumentary: No impressive skin lesions present, except as noted in detailed exam   Neuro: No ataxia or tremors noted  Psych: Normal mood and affect, oriented to person, place and time  Appropriate affect  Musculoskeletal: Normal, except as noted in detailed exam and in HPI  Examination    Left    Gait Normal   Musculoskeletal Tender to palpation at ATFL    Skin Normal     Nails Normal    Range of Motion  20 degrees dorsiflexion, 40 degrees plantarflexion  Subtalar motion: normal    Stability Stable    Muscle Strength 4/5 tibialis anterior  5/5 gastrocnemius-soleus  5/5 posterior tibialis  5/5 peroneal/eversion strength  5/5 EHL  5/5 FHL    Neurologic Normal    Sensation Intact to light touch throughout sural, saphenous, superficial peroneal, deep peroneal and medial/lateral plantar nerve distributions  [place]-Maximo 5 07 filament (10g) testing deferred      Cardiovascular Brisk capillary refill < 2 seconds,intact DP and PT pulses    Special Tests Negative anterior drawer Imaging Studies:   No new imaging      Javan Epps Lachman, MD  Foot & Ankle Surgery   Department of [address] [place] [address]      I personally performed the service  Javan Epps Lachman, MD    Scribe Attestation    I,:  Prashanth Vasquez am acting as a scribe while in the presence of the attending physician :       I,:  Malcolm Masterson MD personally performed the services described in this documentation    as scribed in my presence :

## 2022-09-29 ENCOUNTER — TELEPHONE (OUTPATIENT)
Dept: GASTROENTEROLOGY | Facility: AMBULARY SURGERY CENTER | Age: 52
End: 2022-09-29

## 2022-09-29 ENCOUNTER — PREP FOR PROCEDURE (OUTPATIENT)
Dept: GASTROENTEROLOGY | Facility: AMBULARY SURGERY CENTER | Age: 52
End: 2022-09-29

## 2022-09-29 DIAGNOSIS — Z12.11 SPECIAL SCREENING FOR MALIGNANT NEOPLASMS, COLON: Primary | ICD-10-CM

## 2022-09-29 NOTE — TELEPHONE ENCOUNTER
Patient is scheduled for colonoscopy on November 4 , 2022 at [facility] with [address]  Braydon Sandoval MD   Patient is aware of pre-procedure prep of Miralax/Dulcolax and they will be called the day prior between 2 and 6 pm for time to report for procedure  Pre-procedure prep has been given to the patient  via [facility] mail and e-mail on September 29 , 2022

## 2022-11-03 RX ORDER — SODIUM CHLORIDE, SODIUM LACTATE, POTASSIUM CHLORIDE, CALCIUM CHLORIDE 600; 310; 30; 20 MG/100ML; MG/100ML; MG/100ML; MG/100ML
125 INJECTION, SOLUTION INTRAVENOUS CONTINUOUS
Status: CANCELLED | OUTPATIENT
Start: 2022-11-03

## 2022-11-03 RX ORDER — LIDOCAINE HYDROCHLORIDE 10 MG/ML
0.5 INJECTION, SOLUTION EPIDURAL; INFILTRATION; INTRACAUDAL; PERINEURAL ONCE AS NEEDED
Status: CANCELLED | OUTPATIENT
Start: 2022-11-03

## 2022-11-04 ENCOUNTER — HOSPITAL ENCOUNTER (OUTPATIENT)
Dept: GASTROENTEROLOGY | Facility: HOSPITAL | Age: 52
Setting detail: OUTPATIENT SURGERY
End: 2022-11-04
Attending: INTERNAL MEDICINE

## 2022-11-04 ENCOUNTER — ANESTHESIA (OUTPATIENT)
Dept: GASTROENTEROLOGY | Facility: HOSPITAL | Age: 52
End: 2022-11-04

## 2022-11-04 ENCOUNTER — ANESTHESIA EVENT (OUTPATIENT)
Dept: GASTROENTEROLOGY | Facility: HOSPITAL | Age: 52
End: 2022-11-04

## 2022-11-04 VITALS
WEIGHT: 221.9 LBS | TEMPERATURE: 98.4 F | OXYGEN SATURATION: 99 % | RESPIRATION RATE: 17 BRPM | BODY MASS INDEX: 36.97 KG/M2 | SYSTOLIC BLOOD PRESSURE: 121 MMHG | HEIGHT: 65 IN | HEART RATE: 59 BPM | DIASTOLIC BLOOD PRESSURE: 74 MMHG

## 2022-11-04 DIAGNOSIS — Z12.11 SPECIAL SCREENING FOR MALIGNANT NEOPLASMS, COLON: ICD-10-CM

## 2022-11-04 LAB
EXT PREGNANCY TEST URINE: NEGATIVE
EXT. CONTROL: NORMAL

## 2022-11-04 RX ORDER — SODIUM CHLORIDE 9 MG/ML
20 INJECTION, SOLUTION INTRAVENOUS CONTINUOUS
Status: DISCONTINUED | OUTPATIENT
Start: 2022-11-04 | End: 2022-11-08 | Stop reason: HOSPADM

## 2022-11-04 RX ORDER — SODIUM CHLORIDE, SODIUM LACTATE, POTASSIUM CHLORIDE, CALCIUM CHLORIDE 600; 310; 30; 20 MG/100ML; MG/100ML; MG/100ML; MG/100ML
INJECTION, SOLUTION INTRAVENOUS CONTINUOUS PRN
Status: DISCONTINUED | OUTPATIENT
Start: 2022-11-04 | End: 2022-11-04

## 2022-11-04 RX ORDER — LIDOCAINE HYDROCHLORIDE 10 MG/ML
0.5 INJECTION, SOLUTION EPIDURAL; INFILTRATION; INTRACAUDAL; PERINEURAL ONCE AS NEEDED
Status: DISCONTINUED | OUTPATIENT
Start: 2022-11-04 | End: 2022-11-08 | Stop reason: HOSPADM

## 2022-11-04 RX ORDER — PROPOFOL 10 MG/ML
INJECTION, EMULSION INTRAVENOUS AS NEEDED
Status: DISCONTINUED | OUTPATIENT
Start: 2022-11-04 | End: 2022-11-04

## 2022-11-04 RX ORDER — SODIUM CHLORIDE, SODIUM LACTATE, POTASSIUM CHLORIDE, CALCIUM CHLORIDE 600; 310; 30; 20 MG/100ML; MG/100ML; MG/100ML; MG/100ML
125 INJECTION, SOLUTION INTRAVENOUS CONTINUOUS
Status: DISCONTINUED | OUTPATIENT
Start: 2022-11-04 | End: 2022-11-08 | Stop reason: HOSPADM

## 2022-11-04 RX ADMIN — PROPOFOL 50 MG: 10 INJECTION, EMULSION INTRAVENOUS at 08:56

## 2022-11-04 RX ADMIN — PROPOFOL 100 MG: 10 INJECTION, EMULSION INTRAVENOUS at 08:51

## 2022-11-04 RX ADMIN — PROPOFOL 40 MG: 10 INJECTION, EMULSION INTRAVENOUS at 09:02

## 2022-11-04 RX ADMIN — SODIUM CHLORIDE, SODIUM LACTATE, POTASSIUM CHLORIDE, AND CALCIUM CHLORIDE: .6; .31; .03; .02 INJECTION, SOLUTION INTRAVENOUS at 08:47

## 2022-11-04 RX ADMIN — PROPOFOL 50 MG: 10 INJECTION, EMULSION INTRAVENOUS at 08:58

## 2022-11-04 RX ADMIN — PROPOFOL 20 MG: 10 INJECTION, EMULSION INTRAVENOUS at 08:53

## 2022-11-04 RX ADMIN — PROPOFOL 20 MG: 10 INJECTION, EMULSION INTRAVENOUS at 09:07

## 2022-11-04 RX ADMIN — PROPOFOL 30 MG: 10 INJECTION, EMULSION INTRAVENOUS at 08:54

## 2022-11-04 RX ADMIN — PROPOFOL 50 MG: 10 INJECTION, EMULSION INTRAVENOUS at 08:55

## 2022-11-04 RX ADMIN — PROPOFOL 20 MG: 10 INJECTION, EMULSION INTRAVENOUS at 09:04

## 2022-11-04 NOTE — ANESTHESIA POSTPROCEDURE EVALUATION
Post-Op Assessment Note    CV Status:  Stable  Pain Score: 0    Pain management: adequate     Mental Status:  Awake and sleepy   Hydration Status:  Stable   PONV Controlled:  None   Airway Patency:  Patent      Post Op Vitals Reviewed: Yes      Staff: Anesthesiologist         No complications documented      BP   109/70   Temp 97   Pulse 70   Resp 16   SpO2 96

## 2022-11-04 NOTE — ANESTHESIA PREPROCEDURE EVALUATION
Procedure:  COLONOSCOPY    Relevant Problems   CARDIO   (+) Other hyperlipidemia      GI/HEPATIC   (+) GERD (gastroesophageal reflux disease)      MUSCULOSKELETAL   (+) Osteoarthritis      NEURO/PSYCH   (+) Generalized anxiety disorder        Physical Exam    Airway    Mallampati score: III  TM Distance: >3 FB  Neck ROM: full     Dental   No notable dental hx     Cardiovascular  Rhythm: regular, Rate: normal, Cardiovascular exam normal    Pulmonary  Pulmonary exam normal Breath sounds clear to auscultation,     Other Findings        Anesthesia Plan  ASA Score- 2     Anesthesia Type- IV sedation with anesthesia with ASA Monitors  Additional Monitors:   Airway Plan:     Comment: Discussed risks/benefits, including medication reactions, awareness, aspiration, and serious/life threatening complications  Plan to maintain native airway with IVGA, monitored with EtCO2  Plan Factors-Exercise tolerance (METS): >4 METS  Patient summary reviewed  Patient instructed to abstain from smoking on day of procedure  Patient did not smoke on day of surgery  Induction- intravenous  Postoperative Plan-     Informed Consent- Anesthetic plan and risks discussed with patient  I personally reviewed this patient with the CRNA  Discussed and agreed on the Anesthesia Plan with the CRNA  Babita Peña

## 2022-11-04 NOTE — H&P
History and Physical - [facility]  Vilma Yap 50 y o  female MRN: [id number]                  HPI: Vilma Yap is a 50y o  year old female who presents for colon cancer screening      REVIEW OF SYSTEMS: Per the HPI, and otherwise unremarkable      Historical Information   Past Medical History:   Diagnosis Date   • Arthritis    • Class 2 severe obesity with serious comorbidity and body mass index (BMI) of 39 0 to 39 9 in adult [facility])    • COVID-19 2020   • Diabetes mellitus ([facility] [address] )     gestational   • Generalized anxiety disorder 2015   • GERD (gastroesophageal reflux disease)    • History of gestational diabetes ,    • IFG (impaired fasting glucose)    • Obesity    • Osteoarthritis     Osteoarthritis (Ankles & Knees)   • Other hyperlipidemia 6/3/2021   • Psoriasis 2015   • Scalp psoriasis 2021   • Vitamin D deficiency 2019     Past Surgical History:   Procedure Laterality Date   • ARTHROSCOPY ANKLE Right 2016    OA   • [address]    • CHOLECYSTECTOMY  2021   • MT LAP,CHOLECYSTECTOMY N/A 2021    Procedure: LAPAROSCOPIC POSSIBLE OPEN CHOLECYSTECTOMY;  Surgeon: Jozef Swann MD;  Location:  MAIN OR;  Service: General   • TONSILLECTOMY       Social History   Social History     Substance and Sexual Activity   Alcohol Use Yes   • Alcohol/week: 1 0 standard drink   • Types: 1 Glasses of wine per week    Comment: Very seldomly     Social History     Substance and Sexual Activity   Drug Use Not Currently   • Types: Marijuana    Comment: As a teenager     Social History     Tobacco Use   Smoking Status Former Smoker   • Packs/day: 0 50   • Years: 20 00   • Pack years: 10 00   • Types: Cigarettes   • Start date: 1986   • Quit date: 2010   • Years since quittin 8   Smokeless Tobacco Never Used     Family History   Problem Relation Age of Onset   • Diabetes Father 36        Type Unknown   • Heart attack Father 32   • Coronary artery disease Father 32   • Heart disease Father    • Heart defect Mother         R to L Shunt   • Muscular dystrophy Mother 32   • Heart disease Mother    • No Known Problems Brother    • No Known Problems Son    • Diabetes Maternal Grandmother    • Kidney disease Maternal Grandmother    • No Known Problems Maternal Grandfather    • Diabetes Paternal Grandmother    • Heart disease Paternal Grandmother    • Diabetes Paternal Grandfather    • Heart disease Paternal Grandfather    • No Known Problems Son        Meds/Allergies       Current Outpatient Medications:   •  clobetasol (OLUX) 0 05 % topical foam  •  Diclofenac Sodium (VOLTAREN) 1 %  •  triamcinolone (KENALOG) 0 1 % cream  •  diclofenac (VOLTAREN) 75 mg EC tablet    Current Facility-Administered Medications:   •  lactated ringers infusion, 125 mL/hr, Intravenous, Continuous  •  lidocaine (PF) (XYLOCAINE-MPF) 1 % injection 0 5 mL, 0 5 mL, Infiltration, Once PRN    Facility-Administered Medications Ordered in Other Encounters:   •  lactated ringers infusion, , Intravenous, Continuous PRN, New Bag at 11/04/22 0847    No Known Allergies    Objective     /59   Pulse 69   Temp 97 7 °F (36 5 °C) (Temporal)   Resp 20   Ht 5' 5" (1 651 m)   Wt 101 kg (221 lb 14 4 oz)   LMP 10/21/2022 (Approximate)   SpO2 97%   Breastfeeding No   BMI 36 93 kg/m²       PHYSICAL EXAM    Gen: NAD  Head: NCAT  CV: RRR  CHEST: Clear  ABD: soft, NT/ND  EXT: no edema      ASSESSMENT/PLAN:  This is a 50y o  year old female here for colonoscopy, and she is stable and optimized for her procedure

## 2022-11-25 ENCOUNTER — OFFICE VISIT (OUTPATIENT)
Dept: FAMILY MEDICINE CLINIC | Facility: CLINIC | Age: 52
End: 2022-11-25

## 2022-11-25 VITALS
RESPIRATION RATE: 16 BRPM | TEMPERATURE: 98 F | WEIGHT: 229.9 LBS | SYSTOLIC BLOOD PRESSURE: 108 MMHG | HEART RATE: 66 BPM | OXYGEN SATURATION: 99 % | HEIGHT: 65 IN | BODY MASS INDEX: 38.3 KG/M2 | DIASTOLIC BLOOD PRESSURE: 66 MMHG

## 2022-11-25 DIAGNOSIS — E66.01 CLASS 2 SEVERE OBESITY WITH SERIOUS COMORBIDITY AND BODY MASS INDEX (BMI) OF 38.0 TO 38.9 IN ADULT, UNSPECIFIED OBESITY TYPE (HCC): ICD-10-CM

## 2022-11-25 DIAGNOSIS — F41.1 GENERALIZED ANXIETY DISORDER: ICD-10-CM

## 2022-11-25 DIAGNOSIS — E78.49 OTHER HYPERLIPIDEMIA: ICD-10-CM

## 2022-11-25 DIAGNOSIS — L40.9 PSORIASIS: ICD-10-CM

## 2022-11-25 DIAGNOSIS — R73.01 IFG (IMPAIRED FASTING GLUCOSE): Primary | ICD-10-CM

## 2022-11-25 DIAGNOSIS — E55.9 VITAMIN D DEFICIENCY: ICD-10-CM

## 2022-11-25 DIAGNOSIS — K21.9 GASTROESOPHAGEAL REFLUX DISEASE, UNSPECIFIED WHETHER ESOPHAGITIS PRESENT: ICD-10-CM

## 2022-11-25 DIAGNOSIS — L40.9 SCALP PSORIASIS: ICD-10-CM

## 2022-11-25 DIAGNOSIS — B36.0 TINEA VERSICOLOR: ICD-10-CM

## 2022-11-25 RX ORDER — METFORMIN HYDROCHLORIDE 500 MG/1
1500 TABLET, EXTENDED RELEASE ORAL
Qty: 90 TABLET | Refills: 1 | Status: SHIPPED | OUTPATIENT
Start: 2022-11-25

## 2022-11-25 RX ORDER — SELENIUM SULFIDE 2.5 MG/100ML
LOTION TOPICAL
Qty: 118 ML | Refills: 0 | Status: SHIPPED | OUTPATIENT
Start: 2022-11-25 | End: 2022-12-02

## 2022-11-25 NOTE — PATIENT INSTRUCTIONS
Low vitamin D - Recommend start multivitamin and over-the-counter vitamin D3 1000 - 3000 International Units daily  Increase Metformin XR 500mg every 3 days stomach tolerates:  1 pill in AM; 2 pills in AM; 3 pills in AM           Tinea Versicolor   AMBULATORY CARE:   Tinea versicolor  is a long-term infection that leaves colored spots on your skin  Tinea versicolor is caused by a fungus that is normally present on your skin  This infection is not harmful  Common signs and symptoms include the following: You may not have any symptoms until you see spots on your skin  You may have many oval, patchy spots on your chest, back, arms, or face  They may be white, pink, red, or brown  The spots may be close together and cover a large area  They may be lighter than the rest of your skin in summer and darker in winter  The spots may itch  Contact your healthcare provider if:   Your infection does not get better within 2 weeks of treatment  Your signs and symptoms get worse or come back after treatment  You have questions or concerns about your condition or care  Treatment:  Tinea versicolor is usually treated with an antifungal cream  You may need to use the cream for up to 4 weeks to treat your symptoms  You may also need to use a special shampoo on your skin  Apply the cream or shampoo as directed  It may take several weeks or months after treatment for the color of your skin to return to normal   Manage or prevent tinea versicolor:  Tinea versicolor usually comes back, especially in hot and humid times of the year  You can manage the symptoms and help prevent it  Keep your skin clean and dry  Dry your skin completely after you bathe and play sports  Dry between your toes, between folds, and other areas where skin touches skin  You may also need to apply a special shampoo to your skin each month to prevent anther infection     Follow up with your doctor as directed:  Write down your questions so you remember to ask them during your visits  © Copyright [employer] 2022 Information is for End User's use only and may not be sold, redistributed or otherwise used for commercial purposes  All illustrations and images included in CareNotes® are the copyrighted property of A D A M [employer]  or Chelsy Vargas  The above information is an  only  It is not intended as medical advice for individual conditions or treatments  Talk to your doctor, nurse or pharmacist before following any medical regimen to see if it is safe and effective for you

## 2022-11-25 NOTE — PROGRESS NOTES
Assessment/Plan:  Problem List Items Addressed This Visit        Digestive    GERD (gastroesophageal reflux disease)     Stable s meds  Watch GERD diet  Endocrine    IFG (impaired fasting glucose) - Primary     Worsening  Start Metformin XR 500mg 3 pills  QD  Recommend lifestyle modifications  Relevant Medications    metFORMIN (GLUCOPHAGE-XR) 500 mg 24 hr tablet    Other Relevant Orders    Comprehensive metabolic panel    Ambulatory Referral to Diabetic Education    Comprehensive metabolic panel    Hemoglobin A1C       Musculoskeletal and Integument    Psoriasis     Stable on Triamcinolone cream PRN  Pending Derm consult - referred 8/23/22  Relevant Medications    selenium sulfide (SELSUN) 2 5 % shampoo    Other Relevant Orders    Vitamin D 25 hydroxy    Scalp psoriasis     Scalp Psoriasis - Stable on Clobetasol foam PRN  Pending Derm consult - referred 8/23/22  Relevant Medications    selenium sulfide (SELSUN) 2 5 % shampoo    Other Relevant Orders    Vitamin D 25 hydroxy       Other    Vitamin D deficiency     Low vitamin D - Recommend start multivitamin and over-the-counter vitamin D3 1000 - 3000 International Units daily  Relevant Orders    Comprehensive metabolic panel    Vitamin D 25 hydroxy    Generalized anxiety disorder     Stable without mood medications or counseling  Relevant Orders    Comprehensive metabolic panel    TSH, 3rd generation with Free T4 reflex    Other hyperlipidemia     Stable s statin  Recommend lifestyle modifications      The 10-year ASCVD risk score (Jordan QURESHI, et al , 2019) is: 1%    Values used to calculate the score:      Age: 50 years      Sex: Female      Is Non- : No      Diabetic: No      Tobacco smoker: No      Systolic Blood Pressure: 166 mmHg      Is BP treated: No      HDL Cholesterol: 53 mg/dL      Total Cholesterol: 207 mg/dL           Relevant Orders    Comprehensive metabolic panel    Lipid panel    TSH, 3rd generation with Free T4 reflex    LDL cholesterol, direct    Class 2 severe obesity with serious comorbidity and body mass index (BMI) of 38 0 to 38 9 in adult [facility])     Worsening  Recommend lifestyle modifications  Patient previously declined weight management consult  Relevant Orders    TSH, 3rd generation with Free T4 reflex   Other Visit Diagnoses     Tinea versicolor        Relevant Medications    selenium sulfide (SELSUN) 2 5 % shampoo    Handout given  Return for 4mo - IFG, HL, Anxiety, Vit D Def, Obesity, Labs  Future Appointments   Date Time Provider Mirna Jerome   3/30/2023  8:40 AM Ed Arango,  [facility]        Subjective:     Lobo Lynn is a 50 y o  female who presents today for a follow-up on her chronic medical conditions  HPI:  Chief Complaint   Patient presents with   • Follow-up     F/U IFG, HL, Adjust D/O, Anxiety, Vit D Def, Obesity, Labs  No new problems or concerns at this time  Last set of labs was done 8/23  •      Pt declines flu vaccine and covid boosters at this time  Overdue for mammo, will reprint slip  -- Above per clinical staff and reviewed  --      HPI      Today:    Return in about 6 weeks (around 10/4/2022) for F/U IFG, HL, Adjust D/O, Anxiety, Vit D Def, Obesity, Labs  Tinea Versicolor - Symptoms since summer        Obesity - Declines Weight Management - previously referred 2021   Trying to watch diet   No regular exercise  Active at work in [employer]       IFG / H/O GDM - Diet-controlled   No meds previously         Hyperlipidemia - No statin previously        GERD / Fatty Liver -  Stable, except for late night eating   No longer uses OTC acid reducer PRN    Improved s/p Laparoscopic cholecystectomy on 07/08/2021 per Dr Meredith Frye   Has diarrhea after eating         IFG / H/O GDM - Diet-controlled   No meds previously        OA - Knees and Ankles - Management per Podiatry  - [facility]  Next appt PRN  Stable on Voltaren gel and PO  Advised to attend PT to prevent recurrent ankle sprain        Anxiety -  Patient states mood is stable  Good social supports   No SI/HI/AH/VH   No mood meds or counseling previously          PHQ-2/9 Depression Screening    Little interest or pleasure in doing things: 0 - not at all  Feeling down, depressed, or hopeless: 0 - not at all  Trouble falling or staying asleep, or sleeping too much: 0 - not at all  Feeling tired or having little energy: 0 - not at all  Poor appetite or overeatin - not at all  Feeling bad about yourself - or that you are a failure or have let yourself or your family down: 0 - not at all  Trouble concentrating on things, such as reading the newspaper or watching television: 0 - not at all  Moving or speaking so slowly that other people could have noticed  Or the opposite - being so fidgety or restless that you have been moving around a lot more than usual: 0 - not at all  Thoughts that you would be better off dead, or of hurting yourself in some way: 0 - not at all  PHQ-2 Score: 0  PHQ-2 Interpretation: Negative depression screen  PHQ-9 Score: 0   PHQ-9 Interpretation: No or Minimal depression          GRACIELA-7 Flowsheet Screening    Flowsheet Row Most Recent Value   Over the last 2 weeks, how often have you been bothered by any of the following problems? Feeling nervous, anxious, or on edge 0   Not being able to stop or control worrying 0   Worrying too much about different things 0   Trouble relaxing 0   Being so restless that it is hard to sit still 0   Becoming easily annoyed or irritable 0   Feeling afraid as if something awful might happen 0   GRACIELA-7 Total Score 0           Vitamin D Deficiency - s/p Drisdol   No MVI and OTC Vitamin currently         +HANH - No Rheum consult previously   Negative repeat HANH 21        Psoriasis - Referred to Derm 22 -  No appt scheduled     Management per PCP   Has not seen Derm in years   Behind ears and B/L nares   Stable on Triamcinolone cream PRN        Scalp Psoriasis - Referred to Derm 22 - No appt scheudled    Management per PCP   Has not seen Derm in years   Stable on Clobetasol foam PRN  Worse c sweating                From previous note:    Return in about 6 months (around 2022) for 6mo - IFG, HL, Adjust D/O, Anxiety, Vit D Def, M Obesity, Labs      6mo - Overdue         Obesity - Declines Weight Management - previously referred    Trying to watch diet   No regular exercise  Active at work in [employer]       IFG / H/O GDM - Diet-controlled   No meds previously         Hyperlipidemia - No statin previously        GERD / Fatty Liver -  Stable, except for late night eating   No longer uses OTC acid reducer PRN  Improved s/p Laparoscopic cholecystectomy on 2021 per Dr Pradeep Campbell   Has diarrhea after eating         IFG / H/O GDM - Diet-controlled   No meds previously        OA - Knees and Ankles - Management per Podiatry  - [facility]  Next appt PRN  Stable on Voltaren gel and PO        Anxiety -  Patient states mood was stable  Good social supports   No SI/HI/AH/VH   No mood meds or counseling previously        PHQ-2/9 Depression Screening       Little interest or pleasure in doing things: 0 - not at all  Feeling down, depressed, or hopeless: 0 - not at all  Trouble falling or staying asleep, or sleeping too much: 0 - not at all  Feeling tired or having little energy: 0 - not at all  Poor appetite or overeatin - not at all  Feeling bad about yourself - or that you are a failure or have let yourself or your family down: 0 - not at all  Trouble concentrating on things, such as reading the newspaper or watching television: 0 - not at all  Moving or speaking so slowly that other people could have noticed   Or the opposite - being so fidgety or restless that you have been moving around a lot more than usual: 0 - not at all  Thoughts that you would be better off dead, or of hurting yourself in some way: 0 - not at all  PHQ-2 Score: 0  PHQ-2 Interpretation: Negative depression screen  PHQ-9 Score: 0   PHQ-9 Interpretation: No or Minimal depression                    GRACIELA-7 Flowsheet Screening    Flowsheet Row Most Recent Value   Over the last 2 weeks, how often have you been bothered by any of the following problems?     Feeling nervous, anxious, or on edge 0   Not being able to stop or control worrying 0   Worrying too much about different things 0   Trouble relaxing 0   Being so restless that it is hard to sit still 0   Becoming easily annoyed or irritable 0   Feeling afraid as if something awful might happen 0   GRACIELA-7 Total Score 0             Vitamin D Deficiency - s/p Drisdol   No MVI and OTC Vitamin C currently         +HANH - No Rheum consult previously   Negative repeat HANH 6/4/21        Psoriasis - Management per PCP   Has not seen Derm in years   Behind ears and B/L nares   Stable on Triamcinolone cream PRN        Scalp Psoriasis - Management per PCP   Has not seen Derm in years   Stable on Clobetasol foam PRN  Worse c sweating        Microscopic Hematuria - No Uro evaluation previously   Stable U/A 6/4/21          Reviewed:  Labs 8/23/22     No Gyn   Last saw Gyn years ago   No abnormal Paps in the past        No CRC screening previously        Sees Dentist q6 months  Sees Optho q2 years  The following portions of the patient's history were reviewed and updated as appropriate: allergies, current medications, past family history, past medical history, past social history, past surgical history and problem list       Review of Systems   Constitutional: Negative for appetite change, chills, diaphoresis, fatigue and fever  Respiratory: Negative for chest tightness and shortness of breath  Cardiovascular: Negative for chest pain  Gastrointestinal: Negative for abdominal pain, blood in stool, diarrhea, nausea and vomiting     Genitourinary: Negative for dysuria  Current Outpatient Medications   Medication Sig Dispense Refill   • clobetasol (OLUX) 0 05 % topical foam Apply 1 application topically 2 (two) times a day as needed (Scalp Psoriasis) For Scalp Psoriasis  100 g 0   • Diclofenac Sodium (VOLTAREN) 1 % Apply 2 g topically 4 (four) times a day 150 g 0   • metFORMIN (GLUCOPHAGE-XR) 500 mg 24 hr tablet Take 3 tablets (1,500 mg total) by mouth daily with breakfast Increase as directed  90 tablet 1   • selenium sulfide (SELSUN) 2 5 % shampoo Apply daily x 1 week  Do not apply to inflamed or broken skin; lather, then rinse after 10 minutes  118 mL 0   • triamcinolone (KENALOG) 0 1 % cream Apply 1 application topically 2 (two) times a day as needed        No current facility-administered medications for this visit  Objective:  /66   Pulse 66   Temp 98 °F (36 7 °C)   Resp 16   Ht 5' 5" (1 651 m)   Wt 104 kg (229 lb 14 4 oz)   SpO2 99%   BMI 38 26 kg/m²    Wt Readings from Last 3 Encounters:   11/25/22 104 kg (229 lb 14 4 oz)   11/04/22 101 kg (221 lb 14 4 oz)   09/28/22 104 kg (230 lb)      BP Readings from Last 3 Encounters:   11/25/22 108/66   11/04/22 121/74   09/28/22 115/75          Physical Exam  Vitals and nursing note reviewed  Constitutional:       Appearance: Normal appearance  She is well-developed and well-nourished  She is obese  HENT:      Head: Normocephalic and atraumatic  Eyes:      Conjunctiva/sclera: Conjunctivae normal    Neck:      Thyroid: No thyromegaly  Cardiovascular:      Rate and Rhythm: Normal rate and regular rhythm  Pulses: Intact distal pulses  Heart sounds: Normal heart sounds  Pulmonary:      Effort: Pulmonary effort is normal       Breath sounds: Normal breath sounds  Musculoskeletal:         General: No swelling, tenderness or edema  Cervical back: Neck supple  Right lower leg: No edema  Left lower leg: No edema     Skin:     Comments: B/L anterior and posterior shoulders c hypopigmented oval-shaped macules   Neurological:      General: No focal deficit present  Mental Status: She is alert and oriented to person, place, and time  Psychiatric:         Mood and Affect: Mood and affect normal          Lab Results:      Lab Results   Component Value Date    WBC 6 47 08/23/2022    HGB 11 8 08/23/2022    HCT 36 1 08/23/2022     08/23/2022    TRIG 109 08/23/2022    HDL 53 08/23/2022    LDLDIRECT 130 (H) 08/23/2022    ALT 20 08/23/2022    AST 19 08/23/2022    K 4 6 08/23/2022     08/23/2022    CREATININE 0 77 08/23/2022    BUN 11 08/23/2022    CO2 26 08/23/2022    GLUF 85 08/23/2022    HGBA1C 5 9 (H) 08/23/2022     Lab Results   Component Value Date    URICACID 5 9 06/04/2021     Invalid input(s): BASENAME Vitamin D    Colonoscopy    Result Date: 11/4/2022  Narrative: [facility] [address] [place] [address] [phone] DATE OF SERVICE: 11/04/22 PHYSICIAN(S): Attending: Sherita Singh MD Fellow: No Staff Documented INDICATION: Special screening for malignant neoplasms, colon POST-OP DIAGNOSIS: See the impression below  HISTORY: Prior colonoscopy: No prior colonoscopy  BOWEL PREPARATION: Miralax/Dulcolax PREPROCEDURE: Informed consent was obtained for the procedure, including sedation  Risks including but not limited to bleeding, infection, perforation, adverse drug reaction and aspiration were explained in detail  Also explained about less than 100% sensitivity with the exam and other alternatives  The patient was placed in the left lateral decubitus position  DETAILS OF PROCEDURE: Patient was taken to the procedure room where a time out was performed to confirm correct patient and correct procedure   The patient underwent monitored anesthesia care, which was administered by an anesthesia professional  The patient's blood pressure, heart rate, level of consciousness, oxygen and respirations were monitored throughout the procedure  A digital rectal exam was performed  The scope was introduced through the anus and advanced to the cecum  Retroflexion was performed in the cecum and rectum  The quality of bowel preparation was evaluated using the [facility] Bowel Preparation Scale with scores of: right colon = 2, transverse colon = 2, left colon = 2  The total BBPS score was 6  Bowel prep was adequate  The patient experienced no blood loss  The procedure was not difficult  The patient tolerated the procedure well  There were no apparent complications  ANESTHESIA INFORMATION: ASA: II Anesthesia Type: Anesthesia type not filed in the log  MEDICATIONS: No administrations occurring from 0847 to 0910 on 11/04/22 FINDINGS: Few small diverticula in the descending colon and sigmoid colon EVENTS: Procedure Events Event Event Time ENDO CECUM REACHED 11/4/2022  9:02 AM ENDO SCOPE OUT TIME 11/4/2022  9:09 AM SPECIMENS: * No specimens in log * EQUIPMENT: Colonoscope -[id number]     Impression: Few scattered left-sided diverticula RECOMMENDATION: Repeat screening colonoscopy in 10 years   Óscar Puente MD        POCT Labs        Depression Screening and Follow-up Plan: Patient was screened for depression during today's encounter  They screened negative with a PHQ-2 score of 0

## 2022-11-25 NOTE — ASSESSMENT & PLAN NOTE
Low vitamin D - Recommend start multivitamin and over-the-counter vitamin D3 1000 - 3000 International Units daily

## 2022-11-25 NOTE — ASSESSMENT & PLAN NOTE
Worsening  Recommend lifestyle modifications  Patient previously declined weight management consult

## 2022-11-25 NOTE — ASSESSMENT & PLAN NOTE
Stable s statin  Recommend lifestyle modifications      The 10-year ASCVD risk score (Jordan QURESHI, et al , 2019) is: 1%    Values used to calculate the score:      Age: 50 years      Sex: Female      Is Non- : No      Diabetic: No      Tobacco smoker: No      Systolic Blood Pressure: 512 mmHg      Is BP treated: No      HDL Cholesterol: 53 mg/dL      Total Cholesterol: 207 mg/dL

## 2022-11-28 ENCOUNTER — TELEPHONE (OUTPATIENT)
Dept: OBGYN CLINIC | Facility: HOSPITAL | Age: 52
End: 2022-11-28

## 2022-11-28 DIAGNOSIS — S93.492D SPRAIN OF ANTERIOR TALOFIBULAR LIGAMENT OF LEFT ANKLE, SUBSEQUENT ENCOUNTER: Primary | ICD-10-CM

## 2022-11-28 NOTE — TELEPHONE ENCOUNTER
Caller: Patient     Doctor: Lachman    Reason for call: Patient states she called to schedule PT for the left ankle but there is no order in the chart  Please call patient once order is placed      Call back#: [phone]

## 2022-12-08 ENCOUNTER — APPOINTMENT (OUTPATIENT)
Dept: PHYSICAL THERAPY | Facility: REHABILITATION | Age: 52
End: 2022-12-08

## 2022-12-15 ENCOUNTER — EVALUATION (OUTPATIENT)
Dept: PHYSICAL THERAPY | Facility: REHABILITATION | Age: 52
End: 2022-12-15

## 2022-12-15 DIAGNOSIS — M25.572 CHRONIC PAIN OF LEFT ANKLE: Primary | ICD-10-CM

## 2022-12-15 DIAGNOSIS — G89.29 CHRONIC PAIN OF LEFT ANKLE: Primary | ICD-10-CM

## 2022-12-15 NOTE — PROGRESS NOTES
PT Evaluation     Today's date: 12/15/2022  Patient name: Piedad Arenas  : 1970  MRN: [id number]  Referring provider: Monalisa Waller MD  Dx:   Encounter Diagnosis     ICD-10-CM    1  Chronic pain of right ankle  M25 571     G89 29                      Assessment  Assessment details: Patient presents to PT with L ankle pain  Patient displays decreased ankle ROM, ankle strength and TC joint hypomobility  These impairments are leading to pain with walking, standing and stair climbing  Patient will benefit from skilled PT to address above impairments and help them return to PLOF  Impairments: abnormal coordination, abnormal gait, abnormal muscle firing, abnormal or restricted ROM, abnormal movement, activity intolerance, impaired balance, impaired physical strength, lacks appropriate home exercise program, pain with function and weight-bearing intolerance  Barriers to therapy: Prolonged duration of symptoms  OA  Understanding of Dx/Px/POC: good   Prognosis: good    Goals  STG  1  Patient will display decreased pain to 0-2 in 6 weeks  2  Patient will display ankle ROM WNL in 6 weeks  3  Patient will display ankle strength WNL in 6 weeks    LTG  1  Patient will be able to stand for 30 minutes without pain in 12 weeks  2  Patient will be able to walk for 30 minutes without pain in 12 weeks  3   Patient will be able to go up/down 3 flights of stairs without pain in 12 weeks    Plan  Patient would benefit from: PT eval and skilled physical therapy  Referral necessary: Yes  Planned modality interventions: TENS, cryotherapy, thermotherapy: hydrocollator packs and traction  Planned therapy interventions: manual therapy, joint mobilization, massage, motor coordination training, neuromuscular re-education, patient education, strengthening, stretching, therapeutic activities, therapeutic exercise, therapeutic training, home exercise program, graded exercise, graded activity, gait training, functional ROM exercises, flexibility, fine motor coordination training, balance/weight bearing training, balance, ADL training and activity modification  Frequency: 2x week  Duration in visits: 12  Duration in weeks: 6  Plan of Care beginning date: 12/15/2022  Plan of Care expiration date: 2023  Treatment plan discussed with: patient        Subjective Evaluation    History of Present Illness  Mechanism of injury: Patient states the L ankle has been bothering her for some time now  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 her  Patient had similar symptoms on the R ankle years ago and they did a scope to clean the ankle out  Patient has recently changed sneakers which has helped  Pain is in the front of the ankle  Patient has pain with stairs and walking especially first thing in the morning          Not a recurrent problem   Quality of life: good    Pain  Current pain ratin  At best pain ratin  At worst pain ratin  Quality: sharp  Relieving factors: medications  Aggravating factors: standing, walking and stair climbing          Objective     Active Range of Motion   Left Ankle/Foot   Dorsiflexion (ke): 0 degrees with pain  Plantar flexion: WFL and with pain  Inversion: WFL  Eversion: WFL    Passive Range of Motion   Left Ankle/Foot    Dorsiflexion (ke): 0 degrees with pain  Plantar flexion: WFL and with pain  Inversion: WFL  Eversion: WFL    Right Ankle/Foot    Dorsiflexion (ke): 0 degrees with pain  Plantar flexion: WFL and with pain  Inversion: WFL  Eversion: WFL    Joint Play   Left Ankle/Foot  Hypomobile in the talocrural joint  Strength/Myotome Testing     Left Ankle/Foot   Dorsiflexion: 4  Plantar flexion: 4  Inversion: 4  Eversion: 4    Tests   Left Ankle/Foot   Negative for anterior drawer, eversion talar tilt and inversion talar tilt                Precautions:  R ankle sx      Manuals                                                                 Neuro Re-Ed Ther Ex                                                                                                                     Ther Activity                                       Gait Training                                       Modalities

## 2022-12-16 ENCOUNTER — OFFICE VISIT (OUTPATIENT)
Dept: PHYSICAL THERAPY | Facility: REHABILITATION | Age: 52
End: 2022-12-16

## 2022-12-16 DIAGNOSIS — M25.572 CHRONIC PAIN OF LEFT ANKLE: Primary | ICD-10-CM

## 2022-12-16 DIAGNOSIS — G89.29 CHRONIC PAIN OF LEFT ANKLE: Primary | ICD-10-CM

## 2022-12-16 NOTE — PROGRESS NOTES
Daily Note     Today's date: 2022  Patient name: Roslyn Jacobsen  : 1970  MRN: [id number]  Referring provider: Chelita Hu MD  Dx:   Encounter Diagnosis     ICD-10-CM    1  Chronic pain of left ankle  M25 572     G89 29                      Subjective: patient states her ankle is a little sore from shoveling      Objective: See treatment diary below      Assessment: Tolerated treatment well  Patient demonstrated fatigue post treatment, exhibited good technique with therapeutic exercises and would benefit from continued PT Patient with limited DF ROM  Patient tolerated all exercises well with mild discomfort      Plan: Continue per plan of care        Precautions:  R ankle sx      Manuals             Joint mobs done            PROM done                                      Neuro Re-Ed             4 way ankle [place] 3x10                                                                                          Ther Ex             Bike (for muscle strength) 5 mins            Calf stretch 3x30s                                                                                          Ther Activity             Heel raises 3x10                         Gait Training                                       Modalities

## 2022-12-20 ENCOUNTER — OFFICE VISIT (OUTPATIENT)
Dept: URGENT CARE | Facility: CLINIC | Age: 52
End: 2022-12-20

## 2022-12-20 VITALS — TEMPERATURE: 97.6 F | HEART RATE: 76 BPM | OXYGEN SATURATION: 98 % | RESPIRATION RATE: 16 BRPM

## 2022-12-20 DIAGNOSIS — H69.92 DISORDER OF LEFT EUSTACHIAN TUBE: Primary | ICD-10-CM

## 2022-12-21 NOTE — PROGRESS NOTES
[facility]        NAME: Nadir Maria is a 50 y o  female  : 1970    MRN: [id number]  DATE: 2022  TIME: 8:01 PM    Assessment and Plan   Disorder of left eustachian tube [H69 92]  1  Disorder of left eustachian tube              Patient Instructions     --OTC nasal steroid (Flonase, Nasocort) 2 sprays/nostril at bedtime x 3-5 days  --OTC decongestant (Sudafed)  --Seek re-evaluation if no resolution/worsening despite these measures over the next 3-5 days  Chief Complaint     Chief Complaint   Patient presents with   • Earache     L ear pain started this am, no signs of illness but states she is always congested due to allergies  History of Present Illness       Here with complaints of left ear pain since this morning  Some nasal congestion also which she attributes to allergies  Feels fine otherwise  No fever, cough, headache, decreased hearing, itching, otorrhea  Right ear feels normal    No OTC meds taken  Review of Systems   Review of Systems   Constitutional: Negative for fever  HENT: Positive for ear pain  Negative for ear discharge  Respiratory: Negative for cough  Neurological: Negative for headaches  Current Medications       Current Outpatient Medications:   •  clobetasol (OLUX) 0 05 % topical foam, Apply 1 application topically 2 (two) times a day as needed (Scalp Psoriasis) For Scalp Psoriasis  , Disp: 100 g, Rfl: 0  •  Diclofenac Sodium (VOLTAREN) 1 %, Apply 2 g topically 4 (four) times a day, Disp: 150 g, Rfl: 0  •  metFORMIN (GLUCOPHAGE-XR) 500 mg 24 hr tablet, Take 3 tablets (1,500 mg total) by mouth daily with breakfast Increase as directed , Disp: 90 tablet, Rfl: 1  •  triamcinolone (KENALOG) 0 1 % cream, Apply 1 application topically 2 (two) times a day as needed , Disp: , Rfl:   •  selenium sulfide (SELSUN) 2 5 % shampoo, Apply daily x 1 week  Do not apply to inflamed or broken skin; lather, then rinse after 10 minutes  , Disp: 118 mL, Rfl: 0    Current Allergies     Allergies as of 2022   • (No Known Allergies)            The following portions of the patient's history were reviewed and updated as appropriate: allergies, current medications, past family history, past medical history, past social history, past surgical history and problem list      Past Medical History:   Diagnosis Date   • Arthritis    • Class 2 severe obesity with serious comorbidity and body mass index (BMI) of 39 0 to 39 9 in adult [facility])    • COVID-19 2020   • Diabetes mellitus ([facility] [address] )     gestational   • Diverticulosis 2022   • Generalized anxiety disorder 2015   • GERD (gastroesophageal reflux disease)    • History of gestational diabetes ,    • IFG (impaired fasting glucose)    • Obesity    • Osteoarthritis     Osteoarthritis (Ankles & Knees)   • Other hyperlipidemia 2021   • Psoriasis 2015   • Scalp psoriasis 2021   • Vitamin D deficiency 2019       Past Surgical History:   Procedure Laterality Date   • ARTHROSCOPY ANKLE Right 2016    OA   •  SECTION   &    • CHOLECYSTECTOMY  2021   • UT LAP,CHOLECYSTECTOMY N/A 2021    Procedure: LAPAROSCOPIC POSSIBLE OPEN CHOLECYSTECTOMY;  Surgeon: Suzette Galarza MD;  Location:  MAIN OR;  Service: General   • TONSILLECTOMY         Family History   Problem Relation Age of Onset   • Diabetes Father 36        Type Unknown   • Heart attack Father 32   • Coronary artery disease Father 32   • Heart disease Father    • Heart defect Mother         R to L Shunt   • Muscular dystrophy Mother 32   • Heart disease Mother    • No Known Problems Brother    • No Known Problems Son    • Diabetes Maternal Grandmother    • Kidney disease Maternal Grandmother    • No Known Problems Maternal Grandfather    • Diabetes Paternal Grandmother    • Heart disease Paternal Grandmother    • Diabetes Paternal Grandfather    • Heart disease Paternal Grandfather    • No Known Problems Son          Medications have been verified  Objective   Pulse 76   Temp 97 6 °F (36 4 °C) (Temporal)   Resp 16   SpO2 98%   No LMP recorded  Physical Exam     Physical Exam  Constitutional:       General: She is not in acute distress  HENT:      Right Ear: Tympanic membrane and ear canal normal  There is no impacted cerumen  Left Ear: There is no impacted cerumen  Ears:      Comments: Left TM dull grey, partially retracted  Canal 40% occluded with cerumen proximally, but not impinging on TM  Nose: No congestion or rhinorrhea  Mouth/Throat:      Pharynx: No oropharyngeal exudate or posterior oropharyngeal erythema  Pulmonary:      Effort: Pulmonary effort is normal    Lymphadenopathy:      Cervical: No cervical adenopathy  Neurological:      Mental Status: She is alert

## 2022-12-22 ENCOUNTER — OFFICE VISIT (OUTPATIENT)
Dept: PHYSICAL THERAPY | Facility: REHABILITATION | Age: 52
End: 2022-12-22

## 2022-12-22 DIAGNOSIS — G89.29 CHRONIC PAIN OF LEFT ANKLE: Primary | ICD-10-CM

## 2022-12-22 DIAGNOSIS — M25.572 CHRONIC PAIN OF LEFT ANKLE: Primary | ICD-10-CM

## 2022-12-22 NOTE — PROGRESS NOTES
Daily Note     Today's date: 2022  Patient name: Panfilo Watson  : 1970  MRN: [id number]  Referring provider: Michael Blair MD  Dx: No diagnosis found  Subjective: patient states she felt good after the first session      Objective: See treatment diary below      Assessment: Tolerated treatment well  Patient demonstrated fatigue post treatment, exhibited good technique with therapeutic exercises and would benefit from continued PT Patient continues to be limited with ankle DF ROM  Patient challenged by addition of balance exercises      Plan: Continue per plan of care        Precautions:  R ankle sx      Manuals            Joint mobs done done           PROM done done                                     Neuro Re-Ed             4 way ankle Pink 3x10 Pink 3x10           balance  airex tandem 3x30s                                                                            Ther Ex             Bike (for muscle strength) 5 mins 5 mins           Calf stretch 3x30s 3x30s                                                                                         Ther Activity             Heel raises 3x10 3x10                        Gait Training                                       Modalities

## 2022-12-23 ENCOUNTER — OFFICE VISIT (OUTPATIENT)
Dept: PHYSICAL THERAPY | Facility: REHABILITATION | Age: 52
End: 2022-12-23

## 2022-12-23 DIAGNOSIS — G89.29 CHRONIC PAIN OF LEFT ANKLE: Primary | ICD-10-CM

## 2022-12-23 DIAGNOSIS — M25.572 CHRONIC PAIN OF LEFT ANKLE: Primary | ICD-10-CM

## 2022-12-23 NOTE — PROGRESS NOTES
Daily Note     Today's date: 2022  Patient name: Mitzi Stone  : 1970  MRN: [id number]  Referring provider: Radha Santo MD  Dx:   Encounter Diagnosis     ICD-10-CM    1  Chronic pain of left ankle  M25 572     G89 29           Start Time: 915  Stop Time: 955  Total time in clinic (min): 40 minutes    Subjective: patient states her car didn't start this morning but other than that she is doing well      Objective: See treatment diary below      Assessment: Tolerated treatment well  Patient demonstrated fatigue post treatment, exhibited good technique with therapeutic exercises and would benefit from continued PT Patient with improving ankle DF ROM  Patient challneged by balance progressions      Plan: Continue per plan of care        Precautions:  R ankle sx      Manuals           Joint mobs done done done          PROM done done done                                    Neuro Re-Ed             4 way ankle Pink 3x10 Pink 3x10 Pink 3x10          balance  airex tandem 3x30s airex tandem 3x30s                                                                           Ther Ex             Bike (for muscle strength) 5 mins 5 mins 5 mins          Calf stretch 3x30s 3x30s 3x30s                                                                                        Ther Activity             Heel raises 3x10 3x10 3x10                       Gait Training                                       Modalities

## 2022-12-29 ENCOUNTER — OFFICE VISIT (OUTPATIENT)
Dept: PHYSICAL THERAPY | Facility: REHABILITATION | Age: 52
End: 2022-12-29

## 2022-12-29 DIAGNOSIS — G89.29 CHRONIC PAIN OF LEFT ANKLE: Primary | ICD-10-CM

## 2022-12-29 DIAGNOSIS — M25.572 CHRONIC PAIN OF LEFT ANKLE: Primary | ICD-10-CM

## 2022-12-29 NOTE — PROGRESS NOTES
Daily Note     Today's date: 2022  Patient name: Alejandra Rojas  : 1970  MRN: [id number]  Referring provider: Chio Rolle MD  Dx:   Encounter Diagnosis     ICD-10-CM    1  Chronic pain of left ankle  M25 572     G89 29                      Subjective: patient states she is still having issues standing still at work      Objective: See treatment diary below      Assessment: Tolerated treatment well  Patient demonstrated fatigue post treatment, exhibited good technique with therapeutic exercises and would benefit from continued PT      Plan: Continue per plan of care        Precautions:  R ankle sx      Manuals          Joint mobs done done done done         PROM done done done done                                   Neuro Re-Ed             4 way ankle Pink 3x10 Pink 3x10 Pink 3x10 Pink 3x12         balance  airex tandem 3x30s airex tandem 3x30s airex tandem 3x30s         SLS    3x30s                                                             Ther Ex             Bike (for muscle strength) 5 mins 5 mins 5 mins 5 mins         Calf stretch 3x30s 3x30s 3x30s 3x30s         Toe raises    3x10                                                                          Ther Activity             Heel raises 3x10 3x10 3x10 3x12                      Gait Training                                       Modalities

## 2022-12-30 ENCOUNTER — APPOINTMENT (OUTPATIENT)
Dept: PHYSICAL THERAPY | Facility: REHABILITATION | Age: 52
End: 2022-12-30

## 2023-01-05 NOTE — PATIENT INSTRUCTIONS
Please contact your insurance if you are uncertain of coverage for plan of care items  Your insurance may not cover the cost of your Vitamin D blood test, which is approximately $65-70  Please notify the lab prior to blood draw if you would like to decline this test       Hi Jennifer Fitzgerald, -       Thank you for notifying us regarding your interest in the Covid-19 vaccine  At this time, until vaccine supply ramps up, [facility] is currently only scheduling vaccines for healthcare workers, first responders and individuals ages 76 and older to ensure we have adequate vaccine supply for these high-risk groups  In an effort to give you the tools to help you, your family, and loved ones who are asking for this information, and/or seeking assistance, we highly encourage you to take either step below to help pre-register for the Covid-19 vaccine:     1  If you do not have a [facility] account, visit [email or web address] [email or web address] and sign-up, and then complete the brief questionnaire within [address]  Anyone, of any age, may pre-register on [facility]  OR    2  If you are unable to use [facility], please call [phone], option [phone], and follow the prompts  Note: Due to high call volumes, we are hoping to reserve the use of the call-in option for individuals 75+ at this time  Please encourage your loved ones to use [facility]  Need further assistance? Visit:  Margarette crowder       Thank you,       [address] and Websites for Counseling, Anxiety/Depression, Chronic Pain, Lifestyle Change, Problem-solving, Self-Esteem, Anger Management, Coping with Uncertainty    (Prices current as of 9/5/19)    1  Mood Kit - Available in [employer] Store for $4 99  Supports a person's success in specific situations, includes thought , mood tracker, and journal   Can be accessed in an unstructured way and used as an unguided self-help elizabeth        2   Moodnotes - The patient is requesting a refill on: Diazepam 5mg -  Take 1 tablet (5 mg total) by mouth every 12 (twelve) hours as needed for Anxiety    Last OV: 9/6/22  Next OV: None  Last refilled: 11/7/22 #60 with no refills Available in [employer] Store for $4 99  Focuses on healthier thinking habits and identifying "traps" in thinking  Tracks mood over a period of time and identifies factors that influence mood  3   MoodMission - Available in [employer] for free  Includes five "missions" to promote confidence in handling stressors and coping in specific situations  The elizabeth personalizes its style and techniques based on when the user engages it most frequently  In-elizabeth rewards are used to motivate, increase fun and self-confidence  Helpful for patients who need improvement in mood or a decrease in anxiety and depression symptoms  4    What's Up - Available in [employer] for free  Recognizes negative thinking patterns and methods to overcome them  Uses helpful metaphors, a catastrophe scale, grounding techniques, and breathing exercises  Has the ability to sync data across multiple devices and protect this information with a unique pass code  Has the capability to be active in forums where people can discuss similar feelings and strategies that have been helpful  5   Moodpath - Available in [employer] for free  Uses daily screenings to create a better understanding of thoughts, feelings, and emotions  When needed, it provides a discussion guide to talking with a medical professional based on the responses to daily screenings  Includes over 150 psychological exercises and videos to promote and strengthen overall mental health  6   MindShift - Available in [employer] for Temporal Power  Helpful for youth and young adults in coping with anxiety  Creates an individualized toolbox to help users deal with test anxiety, perfectionism, social anxiety, worry, panic, and conflict    Includes directions on how to construct belief experiments to trial common beliefs that feed anxiety, guided relaxation, as well as tools and tips to help establish and accomplish goals  Differentiates between helpful and unhelpful anxiety, and explains how to overcome fears by gradually facing them in manageable steps  7   CBT-I  - Available in [employer] for free  For insomnia  Educates about sleep, developing positive sleep routines, and improved sleep environment  Encourages user to change behaviors which will provide confidence for better sleep on a regular basis  8   PowerCard  - Free website  Provides self-help and therapy resources that encourages change to combat negative and destructive thought patterns  Includes access to numerous handouts on a variety of symptoms related to anxiety, depression, low self-esteem, panic attacks, social disorders, and more  The solution section has interventions that can be printed and saved for future use  9   Woebot - Available in [employer] for free  Recommended for age 16+  Friendly self-care  that helps you think through situations with step-by-step guidance using counseling tools, learn about yourself with intelligent mood tracking, and master skills to reduce stress and live happier through 100+ evidence-based stories from clinicians  Chat as often or as little as you'd like, whenever you'd like to  10   Briseyda:  SUDHIR  CBT DBT Chatbot - Available in Apple appsstore and Google Play for free, has in-elizabeth purchases  Recommended for 12+  Psychologist support at $30/month, 50% off to start  Laenn Feliz / Mónica Vaughn is free  Uses techniques of CBT, DBT, yoga, and meditation to support your needs regarding stress, anxiety, sleep, loss, and a full range of other mental health and wellness issues  11   Curable Pain Relief - Available in Apple Elizabeth store and Google Play for free, has in-elizabeth purchases  Teaches about the chronic pain cycle and how to reverse it, while retraining your brain and nervous system for long-term results    Smart  Mirtha Johnson guides user through updates in pain science to identify, target, eliminate the factors that are keeping user "stuck" in the pain cycle  12   [facility]ks[facility] Online Therapy - Available in Apple Elizabeth store and Google Play for a fee  Subscription service, starting at $59/week (billed monthly)  All plans include unlimited messaging, and add live video sessions if desired  Unlimited messaging therapy for teens ages 15-14 and special services for couples therapy  You can change therapists or stop subscription renewal at any time  Recommended for 13+  User is matched with a licensed therapist in your state and communicate on your device by text, audio, and video from anywhere, at any time  User will hear back at least once a day, 5 days per week  Refer to the back of insurance card for counseling options  Counseling services:    [facility]   82 [facility], [address] [place] [address], [facility], [address] (they have equine therapy too)  [address] [place] [address] [address],  [facility], [address] [facility] [address], [address],  [address], [address]    70 [facility], [address] [place] [address]   [phone]     1234 [facility]  200 [facility], [address]  [facility] [address] 49    162 [facility]   [address], [place], [address]     [address] [facility]   2102 [facility], [address] [facility] [address]     Daisy Uriostegui, MSW, LSW  (patients age 11-adult)   [address], [address] [place] [address], [address], 50 [facility], [address]   [facility], [address] [facility]  [phone]     [facility], [address] [place] [address], [facility], [facility], [address] (specializing in addiction)  [place], [place] [address]  [address] Zoila Alessandro, 6100 [facility]   727 [facility], [address] [facility] , [address], [place], 6100 [facility]   [phone]       Lorraine Aranda, MS, [facility], [facility]  [address]  [address] [facility], [address], [place], [address]  [address] [address], 26[address] [facility], [facility] [address]   [phone]    Mirza Sutton, [address] [place] [id number] [phone]      Hotlines:   Please program these numbers into your phone in case you or someone you know needs them  All services are free  WarmLine:  [phone] or [phone]   They provide a supportive listening ear if you need it  They also can also provide information about mental health concerns and services  Crisis Line:  [facility] [phone],  [facility] [phone], [address] [place] [address] and [place]: [phone]   24/7 crisis counseling, on-site counseling and walk-in counseling services available  National Suicide Prevention Lifeline:  5-130.867.1447  En [address] [facility] [phone]   This is free, confidential, and available 24/7  [place]: [phone]   For those facing or having survived abuse 24 hour confidential help line, emergency safe house, counseling, advocacy and education  Crisis Text Line: text 511208     You are connected to a Crisis Counselor to bring a hot moment to a cool calm through active listening and collaborative problem solving  If you or someone your know are feeling as though you are going to hurt yourself, do not wait - GET HELP RIGHT AWAY  Go to the closest Emergency Room, call 911, or call someone you trust, family member or friend to be with you until you can get some help  Wellness Visit for Adults   AMBULATORY CARE:   A wellness visit  is when you see your healthcare provider to get screened for health problems   Your healthcare provider will also give you advice on how to stay healthy  Write down your questions so you remember to ask them  Ask your healthcare provider how often you should have a wellness visit  What happens at a wellness visit:  Your healthcare provider will ask about your health, and your family history of health problems  This includes high blood pressure, heart disease, and cancer  He or she will ask if you have symptoms that concern you, if you smoke, and about your mood  You may also be asked about your intake of medicines, supplements, food, and alcohol  Any of the following may be done:  · Your weight  will be checked  Your height may also be checked so your body mass index (BMI) can be calculated  Your BMI shows if you are at a healthy weight  · Your blood pressure  and heart rate will be checked  Your temperature may also be checked  · Blood and urine tests  may be done  Blood tests may be done to check your cholesterol levels  Abnormal cholesterol levels increase your risk for heart disease and stroke  You may also need a blood or urine test to check for diabetes if you are at increased risk  Urine tests may be done to look for signs of an infection or kidney disease  · A physical exam  includes checking your heartbeat and lungs with a stethoscope  Your healthcare provider may also check your skin to look for sun damage  · Screening tests  may be recommended  A screening test is done to check for diseases that may not cause symptoms  The screening tests you may need depend on your age, gender, family history, and lifestyle habits  For example, colorectal screening may be recommended if you are 48years old or older  Screening tests you need if you are a woman:   · A Pap smear  is used to screen for cervical cancer  Pap smears are usually done every 3 to 5 years depending on your age   You may need them more often if you have had abnormal Pap smear test results in the past  Ask your healthcare provider how often you should have a Pap smear  · A mammogram  is an x-ray of your breasts to screen for breast cancer  Experts recommend mammograms every 2 years starting at age 48 years  You may need a mammogram at age 52 years or younger if you have an increased risk for breast cancer  Talk to your healthcare provider about when you should start having mammograms and how often you need them  Vaccines you may need:   · Get an influenza vaccine  every year  The influenza vaccine protects you from the flu  Several types of viruses cause the flu  The viruses change over time, so new vaccines are made each year  · Get a tetanus-diphtheria (Td) booster vaccine  every 10 years  This vaccine protects you against tetanus and diphtheria  Tetanus is a severe infection that may cause painful muscle spasms and lockjaw  Diphtheria is a severe bacterial infection that causes a thick covering in the back of your mouth and throat  · Get a human papillomavirus (HPV) vaccine  if you are female and aged 23 to 32 or male 23 to 24 and never received it  This vaccine protects you from HPV infection  HPV is the most common infection spread by sexual contact  HPV may also cause vaginal, penile, and anal cancers  · Get a pneumococcal vaccine  if you are aged 72 years or older  The pneumococcal vaccine is an injection given to protect you from pneumococcal disease  Pneumococcal disease is an infection caused by pneumococcal bacteria  The infection may cause pneumonia, meningitis, or an ear infection  · Get a shingles vaccine  if you are 60 or older, even if you have had shingles before  The shingles vaccine is an injection to protect you from the varicella-zoster virus  This is the same virus that causes chickenpox  Shingles is a painful rash that develops in people who had chickenpox or have been exposed to the virus  How to eat healthy:  My Plate is a model for planning healthy meals   It shows the types and amounts of foods that should go on your plate  Fruits and vegetables make up about half of your plate, and grains and protein make up the other half  A serving of dairy is included on the side of your plate  The amount of calories and serving sizes you need depends on your age, gender, weight, and height  Examples of healthy foods are listed below:  · Eat a variety of vegetables  such as dark green, red, and orange vegetables  You can also include canned vegetables low in sodium (salt) and frozen vegetables without added butter or sauces  · Eat a variety of fresh fruits , canned fruit in 100% juice, frozen fruit, and dried fruit  · Include whole grains  At least half of the grains you eat should be whole grains  Examples include whole-wheat bread, wheat pasta, brown rice, and whole-grain cereals such as oatmeal     · Eat a variety of protein foods such as seafood (fish and shellfish), lean meat, and poultry without skin (turkey and chicken)  Examples of lean meats include pork leg, shoulder, or tenderloin, and beef round, sirloin, tenderloin, and extra lean ground beef  Other protein foods include eggs and egg substitutes, beans, peas, soy products, nuts, and seeds  · Choose low-fat dairy products such as skim or 1% milk or low-fat yogurt, cheese, and cottage cheese  · Limit unhealthy fats  such as butter, hard margarine, and shortening  Exercise:  Exercise at least 30 minutes per day on most days of the week  Some examples of exercise include walking, biking, dancing, and swimming  You can also fit in more physical activity by taking the stairs instead of the elevator or parking farther away from stores  Include muscle strengthening activities 2 days each week  Regular exercise provides many health benefits  It helps you manage your weight, and decreases your risk for type 2 diabetes, heart disease, stroke, and high blood pressure  Exercise can also help improve your mood   Ask your healthcare provider about the best exercise plan for you  General health and safety guidelines:   · Do not smoke  Nicotine and other chemicals in cigarettes and cigars can cause lung damage  Ask your healthcare provider for information if you currently smoke and need help to quit  E-cigarettes or smokeless tobacco still contain nicotine  Talk to your healthcare provider before you use these products  · Limit alcohol  A drink of alcohol is 12 ounces of beer, 5 ounces of wine, or 1½ ounces of liquor  · Lose weight, if needed  Being overweight increases your risk of certain health conditions  These include heart disease, high blood pressure, type 2 diabetes, and certain types of cancer  · Protect your skin  Do not sunbathe or use tanning beds  Use sunscreen with a SPF 15 or higher  Apply sunscreen at least 15 minutes before you go outside  Reapply sunscreen every 2 hours  Wear protective clothing, hats, and sunglasses when you are outside  · Drive safely  Always wear your seatbelt  Make sure everyone in your car wears a seatbelt  A seatbelt can save your life if you are in an accident  Do not use your cell phone when you are driving  This could distract you and cause an accident  Pull over if you need to make a call or send a text message  · Practice safe sex  Use latex condoms if are sexually active and have more than one partner  Your healthcare provider may recommend screening tests for sexually transmitted infections (STIs)  · Wear helmets, lifejackets, and protective gear  Always wear a helmet when you ride a bike or motorcycle, go skiing, or play sports that could cause a head injury  Wear protective equipment when you play sports  Wear a lifejacket when you are on a boat or doing water sports  © Copyright [address] Information is for End User's use only and may not be sold, redistributed or otherwise used for commercial purposes   All illustrations and images included in CareNotes® are the copyrighted property of A D A M , Inc  or [address]   The above information is an  only  It is not intended as medical advice for individual conditions or treatments  Talk to your doctor, nurse or pharmacist before following any medical regimen to see if it is safe and effective for you  Obesity   AMBULATORY CARE:   Obesity  is when your body mass index (BMI) is greater than 30  Your healthcare provider will use your height and weight to measure your BMI  The risks of obesity include  many health problems, such as injuries or physical disability  You may need tests to check for the following:  · Diabetes    · High blood pressure or high cholesterol    · Heart disease    · Gallbladder or liver disease    · Cancer of the colon, breast, prostate, liver, or kidney    · Sleep apnea    · Arthritis or gout    Seek care immediately if:   · You have a severe headache, confusion, or difficulty speaking  · You have weakness on one side of your body  · You have chest pain, sweating, or shortness of breath  Contact your healthcare provider if:   · You have symptoms of gallbladder or liver disease, such as pain in your upper abdomen  · You have knee or hip pain and discomfort while walking  · You have symptoms of diabetes, such as intense hunger and thirst, and frequent urination  · You have symptoms of sleep apnea, such as snoring or daytime sleepiness  · You have questions or concerns about your condition or care  Treatment for obesity  focuses on helping you lose weight to improve your health  Even a small decrease in BMI can reduce the risk for many health problems  Your healthcare provider will help you set a weight-loss goal   · Lifestyle changes  are the first step in treating obesity  These include making healthy food choices and getting regular physical activity   Your healthcare provider may suggest a weight-loss program that involves coaching, education, and therapy  · Medicine  may help you lose weight when it is used with a healthy diet and physical activity  · Surgery  can help you lose weight if you are very obese and have other health problems  There are several types of weight-loss surgery  Ask your healthcare provider for more information  Be successful losing weight:   · Set small, realistic goals  An example of a small goal is to walk for 20 minutes 5 days a week  Anther goal is to lose 5% of your body weight  · Tell friends, family members, and coworkers about your goals  and ask for their support  Ask a friend to lose weight with you, or join a weight-loss support group  · Identify foods or triggers that may cause you to overeat , and find ways to avoid them  Remove tempting high-calorie foods from your home and workplace  Place a bowl of fresh fruit on your kitchen counter  If stress causes you to eat, then find other ways to cope with stress  · Keep a diary to track what you eat and drink  Also write down how many minutes of physical activity you do each day  Weigh yourself once a week and record it in your diary  Eating changes: You will need to eat 500 to 1,000 fewer calories each day than you currently eat to lose 1 to 2 pounds a week  The following changes will help you cut calories:  · Eat smaller portions  Use small plates, no larger than 9 inches in diameter  Fill your plate half full of fruits and vegetables  Measure your food using measuring cups until you know what a serving size looks like  · Eat 3 meals and 1 or 2 snacks each day  Plan your meals in advance  Donita Jessica and eat at home most of the time  Eat slowly  Do not skip meals  Skipping meals can lead to overeating later in the day  This can make it harder for you to lose weight  Talk with a dietitian to help you make a meal plan and schedule that is right for you      · Eat fruits and vegetables at every meal   They are low in calories and high in fiber, which makes you feel full  Do not add butter, margarine, or cream sauce to vegetables  Use herbs to season steamed vegetables  · Eat less fat and fewer fried foods  Eat more baked or grilled chicken and fish  These protein sources are lower in calories and fat than red meat  Limit fast food  Dress your salads with olive oil and vinegar instead of bottled dressing  · Limit the amount of sugar you eat  Do not drink sugary beverages  Limit alcohol  Activity changes:  Physical activity is good for your body in many ways  It helps you burn calories and build strong muscles  It decreases stress and depression, and improves your mood  It can also help you sleep better  Talk to your healthcare provider before you begin an exercise program   · Exercise for at least 30 minutes 5 days a week  Start slowly  Set aside time each day for physical activity that you enjoy and that is convenient for you  It is best to do both weight training and an activity that increases your heart rate, such as walking, bicycling, or swimming  · Find ways to be more active  Do yard work and housecleaning  Walk up the stairs instead of using elevators  Spend your leisure time going to events that require walking, such as outdoor festivals or fairs  This extra physical activity can help you lose weight and keep it off  Follow up with your healthcare provider as directed: You may need to meet with a dietitian  Write down your questions so you remember to ask them during your visits  © Copyright [address] Information is for End User's use only and may not be sold, redistributed or otherwise used for commercial purposes  All illustrations and images included in CareNotes® are the copyrighted property of A D A [employer]  or [facility] Padmaja Puri   The above information is an  only  It is not intended as medical advice for individual conditions or treatments   Talk to your doctor, nurse or pharmacist before following any medical regimen to see if it is safe and effective for you  Cholesterol and Your Health   AMBULATORY CARE:   Cholesterol  is a waxy, fat-like substance  Your body uses cholesterol to make hormones and new cells, and to protect nerves  Cholesterol is made by your body  It also comes from certain foods you eat, such as meat and dairy products  Your healthcare provider can help you set goals for your cholesterol levels  He or she can help you create a plan to meet your goals  Cholesterol level goals: Your cholesterol level goals depend on your risk for heart disease, your age, and your other health conditions  The following are general guidelines:  · Total cholesterol  includes low-density lipoprotein (LDL), high-density lipoprotein (HDL), and triglyceride levels  The total cholesterol level should be lower than 200 mg/dL and is best at about 150 mg/dL  · LDL cholesterol  is called bad cholesterol  because it forms plaque in your arteries  As plaque builds up, your arteries become narrow, and less blood flows through  When plaque decreases blood flow to your heart, you may have chest pain  If plaque completely blocks an artery that brings blood to your heart, you may have a heart attack  Plaque can break off and form blood clots  Blood clots may block arteries in your brain and cause a stroke  The level should be less than 130 mg/dL and is best at about 100 mg/dL  · HDL cholesterol  is called good cholesterol  because it helps remove LDL cholesterol from your arteries  It does this by attaching to LDL cholesterol and carrying it to your liver  Your liver breaks down LDL cholesterol so your body can get rid of it  High levels of HDL cholesterol can help prevent a heart attack and stroke  Low levels of HDL cholesterol can increase your risk for heart disease, heart attack, and stroke  The level should be 60 mg/dL or higher  · Triglycerides  are a type of fat that store energy from foods you eat  High levels of triglycerides also cause plaque buildup  This can increase your risk for a heart attack or stroke  If your triglyceride level is high, your LDL cholesterol level may also be high  The level should be less than 150 mg/dL  What increases your risk for high cholesterol:   · Smoking cigarettes    · Being overweight or obese, or not getting enough exercise    · Drinking large amounts of alcohol    · A medical condition such as hypertension (high blood pressure) or diabetes    · Certain genes passed from your parents to you    · Age older than 65 years    What you need to know about having your cholesterol levels checked: Adults 21to 39years of age should have their cholesterol levels checked every 4 to 6 years  Adults 45 years or older should have their cholesterol checked every 1 to 2 years  You may need your cholesterol checked more often, or at a younger age, if you have risk factors for heart disease  You may also need to have your cholesterol checked more often if you have other health conditions, such as diabetes  Blood tests are used to check cholesterol levels  Blood tests measure your levels of triglycerides, LDL cholesterol, and HDL cholesterol  How healthy fats affect your cholesterol levels:  Healthy fats, also called unsaturated fats, help lower LDL cholesterol and triglyceride levels  Healthy fats include the following:  · Monounsaturated fats  are found in foods such as olive oil, canola oil, avocado, nuts, and olives  · Polyunsaturated fats,  such as omega 3 fats, are found in fish, such as salmon, trout, and tuna  They can also be found in plant foods such as flaxseed, walnuts, and soybeans  How unhealthy fats affect your cholesterol levels:  Unhealthy fats increase LDL cholesterol and triglyceride levels  They are found in foods high in cholesterol, saturated fat, and trans fat:  · Cholesterol  is found in eggs, dairy, and meat      · Saturated fat  is found in butter, cheese, ice cream, whole milk, and coconut oil  Saturated fat is also found in meat, such as sausage, hot dogs, and bologna  · Trans fat  is found in liquid oils and is used in fried and baked foods  Foods that contain trans fats include chips, crackers, muffins, sweet rolls, microwave popcorn, and cookies  Treatment  for high cholesterol will also decrease your risk of heart disease, heart attack, and stroke  Treatment may include any of the following:  · Lifestyle changes  may include food, exercise, weight loss, and quitting smoking  You may also need to decrease the amount of alcohol you drink  Your healthcare provider will want you to start with lifestyle changes  Other treatment may be added if lifestyle changes are not enough  · Medicines  may be given to lower your LDL cholesterol, triglyceride levels, or total cholesterol level  You may need medicines to lower your cholesterol if any of the following is true:     ? You have a history of stroke, TIA, unstable angina, or a heart attack  ? Your LDL cholesterol level is 190 mg/dL or higher  ? You are age 36 to 76 years, have diabetes or heart disease risk factors, and your LDL cholesterol is 70 mg/dL or higher  · Supplements  include fish oil, red yeast rice, and garlic  Fish oil may help lower your triglyceride and LDL cholesterol levels  It may also increase your HDL cholesterol level  Red yeast rice may help decrease your total cholesterol level and LDL cholesterol level  Garlic may help lower your total cholesterol level  Do not take these supplements without talking to your healthcare provider  Food changes you can make to lower your cholesterol levels:  A dietitian can help you create a healthy eating plan  He or she can show you how to read food labels and choose foods low in saturated fat, trans fats, and cholesterol  · Decrease the total amount of fat you eat    Choose lean meats, fat-free or 1% fat milk, and low-fat dairy products, such as yogurt and cheese  Try to limit or avoid red meats  Limit or do not eat fried foods or baked goods, such as cookies  · Replace unhealthy fats with healthy fats  Cook foods in olive oil or canola oil  Choose soft margarines that are low in saturated fat and trans fat  Seeds, nuts, and avocados are other examples of healthy fats  · Eat foods with omega-3 fats  Examples include salmon, tuna, mackerel, walnuts, and flaxseed  Eat fish 2 times per week  Pregnant women should not eat fish that have high levels of mercury, such as shark, swordfish, and brayden mackerel  · Increase the amount of high-fiber foods you eat  High-fiber foods can help lower your LDL cholesterol  Aim to get between 20 and 30 grams of fiber each day  Fruits and vegetables are high in fiber  Eat at least 5 servings each day  Other high-fiber foods are whole-grain or whole-wheat breads, pastas, or cereals, and brown rice  Eat 3 ounces of whole-grain foods each day  Increase fiber slowly  You may have abdominal discomfort, bloating, and gas if you add fiber to your diet too quickly  · Eat healthy protein foods  Examples include low-fat dairy products, skinless chicken and turkey, fish, and nuts  · Limit foods and drinks that are high in sugar  Your dietitian or healthcare provider can help you create daily limits for high-sugar foods and drinks  The limit may be lower if you have diabetes or another health condition  Limits can also help you lose weight if needed  Lifestyle changes you can make to lower your cholesterol levels:   · Maintain a healthy weight  Ask your healthcare provider what a healthy weight is for you  Ask him or her to help you create a weight loss plan if needed  Weight loss can decrease your total cholesterol and triglyceride levels  Weight loss may also help keep your blood pressure at a healthy level  · Exercise regularly    Exercise can help lower your total cholesterol level and maintain a healthy weight  Exercise can also help increase your HDL cholesterol level  Work with your healthcare provider to create an exercise program that is right for you  Get at least 30 to 40 minutes of moderate exercise most days of the week  Examples of exercise include brisk walking, swimming, or biking  · Do not smoke  Nicotine and other chemicals in cigarettes and cigars can raise your cholesterol levels  Ask your healthcare provider for information if you currently smoke and need help to quit  E-cigarettes or smokeless tobacco still contain nicotine  Talk to your healthcare provider before you use these products  · Limit or do not drink alcohol  Alcohol can increase your triglyceride levels  Ask your healthcare provider before you drink alcohol  Ask how much is okay for you to drink in 1 day or 1 week  © Copyright [address] Information is for End User's use only and may not be sold, redistributed or otherwise used for commercial purposes  All illustrations and images included in CareNotes® are the copyrighted property of [employer]  or [address] [place] [address]  The above information is an  only  It is not intended as medical advice for individual conditions or treatments  Talk to your doctor, nurse or pharmacist before following any medical regimen to see if it is safe and effective for you

## 2023-04-07 ENCOUNTER — CONSULT (OUTPATIENT)
Dept: DERMATOLOGY | Facility: CLINIC | Age: 53
End: 2023-04-07

## 2023-04-07 VITALS — WEIGHT: 234 LBS | BODY MASS INDEX: 38.99 KG/M2 | HEIGHT: 65 IN

## 2023-04-07 DIAGNOSIS — L21.9 SEBORRHEIC DERMATITIS OF SCALP: Primary | ICD-10-CM

## 2023-04-07 RX ORDER — KETOCONAZOLE 20 MG/ML
1 SHAMPOO TOPICAL ONCE
Qty: 1 ML | Refills: 0 | Status: SHIPPED | OUTPATIENT
Start: 2023-04-07 | End: 2023-04-07

## 2023-04-07 NOTE — PROGRESS NOTES
"[facility]     Patient Name: Sobeida Collins  Encounter Date: 04/07/2023     Have you been cared for by a [place] [address] Dermatologist in the last 3 years and, if so, which description applies to you? NO  I am considered a \"new\" patient and must complete all patient intake questions  I am FEMALE/of child-bearing potential     REVIEW OF SYSTEMS:  Have you recently had or currently have any of the following? · Recent fever or chills? No  · Any non-healing wound? No  · Are you pregnant or planning to become pregnant? No  · Are you currently or planning to be nursing or breast feeding? No   PAST MEDICAL HISTORY:  Have you personally ever had or currently have any of the following? If \"YES,\" then please provide more detail  · Skin cancer (such as Melanoma, Basal Cell Carcinoma, Squamous Cell Carcinoma? No  · Tuberculosis, HIV/AIDS, Hepatitis B or C: No  · Systemic Immunosuppression such as Diabetes, Biologic or Immunotherapy, Chemotherapy, Organ Transplantation, Bone Marrow Transplantation No  · Radiation Treatment No   FAMILY HISTORY:  Any \"first degree relatives\" (parent, brother, sister, or child) with the following? • Skin Cancer, Pancreatic or Other Cancer? No   PATIENT EXPERIENCE:    • Do you want the Dermatologist to perform a COMPLETE skin exam today including a clinical examination under the \"bra and underwear\" areas? NO  • If necessary, do we have your permission to call and leave a detailed message on your Preferred Phone number that includes your specific medical information? Yes      No Known Allergies   Current Outpatient Medications:   •  clobetasol (OLUX) 0 05 % topical foam, Apply 1 application topically 2 (two) times a day as needed (Scalp Psoriasis) For Scalp Psoriasis  , Disp: 100 g, Rfl: 0  •  Diclofenac Sodium (VOLTAREN) 1 %, Apply 2 g topically 4 (four) times a day, Disp: 150 g, Rfl: 0  •  metFORMIN (GLUCOPHAGE-XR) 500 mg 24 hr tablet, Take 3 tablets (1,500 mg total) " "by mouth daily with breakfast Increase as directed , Disp: 90 tablet, Rfl: 1  •  triamcinolone (KENALOG) 0 1 % cream, Apply 1 application topically 2 (two) times a day as needed , Disp: , Rfl:   •  selenium sulfide (SELSUN) 2 5 % shampoo, Apply daily x 1 week  Do not apply to inflamed or broken skin; lather, then rinse after 10 minutes  , Disp: 118 mL, Rfl: 0          • Whom besides the patient is providing clinical information about today's encounter?   o NO ADDITIONAL HISTORIAN (patient alone provided history)    Physical Exam and Assessment/Plan by Diagnosis:    SEBORRHEIC DERMATITIS    Physical Exam:  • Anatomic Location Affected:  Scalp, Nasal folds and behind ears   • Morphological Description:  Dandruff  • Pertinent Positives:  • Pertinent Negatives:  No clinical evidence of psoriasis today    Additional History of Present Condition:  Pt states she has some dry flaking skin on her scalp and behind the ears and nasal folds  Pt reports itching on the scalp  Assessment and Plan:  Based on a thorough discussion of this condition and the management approach to it (including a comprehensive discussion of the known risks, side effects and potential benefits of treatment), the patient (family) agrees to implement the following specific plan:  • Please start applying ciclopirox 0 77% cream 2 times daily as needed for flares on the face and ears  • Please start applying ketoconazole 2 % shampoo:  Lather into scalp; leave on for 5 minutes and then rinse off completely  • Monitor for any changes      Seborrheic Dermatitis   Seborrheic dermatitis is a common, chronic or relapsing form of eczema/dermatitis that mainly affects the sebaceous, gland-rich regions of the scalp, face, and trunk  There are infantile and adult forms of seborrhoeic dermatitis  It is sometimes associated with psoriasis and, in that clinical scenario, may be referred to as \"sebo-psoriasis  \"  Seborrheic dermatitis is also known as \"seborrheic " "eczema  \"  Dandruff (also called \"pityriasis capitis\") is an uninflamed form of seborrhoeic dermatitis  Dandruff presents as bran-like scaly patches scattered within hair-bearing areas of the scalp  In an infant, this condition may be referred to as \"cradle cap  \"  The cause of seborrheic dermatitis is not completely understood  It is associated with proliferation of various species of the skin commensal Malassezia, in its yeast (non-pathogenic) form  Its metabolites (such as the fatty acids oleic acid, malssezin, and indole-3-carbaldehyde) may cause an inflammatory reaction  Differences in skin barrier lipid content and function may account for individual presentations  Infantile Seborrheic Dermatitis  Infantile seborrheic dermatitis affects babies under the age of 1 months and usually resolves by 1012 months of age  Infantile seborrheic dermatitis causes \"cradle cap\" (diffuse, greasy scaling on scalp)  The rash may spread to affect armpit and groin folds (a type of \"napkin dermatitis\")  There may be associated salmon-pink colored patches that may flake or peel  The rash in this case is usually not especially itchy, so the baby often appears undisturbed by the rash, even when more generalized  Adult Seborrheic Dermatitis  Adult seborrheic dermatitis tends to begin in late adolescence; prevalence is greatest in young adults and in the elderly  It is more common in males than in females      The following factors are sometimes associated with severe adult seborrheic dermatitis:  • Oily skin  • Familial tendency to seborrhoeic dermatitis or a family history of psoriasis  • Immunosuppression: organ transplant recipient, human immunodeficiency virus (HIV) infection and patients with lymphoma  • Neurological and psychiatric diseases: Parkinson disease, tardive dyskinesia, depression, epilepsy, facial nerve palsy, spinal cord injury and congenital disorders such as Down syndrome  • Treatment for psoriasis with " psoralen and ultraviolet A (PUVA) therapy  • Lack of sleep  • Stressful events  In adults, seborrheic dermatitis may typically affect the scalp, face (creases around the nose, behind ears, within eyebrows) and upper trunk  Typical clinical features include:  • Winter flares, improving in summer following sun exposure  • Minimal itch most of the time  • Combination oily and dry mid-facial skin  • Ill-defined localized scaly patches or diffuse scale in the scalp  • Blepharitis; scaly red eyelid margins  • [place]send-pink, thin, scaly, and ill-defined plaques in skin folds on both sides of the face  • Petal or ring-shaped flaky patches on hair-line and on anterior chest  • Rash in armpits, under the breasts, in the groin folds and genital creases  • Superficial folliculitis (inflamed hair follicles) on cheeks and upper trunk    Seborrheic dermatitis is diagnosed by its clinical appearance and behavior  Skin biopsy may be helpful but is rarely necessary to make this diagnosis      Scribe Attestation    I,:  Kvng Rae am acting as a scribe while in the presence of the attending physician :       I,:  Brian Cruz MD personally performed the services described in this documentation    as scribed in my presence :

## 2023-04-07 NOTE — PATIENT INSTRUCTIONS
"  SEBORRHEIC DERMATITIS    Assessment and Plan:  Based on a thorough discussion of this condition and the management approach to it (including a comprehensive discussion of the known risks, side effects and potential benefits of treatment), the patient (family) agrees to implement the following specific plan:  Please start applying Ciclopirox 0 77% cream 2 times daily as needed for flares on the face and ears  Please start applying Ketoconazole 2 % shampoo daily for 2 weeks straight and then on \"Mondays, Wednesdays and Fridays\":  Lather into scalp; leave on for 5 minutes and then rinse off completely  Monitor for any changes      Seborrheic Dermatitis   Seborrheic dermatitis is a common, chronic or relapsing form of eczema/dermatitis that mainly affects the sebaceous, gland-rich regions of the scalp, face, and trunk  There are infantile and adult forms of seborrhoeic dermatitis  It is sometimes associated with psoriasis and, in that clinical scenario, may be referred to as \"sebo-psoriasis  \"  Seborrheic dermatitis is also known as \"seborrheic eczema  \"  Dandruff (also called \"pityriasis capitis\") is an uninflamed form of seborrhoeic dermatitis  Dandruff presents as bran-like scaly patches scattered within hair-bearing areas of the scalp  In an infant, this condition may be referred to as \"cradle cap  \"  The cause of seborrheic dermatitis is not completely understood  It is associated with proliferation of various species of the skin commensal Malassezia, in its yeast (non-pathogenic) form  Its metabolites (such as the fatty acids oleic acid, malssezin, and indole-3-carbaldehyde) may cause an inflammatory reaction  Differences in skin barrier lipid content and function may account for individual presentations  Infantile Seborrheic Dermatitis  Infantile seborrheic dermatitis affects babies under the age of 1 months and usually resolves by 1012 months of age    Infantile seborrheic dermatitis causes \"cradle cap\" " "(diffuse, greasy scaling on scalp)  The rash may spread to affect armpit and groin folds (a type of \"napkin dermatitis\")  There may be associated salmon-pink colored patches that may flake or peel  The rash in this case is usually not especially itchy, so the baby often appears undisturbed by the rash, even when more generalized  Adult Seborrheic Dermatitis  Adult seborrheic dermatitis tends to begin in late adolescence; prevalence is greatest in young adults and in the elderly  It is more common in males than in females  The following factors are sometimes associated with severe adult seborrheic dermatitis:  Oily skin  Familial tendency to seborrhoeic dermatitis or a family history of psoriasis  Immunosuppression: organ transplant recipient, human immunodeficiency virus (HIV) infection and patients with lymphoma  Neurological and psychiatric diseases: Parkinson disease, tardive dyskinesia, depression, epilepsy, facial nerve palsy, spinal cord injury and congenital disorders such as Down syndrome  Treatment for psoriasis with psoralen and ultraviolet A (PUVA) therapy  Lack of sleep  Stressful events  In adults, seborrheic dermatitis may typically affect the scalp, face (creases around the nose, behind ears, within eyebrows) and upper trunk  Typical clinical features include: Winter flares, improving in summer following sun exposure  Minimal itch most of the time  Combination oily and dry mid-facial skin  Ill-defined localized scaly patches or diffuse scale in the scalp  Blepharitis; scaly red eyelid margins  [place]ia-pink, thin, scaly, and ill-defined plaques in skin folds on both sides of the face  Petal or ring-shaped flaky patches on hair-line and on anterior chest  Rash in armpits, under the breasts, in the groin folds and genital creases  Superficial folliculitis (inflamed hair follicles) on cheeks and upper trunk    Seborrheic dermatitis is diagnosed by its clinical appearance and behavior   Skin " biopsy may be helpful but is rarely necessary to make this diagnosis

## 2023-05-04 DIAGNOSIS — L21.9 SEBORRHEIC DERMATITIS OF SCALP: ICD-10-CM

## 2023-05-04 RX ORDER — KETOCONAZOLE 20 MG/ML
SHAMPOO TOPICAL
Qty: 120 ML | Refills: 0 | Status: SHIPPED | OUTPATIENT
Start: 2023-05-04

## 2023-08-29 ENCOUNTER — OFFICE VISIT (OUTPATIENT)
Dept: BARIATRICS | Facility: CLINIC | Age: 53
End: 2023-08-29

## 2023-08-29 ENCOUNTER — TELEPHONE (OUTPATIENT)
Dept: FAMILY MEDICINE CLINIC | Facility: CLINIC | Age: 53
End: 2023-08-29

## 2023-08-29 VITALS
HEIGHT: 65 IN | TEMPERATURE: 96.9 F | SYSTOLIC BLOOD PRESSURE: 126 MMHG | BODY MASS INDEX: 40.65 KG/M2 | WEIGHT: 244 LBS | DIASTOLIC BLOOD PRESSURE: 80 MMHG | HEART RATE: 75 BPM

## 2023-08-29 DIAGNOSIS — E66.01 CLASS 2 SEVERE OBESITY WITH SERIOUS COMORBIDITY AND BODY MASS INDEX (BMI) OF 38.0 TO 38.9 IN ADULT, UNSPECIFIED OBESITY TYPE (HCC): Primary | ICD-10-CM

## 2023-08-29 DIAGNOSIS — K21.9 GASTROESOPHAGEAL REFLUX DISEASE, UNSPECIFIED WHETHER ESOPHAGITIS PRESENT: ICD-10-CM

## 2023-08-29 DIAGNOSIS — R73.01 IFG (IMPAIRED FASTING GLUCOSE): ICD-10-CM

## 2023-08-29 DIAGNOSIS — Z01.818 PRE-OPERATIVE CLEARANCE: ICD-10-CM

## 2023-08-29 DIAGNOSIS — E66.01 MORBID OBESITY (HCC): Primary | ICD-10-CM

## 2023-08-29 DIAGNOSIS — E66.01 OBESITY, CLASS III, BMI 40-49.9 (MORBID OBESITY) (HCC): Primary | ICD-10-CM

## 2023-08-29 DIAGNOSIS — E78.49 OTHER HYPERLIPIDEMIA: ICD-10-CM

## 2023-08-29 PROCEDURE — RECHECK: Performed by: DIETITIAN, REGISTERED

## 2023-08-29 NOTE — PROGRESS NOTES
Bariatric Behavioral Health Evaluation    Presenting Problem:   Susanna Torres  is a 50 y.o.   female    :  1970   Patient presented with overall concerns of obesity. Stated that weight has impacted quality of life and has current/ future concerns with lack of mobility,/ movement,  chronic pain, and overall health. Has attempted various weight loss plans in the past including Weight watchers, keto, watching calories. Patient is Interested in exploring bariatric surgery. as an option for  weight loss goals. Is the patient seeking Bariatric Surgery? YES  PCP referred her last year. Knows others who are post surgery . Neighbor is post with weight regain   Researched bariatric options      Realizes Post- Op Requirements? Yes, and will learn more meeting with the dietitians and social workers through the process     Pre-morbid level of function and history of present illness: Focused on weight loss for 13 yrs with minimal success. Knee/ankle pain. Hinders walking    GERD,  2DM,   Possible low vit D    Stressors and Supports:    is primary support. Family is supportive. 21 & 25 yr old sons supportive      Living situation:  and two adult sons. Can benefit with food changes in the home for their own weight loss. Son's girlfriend is having a baby today (will be moving in the home soon)    Eating habits:  May skip breakfast or lunch. Eats at work during break time. Work: [employer]:  S-W 12 hr shifts. . Mostly standing. Maybe 5K steps during work day. Physical Activity:  bowling weekly on . Walking is hindered by chronic pain in knees/ ankles. Family History (medical, traditions, culture, rules/routines around food):     Spouse  obesity and Children  obesity     Mental Health, Trauma and Substance use Assessment    Psychiatric/Psychological Treatment Diagnosis:    Anxiety in chart:  She states "situational"  Tries to be "carefree"     History of Eating Disorder:  None      Outpatient Counselor:  No      Psychiatrist :  No     Have you had any Mental Health Higher level of care (ED, PHP or Inpatient ) Treatment? No    Drug and/or Alcohol use and treatment history:  none     Have you had any Substance Use Treatment? No     Tobacco/Vaping History: quit year 2010  Patient agrees to remain nicotine free post surgery. Domestic Violence: No     Abuse or Trauma History: none     Risk Assessment    Identified support system intact. Risk of harm to self or others:  none noted during evaluation  No HI/SI      Presence of Audio/Visual Hallucinations: Not reported during evaluation     Access to weapons: Not reported during evaluation     Observation: this interview only (SW and RD will follow Elias Cooley through the bariatric program)     Based on the previous information, the client presents the following risk of harm to self or others:  Low risk        Physical/Mental Health Status:              Appearance: appropriate           Sociability: average           Affect: appropriate           Mood: calm           Thought Process: coherent           Speech: normal           Content: no impairment           Orientation: person  Yes , place  Yes , time  Yes , normal attention span  Yes , normal memory  Yes   and normal judgement  Yes            Insight: emotional  good       BARIATRIC SURGERY EDUCATION CHECKLIST    Patient has received the following education related to the bariatric surgery process and understands:    Patients may be required to complete a psychiatric evaluation and receive clearance for surgery from mental health provider. Patients who undergo weight loss surgery are at higher risk of increased mental health concerns and suicide attempts. Patients may be required to complete a full substance abuse evaluation and then complete all treatment recommendations prior to surgery.     If diagnosis of abuse/dependence results, patient may be required to remain sober for one (1) year before having bariatric surgery. Patients on psychiatric medications should check with their provider to discuss psychiatric medications and the changes in absorption. Patient should discuss all time release medications with provider and take all medications as prescribed. The recommendation is that there is no use of any tobacco products, Hookah or vapes for the bariatric post-operation patient. Bariatric surgery patients should not consume alcohol as a post-operative patient as it may increase risk of numerous health conditions including but not limited to alcohol abuse and ulcers. There is a possibility of weight regain if patient does not follow all program guidelines and recommendations. Bariatric surgery patients should exercise thirty (30) to sixty (60) minutes per day to maintain post-surgical weight loss. Research indicates that bariatric patients are more successful when they see a therapist for up to two (2) years post-op. Patients will follow all medical and dietary recommendations provided. Patient will keep all scheduled appointments and follow up with their physician for a minimum of five (5) years. Patient will take all vitamins as recommended. Post-operative vitamins are life-long. There is a goal month set. All requirements should be met by this time. Don't wait to get started! There is a deadline month set. All requirements must be finished by this time and if not, the patient will be halted in the surgery process. The patient can be referred to the medical weight management program or can come back to the surgical program once the unfinished tasks from the previous program are completed. Female patients of childbearing years are informed that pregnancy is not recommended until 12 - 18 months post-op.       Recommendations: Recommended for surgery  yes    Social Service Note:  Patient presented for behavioral health evaluation for the bariatric program.   No current Mental Health diagnosis. History of Anxiety. No reported history of therapy. No reported history of Psychiatry. No reported history of Drug and/or Alcohol abuse or treatment. No current tobacco use. Patient educated regarding the impact of nicotine and alcohol on the post surgery bariatric patient. Patient has a negative family history of tobacco and alcohol addiction. Patient has not reported contraindications for bariatric surgery. Patient will begin making changes with relationship with food through behavior modifications and mindful techniques. Tracking food intake for one week every three months can assist with weight maintenance and self accountability for post surgery success.  and Dietitian follow up visits are available for pre and post surgery support. Bariatric support group is available monthly. Patient verbalized the ability to start making changes and create a healthy relationship around nutrition. Patient meets criteria for surgery at this time and is referred to the bariatric surgeon.         Aaron Sofia LCSW

## 2023-08-29 NOTE — TELEPHONE ENCOUNTER
See orders. Please schedule overdue appt:    Return for Physical / 4mo - IFG, HL, Anxiety, Vit D Def, Obesity, Labs.               Future Appointments   Date Time Provider [address]   3/30/2023  8:40 AM Lei Elizabeth DO [facility]     Patient is overdue for labs Rx 11/25/22.

## 2023-08-29 NOTE — TELEPHONE ENCOUNTER
Patient needs a doctor to doctor order to have a bariatric consult, would you please put the order in so she can get the consultation done (Dr. Frank Parish)

## 2023-08-29 NOTE — PROGRESS NOTES
Bariatric Nutrition Assessment Note    Type of surgery    Preop  Surgery Date: TBD- no program requirement     Surgeon: Dr. Kale Seo  50 y.o.  female     Wt with BMI of 25: 148. 2  Pre-Op Excess Wt: 95.8 lbs   /80   Pulse 75   Temp (!) 96.9 °F (36.1 °C) (Tympanic)   Ht 5' 4.5" (1.638 m)   Wt 111 kg (244 lb)   BMI 41.24 kg/m²      NAFLD Fibrosis Score    Flowsheet Row Most Recent Value   Age (Years) 52 Years   BMI (kg/m2) 41.24   Impaired Fasting Glucose or Diabetes Yes   AST (U/L) 19   ALT (U/L) 20   Platelet Count 334   Albumin 4   NAFLD Score -0.366   Fibrosis Severity Indeterminant Score          Wt Readings from Last 3 Encounters:   04/07/23 106 kg (234 lb)   11/25/22 104 kg (229 lb 14.4 oz)   11/04/22 101 kg (221 lb 14.4 oz)       [facility] Equation:  1720  Estimated calories for weight loss 2568-1631 kcal per day  1/2 to 1# per wk wt loss - sedentary )  Estimated protein needs  grams per day (1.0-1.5 gms/kg IBW )   Estimated fluid needs 67- 79 ounces per day (30-35 ml/kg IBW )      Weight History   Onset of Obesity: Adult- 2010 after quitting smoking   Family history of obesity: Yes  Wt Loss Attempts: Commercial Programs ([email or web address], Mar Marten, etc.)  Fasting  High Protein/Low CHO diets (Atkins, [place], etc.)  Meal Replacements (Medifast, Slim Fast, etc.)  Self Created Diets (Portion Control, Healthy Food Choices, etc.)  Patient has tried the above for 6 months or more with insufficient weight loss or weight regain, which is why patient has requested to be evaluated for weight loss surgery today  Maximum Wt Lost: 30 pounds       Review of History and Medications   Past Medical History:   Diagnosis Date   • Arthritis    • Class 2 severe obesity with serious comorbidity and body mass index (BMI) of 39.0 to 39.9 in adult [facility])    • COVID-19 11/18/2020   • Diabetes mellitus ([address])     gestational   • Diverticulosis 11/2022   • Generalized anxiety disorder 2015   • GERD (gastroesophageal reflux disease)    • History of gestational diabetes ,    • IFG (impaired fasting glucose)    • Obesity    • Osteoarthritis     Osteoarthritis (Ankles & Knees)   • Other hyperlipidemia 2021   • Psoriasis 2015   • Scalp psoriasis 2021   • Vitamin D deficiency 2019     Past Surgical History:   Procedure Laterality Date   • ARTHROSCOPY ANKLE Right 2016    OA   • [facility] &    • CHOLECYSTECTOMY  2021   • [place] LAPAROSCOPY SURG CHOLECYSTECTOMY N/A 2021    Procedure: LAPAROSCOPIC POSSIBLE OPEN CHOLECYSTECTOMY;  Surgeon: Daryl Bal MD;  Location:  MAIN OR;  Service: General   • TONSILLECTOMY       Social History     Socioeconomic History   • Marital status: /Civil Union     Spouse name: Not on file   • Number of children: 2   • Years of education: Not on file   • Highest education level: Not on file   Occupational History   • Occupation: document control    Tobacco Use   • Smoking status: Former     Packs/day: 0.50     Years: 20.00     Total pack years: 10.00     Types: Cigarettes     Start date: 1986     Quit date: 2010     Years since quittin.6   • Smokeless tobacco: Never   Vaping Use   • Vaping Use: Never used   Substance and Sexual Activity   • Alcohol use:  Yes     Alcohol/week: 1.0 standard drink of alcohol     Types: 1 Glasses of wine per week     Comment: Very seldomly   • Drug use: Not Currently     Types: Marijuana     Comment: As a teenager   • Sexual activity: Yes     Partners: Male     Birth control/protection: Condom Male   Other Topics Concern   • Not on file   Social History Narrative        Lives , 2 sons    2 children - 2 sons     at [address] [place] [address] Strain: Not on file   Food Insecurity: Not on file   Transportation Needs: Not on file   Physical Activity: Not on file   Stress: Not on file   Social Connections: Not on file   Intimate Partner Violence: Not on file   Housing Stability: Not on file       Current Outpatient Medications:   •  ciclopirox (LOPROX) 0.77 % cream, Apply topically 2 (two) times a day Apply to affected areas of face and ears bid x 2-4 weeks as needed. , Disp: 15 g, Rfl: 2  •  clobetasol (OLUX) 0.05 % topical foam, Apply 1 application topically 2 (two) times a day as needed (Scalp Psoriasis) For Scalp Psoriasis. , Disp: 100 g, Rfl: 0  •  Diclofenac Sodium (VOLTAREN) 1 %, Apply 2 g topically 4 (four) times a day, Disp: 150 g, Rfl: 0  •  ketoconazole (NIZORAL) 2 % shampoo, Shampoo twice a week for 8 weeks, then as needed. Leave on for 5 minutes before rinsing., Disp: 120 mL, Rfl: 0  •  metFORMIN (GLUCOPHAGE-XR) 500 mg 24 hr tablet, Take 3 tablets (1,500 mg total) by mouth daily with breakfast Increase as directed., Disp: 90 tablet, Rfl: 1  •  selenium sulfide (SELSUN) 2.5 % shampoo, Apply daily x 1 week. Do not apply to inflamed or broken skin; lather, then rinse after 10 minutes. , Disp: 118 mL, Rfl: 0  •  triamcinolone (KENALOG) 0.1 % cream, Apply 1 application topically 2 (two) times a day as needed , Disp: , Rfl:      Does not currently take any vitamins or minerals     Food Intake and Lifestyle Assessment   Food Intake Assessment completed via usual diet recall  Works 4 12 hours shifts   Breakfast: 9:30 am - fruit parfait   Yogurt with fruit and granola   Snack: 0   Lunch: 1 pm - wrap - chicken or un crustables [employer] or ham and turkey wrap   Sometimes fruits   Snack: 0  Dinner: 7 pm - will order out   [employer], hamburger or cheeseburger, [employer], [employer]   When cooks chicken thighs with parmesan cheese with potatoes   Will cook 1-2 days per week   Snack: 0  Beverage intake: water  Protein supplement: none   Estimated protein intake per day: ~ 70-80 grams protein per day   Estimated fluid intake per day: 5 24 ounce bottles of water per day   Meals eaten ways to deal with them including: lactose intolerance, nausea, reflux, vomiting, diarrhea, food intolerance, appetite changes, gas  Vitamin / Mineral supplementation of Multivitamin with minerals, Calcium, Vitamin B12, Iron, Fat Soluble vitamins and Vitamin D  Patient is not currently pregnant and doesn't desire to become pregnant a minimum of one year post-op    Education provided to: patient and and spouse ( present and supportive )     Barriers to learning: No barriers identified    Readiness to change: preparation    Prior research on procedure: discussed with provider and pre-op class    Comprehension: needs reinforcement and verbalizes understanding     Expected Compliance: good  Recommendations  Pt is an appropriate candidate for surgery.  Yes    Evaluation / Monitoring  Dietitian to Monitor: Eating pattern as discussed Tolerance of nutrition prescription Body weight Lab values Physical activity Bowel pattern    Goals  Food journal, Complete lession plans 1-6, Eat 3 meals per day and track steps 6000 or  more per day   Recommend an adult multivitamin and mineral supplement  Vitamin D3 2000 IU per day   Pack lunch to decrease eating out /take out     Time Spent:   45 Minutes

## 2023-08-29 NOTE — PATIENT INSTRUCTIONS
Goals:  Recommend an adult multivitamin and mineral supplement  Vitamin D3 2000 IU per day   Pack lunch to decrease eating out   Track food on jack

## 2023-08-30 ENCOUNTER — LAB (OUTPATIENT)
Dept: LAB | Facility: AMBULARY SURGERY CENTER | Age: 53
End: 2023-08-30
Payer: COMMERCIAL

## 2023-08-30 DIAGNOSIS — R73.01 IFG (IMPAIRED FASTING GLUCOSE): ICD-10-CM

## 2023-08-30 DIAGNOSIS — E78.49 OTHER HYPERLIPIDEMIA: ICD-10-CM

## 2023-08-30 DIAGNOSIS — L40.9 SCALP PSORIASIS: ICD-10-CM

## 2023-08-30 DIAGNOSIS — K21.9 GASTROESOPHAGEAL REFLUX DISEASE, UNSPECIFIED WHETHER ESOPHAGITIS PRESENT: ICD-10-CM

## 2023-08-30 DIAGNOSIS — Z01.818 PRE-OPERATIVE CLEARANCE: ICD-10-CM

## 2023-08-30 DIAGNOSIS — L40.9 PSORIASIS: ICD-10-CM

## 2023-08-30 DIAGNOSIS — F41.1 GENERALIZED ANXIETY DISORDER: ICD-10-CM

## 2023-08-30 DIAGNOSIS — E66.01 CLASS 2 SEVERE OBESITY WITH SERIOUS COMORBIDITY AND BODY MASS INDEX (BMI) OF 38.0 TO 38.9 IN ADULT, UNSPECIFIED OBESITY TYPE (HCC): ICD-10-CM

## 2023-08-30 DIAGNOSIS — E66.01 OBESITY, CLASS III, BMI 40-49.9 (MORBID OBESITY) (HCC): ICD-10-CM

## 2023-08-30 DIAGNOSIS — E55.9 VITAMIN D DEFICIENCY: ICD-10-CM

## 2023-08-30 LAB
25(OH)D3 SERPL-MCNC: 15.3 NG/ML (ref 30–100)
ALBUMIN SERPL BCP-MCNC: 3.9 G/DL (ref 3.5–5)
ALP SERPL-CCNC: 84 U/L (ref 34–104)
ALT SERPL W P-5'-P-CCNC: 15 U/L (ref 7–52)
ANION GAP SERPL CALCULATED.3IONS-SCNC: 8 MMOL/L
AST SERPL W P-5'-P-CCNC: 16 U/L (ref 13–39)
BILIRUB SERPL-MCNC: 0.51 MG/DL (ref 0.2–1)
BUN SERPL-MCNC: 9 MG/DL (ref 5–25)
CALCIUM SERPL-MCNC: 9.6 MG/DL (ref 8.4–10.2)
CHLORIDE SERPL-SCNC: 105 MMOL/L (ref 96–108)
CHOLEST SERPL-MCNC: 180 MG/DL
CO2 SERPL-SCNC: 25 MMOL/L (ref 21–32)
CREAT SERPL-MCNC: 0.71 MG/DL (ref 0.6–1.3)
ERYTHROCYTE [DISTWIDTH] IN BLOOD BY AUTOMATED COUNT: 13.9 % (ref 11.6–15.1)
EST. AVERAGE GLUCOSE BLD GHB EST-MCNC: 117 MG/DL
GFR SERPL CREATININE-BSD FRML MDRD: 99 ML/MIN/1.73SQ M
GLUCOSE P FAST SERPL-MCNC: 90 MG/DL (ref 65–99)
HBA1C MFR BLD: 5.7 %
HCT VFR BLD AUTO: 37.5 % (ref 34.8–46.1)
HDLC SERPL-MCNC: 57 MG/DL
HGB BLD-MCNC: 12.2 G/DL (ref 11.5–15.4)
LDLC SERPL CALC-MCNC: 109 MG/DL (ref 0–100)
LDLC SERPL DIRECT ASSAY-MCNC: 119 MG/DL (ref 0–100)
MCH RBC QN AUTO: 29.8 PG (ref 26.8–34.3)
MCHC RBC AUTO-ENTMCNC: 32.5 G/DL (ref 31.4–37.4)
MCV RBC AUTO: 92 FL (ref 82–98)
NONHDLC SERPL-MCNC: 123 MG/DL
PLATELET # BLD AUTO: 301 THOUSANDS/UL (ref 149–390)
PMV BLD AUTO: 9.9 FL (ref 8.9–12.7)
POTASSIUM SERPL-SCNC: 4.1 MMOL/L (ref 3.5–5.3)
PROT SERPL-MCNC: 7 G/DL (ref 6.4–8.4)
RBC # BLD AUTO: 4.09 MILLION/UL (ref 3.81–5.12)
SODIUM SERPL-SCNC: 138 MMOL/L (ref 135–147)
TRIGL SERPL-MCNC: 71 MG/DL
TSH SERPL DL<=0.05 MIU/L-ACNC: 1.61 UIU/ML (ref 0.45–4.5)
WBC # BLD AUTO: 7.1 THOUSAND/UL (ref 4.31–10.16)

## 2023-08-30 PROCEDURE — 80061 LIPID PANEL: CPT

## 2023-08-30 PROCEDURE — 83721 ASSAY OF BLOOD LIPOPROTEIN: CPT

## 2023-08-30 PROCEDURE — 36415 COLL VENOUS BLD VENIPUNCTURE: CPT

## 2023-08-30 PROCEDURE — 85027 COMPLETE CBC AUTOMATED: CPT

## 2023-08-30 PROCEDURE — 80053 COMPREHEN METABOLIC PANEL: CPT

## 2023-08-30 PROCEDURE — 82306 VITAMIN D 25 HYDROXY: CPT

## 2023-08-30 PROCEDURE — 84443 ASSAY THYROID STIM HORMONE: CPT

## 2023-08-30 PROCEDURE — 83036 HEMOGLOBIN GLYCOSYLATED A1C: CPT

## 2023-08-30 NOTE — RESULT ENCOUNTER NOTE
Unstable labs - will review with patient at upcoming appointment. Vitamin D Deficiency - Recommend start multivitamin and prescription vitamin D (Drisdol). When 12 weeks of Drisdol completed, continue multivitamin and start over-the-counter Vitamin D3 1,000-3,000 International Units daily. Check vitamin D level 1 week after completing Drisdol.

## 2023-08-30 NOTE — TELEPHONE ENCOUNTER
Please update notes for 8/31/23 as previously requested. Return for Physical / 4mo - IFG, HL, Anxiety, Vit D Def, Obesity, Labs.

## 2023-08-31 ENCOUNTER — OFFICE VISIT (OUTPATIENT)
Dept: GASTROENTEROLOGY | Facility: CLINIC | Age: 53
End: 2023-08-31
Payer: COMMERCIAL

## 2023-08-31 ENCOUNTER — OFFICE VISIT (OUTPATIENT)
Dept: FAMILY MEDICINE CLINIC | Facility: CLINIC | Age: 53
End: 2023-08-31
Payer: COMMERCIAL

## 2023-08-31 ENCOUNTER — CONSULT (OUTPATIENT)
Dept: BARIATRICS | Facility: CLINIC | Age: 53
End: 2023-08-31
Payer: COMMERCIAL

## 2023-08-31 VITALS
BODY MASS INDEX: 40.57 KG/M2 | TEMPERATURE: 97.6 F | WEIGHT: 243.5 LBS | DIASTOLIC BLOOD PRESSURE: 70 MMHG | HEIGHT: 65 IN | SYSTOLIC BLOOD PRESSURE: 136 MMHG | HEART RATE: 77 BPM

## 2023-08-31 VITALS
RESPIRATION RATE: 20 BRPM | DIASTOLIC BLOOD PRESSURE: 76 MMHG | TEMPERATURE: 98.8 F | BODY MASS INDEX: 40.82 KG/M2 | HEIGHT: 65 IN | SYSTOLIC BLOOD PRESSURE: 118 MMHG | WEIGHT: 245 LBS | HEART RATE: 72 BPM

## 2023-08-31 VITALS
DIASTOLIC BLOOD PRESSURE: 80 MMHG | TEMPERATURE: 98.3 F | HEIGHT: 65 IN | WEIGHT: 245.6 LBS | SYSTOLIC BLOOD PRESSURE: 132 MMHG | BODY MASS INDEX: 40.92 KG/M2

## 2023-08-31 DIAGNOSIS — K21.9 GASTROESOPHAGEAL REFLUX DISEASE, UNSPECIFIED WHETHER ESOPHAGITIS PRESENT: ICD-10-CM

## 2023-08-31 DIAGNOSIS — Z12.31 ENCOUNTER FOR SCREENING MAMMOGRAM FOR BREAST CANCER: ICD-10-CM

## 2023-08-31 DIAGNOSIS — Z00.00 ANNUAL PHYSICAL EXAM: Primary | ICD-10-CM

## 2023-08-31 DIAGNOSIS — E66.01 CLASS 3 SEVERE OBESITY WITH SERIOUS COMORBIDITY AND BODY MASS INDEX (BMI) OF 40.0 TO 44.9 IN ADULT, UNSPECIFIED OBESITY TYPE (HCC): Primary | ICD-10-CM

## 2023-08-31 DIAGNOSIS — R73.01 IFG (IMPAIRED FASTING GLUCOSE): ICD-10-CM

## 2023-08-31 DIAGNOSIS — Z01.818 PRE-OPERATIVE EXAM: ICD-10-CM

## 2023-08-31 DIAGNOSIS — E78.49 OTHER HYPERLIPIDEMIA: ICD-10-CM

## 2023-08-31 DIAGNOSIS — E66.01 OBESITY, CLASS III, BMI 40-49.9 (MORBID OBESITY) (HCC): ICD-10-CM

## 2023-08-31 DIAGNOSIS — R76.8 POSITIVE ANA (ANTINUCLEAR ANTIBODY): ICD-10-CM

## 2023-08-31 DIAGNOSIS — F41.1 GENERALIZED ANXIETY DISORDER: ICD-10-CM

## 2023-08-31 DIAGNOSIS — M15.9 OSTEOARTHRITIS OF MULTIPLE JOINTS, UNSPECIFIED OSTEOARTHRITIS TYPE: ICD-10-CM

## 2023-08-31 DIAGNOSIS — Z23 ENCOUNTER FOR IMMUNIZATION: ICD-10-CM

## 2023-08-31 DIAGNOSIS — Z12.4 SCREENING FOR CERVICAL CANCER: ICD-10-CM

## 2023-08-31 DIAGNOSIS — K76.0 NAFLD (NONALCOHOLIC FATTY LIVER DISEASE): Primary | ICD-10-CM

## 2023-08-31 DIAGNOSIS — E55.9 VITAMIN D DEFICIENCY: ICD-10-CM

## 2023-08-31 DIAGNOSIS — L21.9 SEBORRHEIC DERMATITIS: ICD-10-CM

## 2023-08-31 DIAGNOSIS — E66.01 CLASS 3 SEVERE OBESITY WITH SERIOUS COMORBIDITY AND BODY MASS INDEX (BMI) OF 40.0 TO 44.9 IN ADULT, UNSPECIFIED OBESITY TYPE (HCC): ICD-10-CM

## 2023-08-31 DIAGNOSIS — R31.29 MICROSCOPIC HEMATURIA: ICD-10-CM

## 2023-08-31 DIAGNOSIS — Z12.11 SCREENING FOR COLON CANCER: ICD-10-CM

## 2023-08-31 PROBLEM — L40.9 SCALP PSORIASIS: Status: RESOLVED | Noted: 2021-06-04 | Resolved: 2023-08-31

## 2023-08-31 PROCEDURE — 99203 OFFICE O/P NEW LOW 30 MIN: CPT | Performed by: FAMILY MEDICINE

## 2023-08-31 PROCEDURE — 99213 OFFICE O/P EST LOW 20 MIN: CPT | Performed by: FAMILY MEDICINE

## 2023-08-31 PROCEDURE — 99204 OFFICE O/P NEW MOD 45 MIN: CPT | Performed by: SURGERY

## 2023-08-31 PROCEDURE — 99396 PREV VISIT EST AGE 40-64: CPT | Performed by: FAMILY MEDICINE

## 2023-08-31 RX ORDER — ERGOCALCIFEROL 1.25 MG/1
50000 CAPSULE ORAL WEEKLY
Qty: 12 CAPSULE | Refills: 0 | Status: SHIPPED | OUTPATIENT
Start: 2023-08-31 | End: 2023-11-17

## 2023-08-31 NOTE — ASSESSMENT & PLAN NOTE
Stable s statin. Recommend lifestyle modifications.     The 10-year ASCVD risk score (Jordan QURESHI, et al., 2019) is: 0.9%    Values used to calculate the score:      Age: 50 years      Sex: Female      Is Non- : No      Diabetic: No      Tobacco smoker: No      Systolic Blood Pressure: 529 mmHg      Is BP treated: No      HDL Cholesterol: 57 mg/dL      Total Cholesterol: 180 mg/dL

## 2023-08-31 NOTE — H&P (VIEW-ONLY)
BARIATRIC INITIAL CONSULT - BARIATRIC SURGERY    Arelis Morales 50 y.o. female MRN: [id number]  Unit/Bed#:  Encounter: [id number]      HPI:  Arelis Morales is a 50 y.o. female who presents with a longstanding history of morbid obesity and inability to sustain a meaningful weight loss. Here today to discuss bariatric options. Visit type: initial visit    Symptoms: excess weight and inability to loss weight    Associated Symptoms: depressed mood and anxiety    Associated Conditions: glucose intolerance, hyperlipidemia and abdominal obesity  Disease Complications: osteoarthritis and Prediabetes  Weight Loss Interest: high  Previous Diet Trials: low calorie     Exercise Frequency:infrequency  Types of Exercise: walking        Review of Systems   All other systems reviewed and are negative.       Historical Information   Past Medical History:   Diagnosis Date   • Arthritis    • Class 2 severe obesity with serious comorbidity and body mass index (BMI) of 39.0 to 39.9 in adult [facility])    • COVID-19 2020   • Diabetes mellitus ([address] [facility])     gestational   • Diverticulosis 2022   • Generalized anxiety disorder 2015   • GERD (gastroesophageal reflux disease)    • History of gestational diabetes ,    • IFG (impaired fasting glucose)    • Obesity    • Osteoarthritis     Osteoarthritis (Ankles & Knees)   • Other hyperlipidemia 2021   • Seborrheic dermatitis 2023   • Vitamin D deficiency 2019     Past Surgical History:   Procedure Laterality Date   • ARTHROSCOPY ANKLE Right 2016    OA   •  SECTION   &    • CHOLECYSTECTOMY  2021   • COLONOSCOPY     • [place] LAPAROSCOPY SURG CHOLECYSTECTOMY N/A 2021    Procedure: LAPAROSCOPIC POSSIBLE OPEN CHOLECYSTECTOMY;  Surgeon: Mariya Mason MD;  Location:  MAIN OR;  Service: General   • TONSILLECTOMY       Social History   Social History     Substance and Sexual Activity   Alcohol Use Yes   • Alcohol/week: 1.0 standard drink of alcohol   • Types: 1 Glasses of wine per week     Social History     Substance and Sexual Activity   Drug Use Not Currently   • Types: Marijuana     Social History     Tobacco Use   Smoking Status Former   • Packs/day: 0.50   • Years: 20.00   • Total pack years: 10.00   • Types: Cigarettes   • Start date: 1986   • Quit date: 2010   • Years since quittin.6   Smokeless Tobacco Never     Family History: non-contributory    Meds/Allergies   all medications and allergies reviewed  No Known Allergies    Objective       Current Vitals:   Blood Pressure: 136/70 (23 1605)  Pulse: 77 (23 1605)  Temperature: 97.6 °F (36.4 °C) (23 1605)  Temp Source: Tympanic (23 1605)  Height: 5' 4.5" (163.8 cm) (23 1605)  Weight - Scale: 110 kg (243 lb 8 oz) (23 160)        Invasive Devices     None                 Physical Exam  Vitals reviewed. Constitutional:       General: She is not in acute distress. Appearance: She is well-developed. She is not diaphoretic. HENT:      Head: Normocephalic and atraumatic. Right Ear: External ear normal.      Left Ear: External ear normal.      Nose: Nose normal.   Eyes:      General: No scleral icterus. Right eye: No discharge. Left eye: No discharge. Conjunctiva/sclera: Conjunctivae normal.   Neurological:      Mental Status: She is alert and oriented to person, place, and time. Psychiatric:         Behavior: Behavior normal.         Thought Content: Thought content normal.         Judgment: Judgment normal.         Lab Results: I have personally reviewed pertinent lab results. Imaging: I have personally reviewed pertinent reports. EKG, Pathology, and Other Studies: I have personally reviewed pertinent reports. Assessment/PLAN:    50 y.o. female with a long standing h/o of obesity and inability to sustain any meaningful weight loss on her own despite several attempts.     She is interested in the Laparoscopic sleeve gastrectomy. She had friends that have the procedure done in this is the one that she is interested in and the one that she has been reading about. I have discussed both operations with her and her  today. As a part of her pre op evaluation, she will be referred to a cardiologist and for a sleep evaluation and consult. She needs an EGD to evaluate the anatomy of her GI tract prior to the operation. I have spent over 30 minutes with her face to face in the office today discussing her options and details of the surgery. We have seen an animation of the surgery on the computer that illustrates how the operation is done and how the anatomy will be altered with the procedure. Over 50% of this was coordinating care. I have used the [facility] bariatric surgical risk/benefit calculator and we have discussed the results as part of the preop education. She was given the opportunity to ask questions and I have answered all of them. I have discussed and educated the patient with regards to the components of our multidisciplinary program and the importance of compliance and follow up in the post operative period. The patient was also instructed with regards to the importance of behavior modification, nutritional counseling, support meeting attendance and lifestyle changes that are important to ensure success. Although there is a great statistical chance of improvement or even resolution of most of her associated comorbidities, the results vary from patient to patient and they largely depend on her commitment and compliance. She needs to lose 5 lbs prior to the operation.       David Duke MD  8/31/2023  4:08 PM

## 2023-08-31 NOTE — PROGRESS NOTES
[facility] - Outpatient Consultation  Susanna Code 50 y.o. female MRN: [id number]  Encounter: [id number]          ASSESSMENT AND PLAN:      1. Pre-operative exam  2. Obesity, Class III, BMI 40-49.9 (morbid obesity) ([address])  3. NAFLD (nonalcoholic fatty liver disease)  Patient was referred by bariatrics for a pre-operative assessment due to an elevated NAFLD fibrosis score. Patient has multiple metabolic risk factors for the development of NAFLD and a prior RUQ US was notable for a fatty appearing liver. Hepatic function historically within normal limits. Prior HCV Ab negative. No clinical evidence of chronic liver disease. Discussed the pathophysiology of fatty liver disease, and patient is aware of the potential to progress to cirrhosis over time if left untreated. Ordered serologies screening for viral hepatitis - patient was previously screened for HCV and will screen for HBV - and to determine patient's immunity status to hep A and B. Patient will also complete a RUQ U/S with elastography to help quantify steatosis and better assess for advanced fibrosis. Patient will continue following with bariatric surgery for assistance with weight loss. Also recommended strict control of contributing comorbidities and limiting alcohol consumption. Barring the discovery of advanced fibrosis, patient may follow-up with hepatology clinic on an as-needed basis. - Ambulatory Referral to Hepatology  - US right upper quadrant; Future  - US elastography/UGAP; Future  - Hepatitis B core antibody, total; Future  - Hepatitis B surface antibody; Future  - Hepatitis B surface antigen; Future  - Hepatitis A antibody, total; Future    4. Gastroesophageal reflux disease, unspecified whether esophagitis present  Patient reports infrequent GERD, approximately once monthly and largely diet related, for which she does not use any over-the-counter or Rx medications.   Also denies alarm features. For intermittent reflux, recommended the use of over-the-counter Tums or Pepcid as needed. Also discussed dietary/lifestyle modifications to help prevent reflux.    - Ambulatory Referral to Hepatology    5. Screening for colon cancer  Patient is up-to-date with CRC screening. Colonoscopy (11/4/2022) with few scattered left-sided diverticula. Recommended repeat colonoscopy x10 years. Follow-up PRN pending labs, U/S and U/S elastography.     ______________________________________________________________________    HPI: Patient is a 50 y.o. female with PMH significant for BCC, anxiety, psoriasis, osteoarthritis, IFG, HLD, GERD and vitamin D deficiency who presents today for a consult regarding an elevated NAFLD fibrosis score. Patient is currently following with weight management and plans to be seen for consideration of bariatric surgery. She was noted to have an intermittent NAFLD fibrosis score for which she was referred to hepatology for further evaluation. Per chart review, RUQ US (4/2021) was notable for fatty infiltration of the liver. Hepatic function historically within normal limits. Normal platelet count without hypoalbuminemia. Prior hepatitis C antibody negative (6/2021). In regards to metabolic risk factors, patient's BMI is 41.24 with hyperlipidemia and impaired fasting glucose. Admits to rare alcohol use. Denies tobacco use. Denies a personal history of liver disease. Denies a family history of liver disease or liver cancer. Denies a known past or current infection with viral hepatitis. She was also referred for GERD, but reports rare heartburn/reflux approximately once monthly and largely diet related. She does not take medication for this. Denies dysphagia/odynophagia, early satiety, nausea/vomiting, epigastric abdominal pain or melena. She is up-to-date with her CRC screening.       REVIEW OF SYSTEMS:    CONSTITUTIONAL: Denies any fever, chills, rigors, and weight loss.  HEENT: No earache or tinnitus. Denies hearing loss or visual disturbances. CARDIOVASCULAR: No chest pain or palpitations. RESPIRATORY: Denies any cough, hemoptysis, shortness of breath or dyspnea on exertion. GASTROINTESTINAL: As noted in the History of Present Illness. GENITOURINARY: No problems with urination. Denies any hematuria or dysuria. NEUROLOGIC: No dizziness or vertigo, denies headaches. MUSCULOSKELETAL: Denies any muscle or joint pain. SKIN: Denies skin rashes or itching. ENDOCRINE: Denies excessive thirst. Denies intolerance to heat or cold. PSYCHOSOCIAL: Denies depression or anxiety. Denies any recent memory loss.        Historical Information   Past Medical History:   Diagnosis Date   • Arthritis    • Class 2 severe obesity with serious comorbidity and body mass index (BMI) of 39.0 to 39.9 in adult [facility])    • COVID-19 11/18/2020   • Diabetes mellitus ([address] [facility])     gestational   • Diverticulosis 11/2022   • Generalized anxiety disorder 04/13/2015   • GERD (gastroesophageal reflux disease)    • History of gestational diabetes 1999, 2003   • IFG (impaired fasting glucose)    • Obesity    • Osteoarthritis 2015    Osteoarthritis (Ankles & Knees)   • Other hyperlipidemia 06/03/2021   • Psoriasis 01/01/2015   • Scalp psoriasis 06/04/2021   • Vitamin D deficiency 09/25/2019     Past Surgical History:   Procedure Laterality Date   • ARTHROSCOPY ANKLE Right 2016    OA   • 2 [facility] & 2003   • CHOLECYSTECTOMY  July 2021   • COLONOSCOPY     • NE LAPAROSCOPY SURG CHOLECYSTECTOMY N/A 07/08/2021    Procedure: LAPAROSCOPIC POSSIBLE OPEN CHOLECYSTECTOMY;  Surgeon: Alec Verde MD;  Location:  MAIN OR;  Service: General   • TONSILLECTOMY       Social History   Social History     Substance and Sexual Activity   Alcohol Use Yes   • Alcohol/week: 1.0 standard drink of alcohol   • Types: 1 Glasses of wine per week    Comment: Very seldomly     Social History     Substance and Sexual Activity   Drug Use Not Currently   • Types: Marijuana    Comment: As a teenager     Social History     Tobacco Use   Smoking Status Former   • Packs/day: 0.50   • Years: 20.00   • Total pack years: 10.00   • Types: Cigarettes   • Start date: 1986   • Quit date: 2010   • Years since quittin.6   Smokeless Tobacco Never     Family History   Problem Relation Age of Onset   • Diabetes Father 36        Type Unknown   • Heart attack Father 32   • Coronary artery disease Father 32   • Heart disease Father    • Heart defect Mother         R to L Shunt   • Muscular dystrophy Mother 32   • Heart disease Mother    • No Known Problems Brother    • No Known Problems Son    • Diabetes Maternal Grandmother    • Kidney disease Maternal Grandmother    • No Known Problems Maternal Grandfather    • Diabetes Paternal Grandmother    • Heart disease Paternal Grandmother    • Diabetes Paternal Grandfather    • Heart disease Paternal Grandfather    • No Known Problems Son        Meds/Allergies       Current Outpatient Medications:   •  ciclopirox (LOPROX) 0.77 % cream  •  Diclofenac Sodium (VOLTAREN) 1 %  •  ketoconazole (NIZORAL) 2 % shampoo  •  clobetasol (OLUX) 0.05 % topical foam  •  metFORMIN (GLUCOPHAGE-XR) 500 mg 24 hr tablet  •  selenium sulfide (SELSUN) 2.5 % shampoo  •  triamcinolone (KENALOG) 0.1 % cream    No Known Allergies        Objective     Blood pressure 132/80, temperature 98.3 °F (36.8 °C), temperature source Tympanic, height 5' 4.5" (1.638 m), weight 111 kg (245 lb 9.6 oz), not currently breastfeeding. Body mass index is 41.51 kg/m². PHYSICAL EXAM:      General Appearance:   Alert, cooperative, no distress   HEENT:   Normocephalic, atraumatic, anicteric. Neck:  Supple, symmetrical, trachea midline   Lungs:   Clear to auscultation bilaterally; no rales, rhonchi or wheezing; respirations unlabored    Heart[de-identified]   Regular rate and rhythm; no murmur, rub, or gallop.    Abdomen:   Soft, non-tender, non-distended; normal bowel sounds; no masses, no organomegaly    Genitalia:   Deferred    Rectal:   Deferred    Extremities:  No cyanosis, clubbing or edema    Pulses:  2+ and symmetric    Skin:  No jaundice, rashes, or lesions    Lymph nodes:  No palpable cervical lymphadenopathy        Lab Results:   No visits with results within 1 Day(s) from this visit. Latest known visit with results is:   Lab on 08/30/2023   Component Date Value   • WBC 08/30/2023 7.10    • RBC 08/30/2023 4.09    • Hemoglobin 08/30/2023 12.2    • Hematocrit 08/30/2023 37.5    • MCV 08/30/2023 92    • MCH 08/30/2023 29.8    • MCHC 08/30/2023 32.5    • RDW 08/30/2023 13.9    • Platelets 36/73/3205 301    • MPV 08/30/2023 9.9    • Sodium 08/30/2023 138    • Potassium 08/30/2023 4.1    • Chloride 08/30/2023 105    • CO2 08/30/2023 25    • ANION GAP 08/30/2023 8    • BUN 08/30/2023 9    • Creatinine 08/30/2023 0.71    • Glucose, Fasting 08/30/2023 90    • Calcium 08/30/2023 9.6    • AST 08/30/2023 16    • ALT 08/30/2023 15    • Alkaline Phosphatase 08/30/2023 84    • Total Protein 08/30/2023 7.0    • Albumin 08/30/2023 3.9    • Total Bilirubin 08/30/2023 0.51    • eGFR 08/30/2023 99    • Cholesterol 08/30/2023 180    • Triglycerides 08/30/2023 71    • HDL, Direct 08/30/2023 57    • LDL Calculated 08/30/2023 109 (H)    • Non-HDL-Chol (CHOL-HDL) 08/30/2023 123    • TSH 3RD GENERATON 08/30/2023 1.607    • Hemoglobin A1C 08/30/2023 5.7 (H)    • EAG 08/30/2023 117    • Vit D, 25-Hydroxy 08/30/2023 15.3 (L)    • LDL Direct 08/30/2023 119 (H)          Radiology Results:   No results found. Vickii Gavel, PA-C     **Please note: Dictation voice to text software may have been used in the creation of this record. Occasional wrong word or “sound alike” substitutions may have occurred due to the inherent limitations of voice recognition software. Read the chart carefully and recognize, using context, where substitutions have occurred. **

## 2023-08-31 NOTE — ASSESSMENT & PLAN NOTE
Recurrent. Vitamin D Deficiency - Recommend start multivitamin and prescription vitamin D (Drisdol). When 12 weeks of Drisdol completed, continue multivitamin and start over-the-counter Vitamin D3 1,000-3,000 International Units daily. Check vitamin D level 1 week after completing Drisdol.

## 2023-08-31 NOTE — PROGRESS NOTES
Assessment/Plan:  Problem List Items Addressed This Visit        Digestive    GERD (gastroesophageal reflux disease)     Management per GI. Stable s meds. Watch GERD diet. Endocrine    IFG (impaired fasting glucose)     Improved. Patient coud not tolerate Metformin XR 500mg 2 pills  QD due to diarrhea. Recommend lifestyle modifications. Relevant Orders    Hemoglobin A1C       Musculoskeletal and Integument    Osteoarthritis     Unstable on Voltaren gel and pills. Weight loss advised. Seborrheic dermatitis     Management per Derm. Genitourinary    Microscopic hematuria     Resolved. S/p U/A 6/4/21. Other    Vitamin D deficiency     Recurrent. Vitamin D Deficiency - Recommend start multivitamin and prescription vitamin D (Drisdol). When 12 weeks of Drisdol completed, continue multivitamin and start over-the-counter Vitamin D3 1,000-3,000 International Units daily. Check vitamin D level 1 week after completing Drisdol. Relevant Medications    ergocalciferol (ERGOCALCIFEROL) 1.25 MG (11380 UT) capsule    Other Relevant Orders    Vitamin D 25 hydroxy    Comprehensive metabolic panel    Vitamin D 25 hydroxy    Generalized anxiety disorder     Stable without mood medications or counseling. Relevant Orders    TSH, 3rd generation with Free T4 reflex    Other hyperlipidemia     Stable s statin. Recommend lifestyle modifications.     The 10-year ASCVD risk score (Jordan QURESHI, et al., 2019) is: 0.9%    Values used to calculate the score:      Age: 50 years      Sex: Female      Is Non- : No      Diabetic: No      Tobacco smoker: No      Systolic Blood Pressure: 309 mmHg      Is BP treated: No      HDL Cholesterol: 57 mg/dL      Total Cholesterol: 180 mg/dL           Relevant Orders    Comprehensive metabolic panel    Lipid panel    TSH, 3rd generation with Free T4 reflex    LDL cholesterol, direct    Class 3 severe obesity with serious comorbidity and body mass index (BMI) of 40.0 to 44.9 in adult ([address])     Stable. Recommend lifestyle modifications. Management per weight management - considering bariatric surgery. Relevant Orders    TSH, 3rd generation with Free T4 reflex    Positive HANH (antinuclear antibody)     Repeat HANH is negative. Other Visit Diagnoses     Annual physical exam    -  Primary    Health Maintenance   Topic Date Due   • Cervical Cancer Screening  Never done   • Breast Cancer Screening: Mammogram  Never done   • Influenza Vaccine (1) 09/01/2023   • COVID-19 Vaccine (2 - Booster for [place] series) 08/31/2024 (Originally 11/24/2021)   • DTaP,Tdap,and Td Vaccines (2 - Td or Tdap) 04/03/2024   • BMI: Followup Plan  08/29/2024   • Depression Screening  08/31/2024   • BMI: Adult  08/31/2024   • Annual Physical  08/31/2024   • Colorectal Cancer Screening  11/01/2032   • HIV Screening  Completed   • Hepatitis C Screening  Completed   • Pneumococcal Vaccine: Pediatrics (0 to 5 Years) and At-Risk Patients (6 to 59 Years)  Aged Out   • HIB Vaccine  Aged Out   • IPV Vaccine  Aged Out   • Hepatitis A Vaccine  Aged Out   • Meningococcal ACWY Vaccine  Aged Out   • HPV Vaccine  Aged Out         Encounter for screening mammogram for breast cancer        Relevant Orders    Mammo screening bilateral w 3d & cad    Screening for cervical cancer        Relevant Orders    Ambulatory Referral to Gynecology    Encounter for immunization        Relevant Orders    Zoster Vaccine Recombinant IM    Zoster Vaccine Recombinant IM           Return in about 6 months (around 2/29/2024) for 6mo - IFG, HL, Anxiety, Vit D Def, Obesity, Labs; PRN Shingrix #1; 2mo later - Shingrix #2.       Future Appointments   Date Time Provider [address]   8/31/2023  4:00 PM MD FLEX Galan [facility]   9/2/2023 10:00 AM ROSITA [address]   9/2/2023 10:30 AM ROSITA [address]   9/29/2023 10:00 AM Williams Sauceda RD [facility]GT [facility]   2023  1:00 PM Bailey Mcdaniel MD [facility]   2024  9:20 AM Lois Galvez DO [facility]        Subjective:     Kelley Conway is a 50 y.o. female who presents today for a follow-up on her chronic medical conditions. HPI:  Chief Complaint   Patient presents with   • Physical Exam   • Obesity     Has appt with general surg today for gastric sleeve     -- Above per clinical staff and reviewed. --      HPI      Today:      Return for 4mo - IFG, HL, Anxiety, Vit D Def, Obesity, Labs. 4mo OV - Overdue     Morbid Obesity - Management per Weight [address] - next appt . Not watching diet.  No regular exercise.  Active at work in [employer].      IFG / H/O GDM - Diet-controlled. She stopped taking Metformin XR 500mg 2 pills daily due to diarrhea.         Hyperlipidemia - No statin previously.       GERD / Fatty Liver -  Management per GI HANANE Davis-C - next appt PRN W/U - RUQ U/S c elastography, Hep A/B testing for immunity. Stable, except for late night eating.  No longer uses OTC acid reducer PRN.  Improved s/p Laparoscopic cholecystectomy on 2021 per Dr. Sifuentes Human diarrhea after eating. Last colonoscopy 24, repeat in 10 years.        OA - Knees and Ankles - Management per Podiatry  - [facility].  Next appt PRN.  Stable on Voltaren gel and PO.   Advised to attend PT to prevent recurrent ankle sprain.       Anxiety -  Patient states mood is stable.  Good social supports.  No SI/HI/AH/VH.  No mood meds or counseling previously.          PHQ-2/9 Depression Screening    Little interest or pleasure in doing things: 0 - not at all  Feeling down, depressed, or hopeless: 0 - not at all  Trouble falling or staying asleep, or sleeping too much: 0 - not at all  Feeling tired or having little energy: 1 - several days  Poor appetite or overeatin - not at all  Feeling bad about yourself - or that you are a failure or have let yourself or your family down: 0 - not at all  Trouble concentrating on things, such as reading the newspaper or watching television: 0 - not at all  Moving or speaking so slowly that other people could have noticed. Or the opposite - being so fidgety or restless that you have been moving around a lot more than usual: 0 - not at all  Thoughts that you would be better off dead, or of hurting yourself in some way: 0 - not at all  PHQ-2 Score: 0  PHQ-2 Interpretation: Negative depression screen  PHQ-9 Score: 1   PHQ-9 Interpretation: No or Minimal depression          GRACIELA-7 Flowsheet Screening    Flowsheet Row Most Recent Value   Over the last 2 weeks, how often have you been bothered by any of the following problems? Feeling nervous, anxious, or on edge 0   Not being able to stop or control worrying 0   Worrying too much about different things 0   Trouble relaxing 0   Being so restless that it is hard to sit still 0   Becoming easily annoyed or irritable 0   Feeling afraid as if something awful might happen 0   GRACIELA-7 Total Score 0           Vitamin D Deficiency - s/p Drisdol.  No MVI and OTC Vitamin currently.        +HANH - No Rheum consult previously.  Negative repeat HANH 6/4/21.      Seborrheic Dermatitis - Management per Derm Dr. Jonathon Can - Next appt? On Ketoconazole and Ciclospirox.           Microscopic Hematuria - No Uro evaluation previously.  Stable U/A 6/4/21.         Reviewed:  Labs 8/30/23, GI 8/31/23, Weight Management 8/29/23, Derm 4/7/23     No Gyn.  Last saw Gyn years ago. Nannette Chao abnormal Paps in the past.          Sees Dentist q6 months. Sees Optho q2 years.       The following portions of the patient's history were reviewed and updated as appropriate: allergies, current medications, past family history, past medical history, past social history, past surgical history and problem list.      Review of Systems   Constitutional: Negative for appetite change, chills, diaphoresis, fatigue and fever. Respiratory: Negative for chest tightness and shortness of breath. Cardiovascular: Negative for chest pain. Gastrointestinal: Negative for abdominal pain, blood in stool, diarrhea, nausea and vomiting. Genitourinary: Negative for dysuria. Musculoskeletal: Positive for arthralgias. Current Outpatient Medications   Medication Sig Dispense Refill   • ciclopirox (LOPROX) 0.77 % cream Apply topically 2 (two) times a day Apply to affected areas of face and ears bid x 2-4 weeks as needed. (Patient taking differently: Apply topically 2 (two) times a day Apply to affected areas of face and ears bid x 2-4 weeks as needed. Rx per Derm.) 15 g 2   • Diclofenac Sodium (VOLTAREN) 1 % Apply 2 g topically 4 (four) times a day 150 g 0   • ergocalciferol (ERGOCALCIFEROL) 1.25 MG (31348 UT) capsule Take 1 capsule (50,000 Units total) by mouth once a week for 12 doses 12 capsule 0   • ketoconazole (NIZORAL) 2 % shampoo Shampoo twice a week for 8 weeks, then as needed. Leave on for 5 minutes before rinsing. (Patient taking differently: Shampoo twice a week for 8 weeks, then as needed. Leave on for 5 minutes before rinsing. Rx per Derm.) 120 mL 0     No current facility-administered medications for this visit. Objective:  /76 (BP Location: Left arm, Patient Position: Sitting, Cuff Size: Large)   Pulse 72   Temp 98.8 °F (37.1 °C) (Temporal)   Resp 20   Ht 5' 5" (1.651 m)   Wt 111 kg (245 lb)   BMI 40.77 kg/m²    Wt Readings from Last 3 Encounters:   08/31/23 111 kg (245 lb)   08/31/23 111 kg (245 lb 9.6 oz)   08/29/23 111 kg (244 lb)      BP Readings from Last 3 Encounters:   08/31/23 118/76   08/31/23 132/80   08/29/23 126/80          Physical Exam  Vitals and nursing note reviewed. Constitutional:       Appearance: Normal appearance. She is well-developed. HENT:      Head: Normocephalic and atraumatic.       Right Ear: Tympanic membrane, ear canal and external ear normal.      Left Ear: Tympanic membrane, ear canal and external ear normal.      Nose: Nose normal.      Right Sinus: No maxillary sinus tenderness or frontal sinus tenderness. Left Sinus: No maxillary sinus tenderness or frontal sinus tenderness. Mouth/Throat:      Mouth: Mucous membranes are moist.      Pharynx: Oropharynx is clear. Uvula midline. Tonsils: No tonsillar exudate. Eyes:      Extraocular Movements: Extraocular movements intact. Conjunctiva/sclera: Conjunctivae normal.      Pupils: Pupils are equal, round, and reactive to light. Cardiovascular:      Rate and Rhythm: Normal rate and regular rhythm. Pulses: Normal pulses. Heart sounds: Normal heart sounds. Pulmonary:      Effort: Pulmonary effort is normal.      Breath sounds: Normal breath sounds. Abdominal:      General: Bowel sounds are normal. There is no distension. Palpations: Abdomen is soft. There is no mass. Tenderness: There is no abdominal tenderness. There is no guarding or rebound. Musculoskeletal:         General: No swelling or tenderness. Cervical back: Neck supple. Right lower leg: No edema. Left lower leg: No edema. Lymphadenopathy:      Cervical: No cervical adenopathy. Skin:     Findings: No rash. Neurological:      General: No focal deficit present. Mental Status: She is alert and oriented to person, place, and time. Psychiatric:         Mood and Affect: Mood normal.         Behavior: Behavior normal.         Thought Content:  Thought content normal.         Judgment: Judgment normal.         Lab Results:      Lab Results   Component Value Date    WBC 7.10 08/30/2023    HGB 12.2 08/30/2023    HCT 37.5 08/30/2023     08/30/2023    TRIG 71 08/30/2023    HDL 57 08/30/2023    LDLDIRECT 119 (H) 08/30/2023    ALT 15 08/30/2023    AST 16 08/30/2023    K 4.1 08/30/2023     08/30/2023    CREATININE 0.71 08/30/2023    BUN 9 08/30/2023    CO2 25 08/30/2023    GLUF 90 08/30/2023    HGBA1C 5.7 (H) 08/30/2023     Lab Results   Component Value Date    URICACID 5.9 06/04/2021     Invalid input(s): "BASENAME" Vitamin D    No results found.      POCT Labs

## 2023-08-31 NOTE — ASSESSMENT & PLAN NOTE
Improved. Patient coud not tolerate Metformin XR 500mg 2 pills  QD due to diarrhea. Recommend lifestyle modifications.

## 2023-08-31 NOTE — PROGRESS NOTES
BARIATRIC INITIAL CONSULT - BARIATRIC SURGERY    Ajit Gill 50 y.o. female MRN: [id number]  Unit/Bed#:  Encounter: [id number]      HPI:  Ajit Gill is a 50 y.o. female who presents with a longstanding history of morbid obesity and inability to sustain a meaningful weight loss. Here today to discuss bariatric options. Visit type: initial visit    Symptoms: excess weight and inability to loss weight    Associated Symptoms: depressed mood and anxiety    Associated Conditions: glucose intolerance, hyperlipidemia and abdominal obesity  Disease Complications: osteoarthritis and Prediabetes  Weight Loss Interest: high  Previous Diet Trials: low calorie     Exercise Frequency:infrequency  Types of Exercise: walking        Review of Systems   All other systems reviewed and are negative.       Historical Information   Past Medical History:   Diagnosis Date   • Arthritis    • Class 2 severe obesity with serious comorbidity and body mass index (BMI) of 39.0 to 39.9 in adult [facility])    • COVID-19 2020   • Diabetes mellitus ([address] [facility])     gestational   • Diverticulosis 2022   • Generalized anxiety disorder 2015   • GERD (gastroesophageal reflux disease)    • History of gestational diabetes ,    • IFG (impaired fasting glucose)    • Obesity    • Osteoarthritis     Osteoarthritis (Ankles & Knees)   • Other hyperlipidemia 2021   • Seborrheic dermatitis 2023   • Vitamin D deficiency 2019     Past Surgical History:   Procedure Laterality Date   • ARTHROSCOPY ANKLE Right 2016    OA   •  SECTION   &    • CHOLECYSTECTOMY  2021   • COLONOSCOPY     • NC LAPAROSCOPY SURG CHOLECYSTECTOMY N/A 2021    Procedure: LAPAROSCOPIC POSSIBLE OPEN CHOLECYSTECTOMY;  Surgeon: Batsheva Dias MD;  Location:  MAIN OR;  Service: General   • TONSILLECTOMY       Social History   Social History     Substance and Sexual Activity   Alcohol Use Yes   • Alcohol/week: 1.0 standard drink of alcohol   • Types: 1 Glasses of wine per week     Social History     Substance and Sexual Activity   Drug Use Not Currently   • Types: Marijuana     Social History     Tobacco Use   Smoking Status Former   • Packs/day: 0.50   • Years: 20.00   • Total pack years: 10.00   • Types: Cigarettes   • Start date: 1986   • Quit date: 2010   • Years since quittin.6   Smokeless Tobacco Never     Family History: non-contributory    Meds/Allergies   all medications and allergies reviewed  No Known Allergies    Objective       Current Vitals:   Blood Pressure: 136/70 (23 1605)  Pulse: 77 (23 1605)  Temperature: 97.6 °F (36.4 °C) (23 1605)  Temp Source: Tympanic (23 1605)  Height: 5' 4.5" (163.8 cm) (23 1605)  Weight - Scale: 110 kg (243 lb 8 oz) (23 160)        Invasive Devices     None                 Physical Exam  Vitals reviewed. Constitutional:       General: She is not in acute distress. Appearance: She is well-developed. She is not diaphoretic. HENT:      Head: Normocephalic and atraumatic. Right Ear: External ear normal.      Left Ear: External ear normal.      Nose: Nose normal.   Eyes:      General: No scleral icterus. Right eye: No discharge. Left eye: No discharge. Conjunctiva/sclera: Conjunctivae normal.   Neurological:      Mental Status: She is alert and oriented to person, place, and time. Psychiatric:         Behavior: Behavior normal.         Thought Content: Thought content normal.         Judgment: Judgment normal.         Lab Results: I have personally reviewed pertinent lab results. Imaging: I have personally reviewed pertinent reports. EKG, Pathology, and Other Studies: I have personally reviewed pertinent reports. Assessment/PLAN:    50 y.o. female with a long standing h/o of obesity and inability to sustain any meaningful weight loss on her own despite several attempts.     She is interested in the Laparoscopic sleeve gastrectomy. She had friends that have the procedure done in this is the one that she is interested in and the one that she has been reading about. I have discussed both operations with her and her  today. As a part of her pre op evaluation, she will be referred to a cardiologist and for a sleep evaluation and consult. She needs an EGD to evaluate the anatomy of her GI tract prior to the operation. I have spent over 30 minutes with her face to face in the office today discussing her options and details of the surgery. We have seen an animation of the surgery on the computer that illustrates how the operation is done and how the anatomy will be altered with the procedure. Over 50% of this was coordinating care. I have used the [facility] bariatric surgical risk/benefit calculator and we have discussed the results as part of the preop education. She was given the opportunity to ask questions and I have answered all of them. I have discussed and educated the patient with regards to the components of our multidisciplinary program and the importance of compliance and follow up in the post operative period. The patient was also instructed with regards to the importance of behavior modification, nutritional counseling, support meeting attendance and lifestyle changes that are important to ensure success. Although there is a great statistical chance of improvement or even resolution of most of her associated comorbidities, the results vary from patient to patient and they largely depend on her commitment and compliance. She needs to lose 5 lbs prior to the operation.       Luzmaria Enriquez MD  8/31/2023  4:08 PM

## 2023-08-31 NOTE — PROGRESS NOTES
Received updated blood work. NAFLD Fibrosis Score is: -. 294    NAFLD Score Correlated Fibrosis Severity   <-1.455 F0-F2   -1.455-0.676 Indeterminate Score   >0.676 F3-F4   **Fibrosis Severity Scale: F0 = no fibrosis; F1= mild fibrosis; F2 = moderate fibrosis; F3 = severe fibrosis; F4 = cirrhosis    NAFLD Score Component Values:  Component Value Date   Age: 50 y.o.     BMI: 40.77 kg/m²    IFG or DM:  Yes    AST: 16 U/L 8/30/2023   ALT: 15 U/L 8/30/2023   Platelet: 037 Thousands/uL 8/30/2023   Albumin: 3.9 g/dL 8/30/2023

## 2023-08-31 NOTE — ASSESSMENT & PLAN NOTE
Stable. Recommend lifestyle modifications. Management per weight management - considering bariatric surgery.

## 2023-08-31 NOTE — PATIENT INSTRUCTIONS
Vitamin D Deficiency - Recommend start multivitamin and prescription vitamin D (Drisdol). When 12 weeks of Drisdol completed, continue multivitamin and start over-the-counter Vitamin D3 1,000-3,000 International Units daily. Check vitamin D level 1 week after completing Drisdol. Wellness Visit for Adults   AMBULATORY CARE:   A wellness visit  is when you see your healthcare provider to get screened for health problems. Your healthcare provider will also give you advice on how to stay healthy. Write down your questions so you remember to ask them. Ask your healthcare provider how often you should have a wellness visit. What happens at a wellness visit:  Your healthcare provider will ask about your health, and your family history of health problems. This includes high blood pressure, heart disease, and cancer. He or she will ask if you have symptoms that concern you, if you smoke, and about your mood. You may also be asked about your intake of medicines, supplements, food, and alcohol. Any of the following may be done: Your weight  will be checked. Your height may also be checked so your body mass index (BMI) can be calculated. Your BMI shows if you are at a healthy weight. Your blood pressure  and heart rate will be checked. Your temperature may also be checked. Blood and urine tests  may be done. Blood tests may be done to check your cholesterol levels. Abnormal cholesterol levels increase your risk for heart disease and stroke. You may also need a blood or urine test to check for diabetes if you are at increased risk. Urine tests may be done to look for signs of an infection or kidney disease. A physical exam  includes checking your heartbeat and lungs with a stethoscope. Your healthcare provider may also check your skin to look for sun damage. Screening tests  may be recommended. A screening test is done to check for diseases that may not cause symptoms.  The screening tests you may need depend on your age, gender, family history, and lifestyle habits. For example, colorectal screening may be recommended if you are 48years old or older. Screening tests you need if you are a woman:   A Pap smear  is used to screen for cervical cancer. Pap smears are usually done every 3 to 5 years depending on your age. You may need them more often if you have had abnormal Pap smear test results in the past. Ask your healthcare provider how often you should have a Pap smear. A mammogram  is an x-ray of your breasts to screen for breast cancer. Experts recommend mammograms every 2 years starting at age 48 years. You may need a mammogram at age 52 years or younger if you have an increased risk for breast cancer. Talk to your healthcare provider about when you should start having mammograms and how often you need them. Vaccines you may need:   Get an influenza vaccine  every year. The influenza vaccine protects you from the flu. Several types of viruses cause the flu. The viruses change over time, so new vaccines are made each year. Get a tetanus-diphtheria (Td) booster vaccine  every 10 years. This vaccine protects you against tetanus and diphtheria. Tetanus is a severe infection that may cause painful muscle spasms and lockjaw. Diphtheria is a severe bacterial infection that causes a thick covering in the back of your mouth and throat. Get a human papillomavirus (HPV) vaccine  if you are female and aged 23 to 32 or male 23 to 24 and never received it. This vaccine protects you from HPV infection. HPV is the most common infection spread by sexual contact. HPV may also cause vaginal, penile, and anal cancers. Get a pneumococcal vaccine  if you are aged 72 years or older. The pneumococcal vaccine is an injection given to protect you from pneumococcal disease. Pneumococcal disease is an infection caused by pneumococcal bacteria. The infection may cause pneumonia, meningitis, or an ear infection.     Get a shingles vaccine  if you are 60 or older, even if you have had shingles before. The shingles vaccine is an injection to protect you from the varicella-zoster virus. This is the same virus that causes chickenpox. Shingles is a painful rash that develops in people who had chickenpox or have been exposed to the virus. How to eat healthy:  My Plate is a model for planning healthy meals. It shows the types and amounts of foods that should go on your plate. Fruits and vegetables make up about half of your plate, and grains and protein make up the other half. A serving of dairy is included on the side of your plate. The amount of calories and serving sizes you need depends on your age, gender, weight, and height. Examples of healthy foods are listed below:  Eat a variety of vegetables  such as dark green, red, and orange vegetables. You can also include canned vegetables low in sodium (salt) and frozen vegetables without added butter or sauces. Eat a variety of fresh fruits , canned fruit in 100% juice, frozen fruit, and dried fruit. Include whole grains. At least half of the grains you eat should be whole grains. Examples include whole-wheat bread, wheat pasta, brown rice, and whole-grain cereals such as oatmeal.    Eat a variety of protein foods such as seafood (fish and shellfish), lean meat, and poultry without skin (turkey and chicken). Examples of lean meats include pork leg, shoulder, or tenderloin, and beef round, sirloin, tenderloin, and extra lean ground beef. Other protein foods include eggs and egg substitutes, beans, peas, soy products, nuts, and seeds. Choose low-fat dairy products such as skim or 1% milk or low-fat yogurt, cheese, and cottage cheese. Limit unhealthy fats  such as butter, hard margarine, and shortening. Exercise:  Exercise at least 30 minutes per day on most days of the week. Some examples of exercise include walking, biking, dancing, and swimming.  You can also fit in more physical activity by taking the stairs instead of the elevator or parking farther away from stores. Include muscle strengthening activities 2 days each week. Regular exercise provides many health benefits. It helps you manage your weight, and decreases your risk for type 2 diabetes, heart disease, stroke, and high blood pressure. Exercise can also help improve your mood. Ask your healthcare provider about the best exercise plan for you. General health and safety guidelines:   Do not smoke. Nicotine and other chemicals in cigarettes and cigars can cause lung damage. Ask your healthcare provider for information if you currently smoke and need help to quit. E-cigarettes or smokeless tobacco still contain nicotine. Talk to your healthcare provider before you use these products. Limit alcohol. A drink of alcohol is 12 ounces of beer, 5 ounces of wine, or 1½ ounces of liquor. Lose weight, if needed. Being overweight increases your risk of certain health conditions. These include heart disease, high blood pressure, type 2 diabetes, and certain types of cancer. Protect your skin. Do not sunbathe or use tanning beds. Use sunscreen with a SPF 15 or higher. Apply sunscreen at least 15 minutes before you go outside. Reapply sunscreen every 2 hours. Wear protective clothing, hats, and sunglasses when you are outside. Drive safely. Always wear your seatbelt. Make sure everyone in your car wears a seatbelt. A seatbelt can save your life if you are in an accident. Do not use your cell phone when you are driving. This could distract you and cause an accident. Pull over if you need to make a call or send a text message. Practice safe sex. Use latex condoms if are sexually active and have more than one partner. Your healthcare provider may recommend screening tests for sexually transmitted infections (STIs). Wear helmets, lifejackets, and protective gear.   Always wear a helmet when you ride a bike or motorcycle, go skiing, or play sports that could cause a head injury. Wear protective equipment when you play sports. Wear a lifejacket when you are on a boat or doing water sports. © Copyright Adelina Huerta 2022 Information is for End User's use only and may not be sold, redistributed or otherwise used for commercial purposes. The above information is an  only. It is not intended as medical advice for individual conditions or treatments. Talk to your doctor, nurse or pharmacist before following any medical regimen to see if it is safe and effective for you. Obesity   AMBULATORY CARE:   Obesity  means your body mass index (BMI) is greater than 30. Your healthcare provider will use your age, height, and weight to measure your BMI. The risks of obesity include  many health problems, including injuries or physical disability. Diabetes (high blood sugar level)    High blood pressure or high cholesterol    Heart disease    Stroke    Gallbladder or liver disease    Cancer of the colon, breast, prostate, liver, or kidney    Sleep apnea    Arthritis or gout    Screening  is done to check for health conditions before you have signs or symptoms. If you are 28to 79years old, your blood sugar level may be checked every 3 years for signs of prediabetes or diabetes. Your healthcare provider will check your blood pressure at each visit. High blood pressure can lead to a stroke or other problems. Your provider may check for signs of heart disease, cancer, or other health problems. Seek care immediately if:   You have a severe headache, confusion, or difficulty speaking. You have weakness on one side of your body. You have chest pain, sweating, or shortness of breath. Call your doctor if:   You have symptoms of gallbladder or liver disease, such as pain in your upper abdomen. You have knee or hip pain and discomfort while walking.     You have symptoms of diabetes, such as intense hunger and thirst, and frequent urination. You have symptoms of sleep apnea, such as snoring or daytime sleepiness. You have questions or concerns about your condition or care. Treatment for obesity  focuses on helping you lose weight to improve your health. Even a small decrease in BMI can reduce the risk for many health problems. Your healthcare provider will help you set a weight-loss goal.  Lifestyle changes  are the first step in treating obesity. These include making healthy food choices and getting regular physical activity. Your healthcare provider may suggest a weight-loss program that involves coaching, education, and therapy. Medicine  may help you lose weight when it is used with a healthy foods and physical activity. Surgery  can help you lose weight if you have obesity along with other health problems. Several types of weight-loss surgery are available. Ask your healthcare provider for more information. Tips for safe weight loss:   Set small, realistic goals. An example of a small goal is to walk for 20 minutes 5 days a week. Anther goal is to lose 5% of your body weight. Ask for support. Tell friends, family members, and coworkers about your goals. Ask someone to lose weight with you. You may also want to join a weight-loss support group. Identify foods or triggers that may cause you to overeat. Remove tempting high-calorie foods from your home and workplace. Place a bowl of fresh fruit on your kitchen counter. If stress causes you to eat, find other ways to cope with stress. A counselor or therapist may be able to help you. Track your daily calories and activity. Write down what you eat and drink. Also write down how many minutes of physical activity you do each day. Track your weekly weight. Weigh yourself in the morning, before you eat or drink anything but after you use the bathroom. Use the same scale, in the same place, and in similar clothing each time.  Only weigh yourself 1 to 2 times each week, or as directed. You may become discouraged if you weigh yourself every day. Eating changes: You will need to eat 500 to 1,000 fewer calories each day than you currently eat to lose 1 to 2 pounds a week. The following changes will help you cut calories:  Eat smaller portions. Use small plates, no larger than 9 inches in diameter. Fill your plate half full of fruits and vegetables. Measure your food using measuring cups until you know what a serving size looks like. Eat 3 meals and 1 or 2 snacks each day. Plan your meals in advance. Marden Gull and eat at home most of the time. Eat slowly. Do not skip meals. Skipping meals can lead to overeating later in the day. This can make it harder for you to lose weight. Talk with a dietitian to help you make a meal plan and schedule that is right for you. Eat fruits and vegetables at every meal.  They are low in calories and high in fiber, which makes you feel full. Do not add butter, margarine, or cream sauce to vegetables. Use herbs to season steamed vegetables. Eat less fat and fewer fried foods. Eat more baked or grilled chicken and fish. These protein sources are lower in calories and fat than red meat. Limit fast food. Dress your salads with olive oil and vinegar instead of bottled dressing. Limit the amount of sugar you eat. Do not drink sugary beverages. Limit alcohol. Activity changes:  Physical activity is good for your body in many ways. It helps you burn calories and build strong muscles. It decreases stress and depression, and improves your mood. It can also help you sleep better. Talk to your healthcare provider before you begin an exercise program.  Exercise for at least 30 minutes 5 days a week. Start slowly. Set aside time each day for physical activity that you enjoy and that is convenient for you.  It is best to do both weight training and an activity that increases your heart rate, such as walking, bicycling, or swimming. Find ways to be more active. Do yard work and housecleaning. Walk up the stairs instead of using elevators. Spend your leisure time going to events that require walking, such as outdoor festivals or fairs. This extra physical activity can help you lose weight and keep it off. Follow up with your doctor as directed: You may need to meet with a dietitian. Write down your questions so you remember to ask them during your visits. © Copyright Leslie Faith 2022 Information is for End User's use only and may not be sold, redistributed or otherwise used for commercial purposes. The above information is an  only. It is not intended as medical advice for individual conditions or treatments. Talk to your doctor, nurse or pharmacist before following any medical regimen to see if it is safe and effective for you. Cholesterol and Your Health   AMBULATORY CARE:   Cholesterol  is a waxy, fat-like substance. Your body uses cholesterol to make hormones and new cells, and to protect nerves. Cholesterol is made by your body. It also comes from certain foods you eat, such as meat and dairy products. Your healthcare provider can help you set goals for your cholesterol levels. He or she can help you create a plan to meet your goals. Cholesterol level goals: Your cholesterol level goals depend on your risk for heart disease, your age, and your other health conditions. The following are general guidelines: Total cholesterol  includes low-density lipoprotein (LDL), high-density lipoprotein (HDL), and triglyceride levels. The total cholesterol level should be lower than 200 mg/dL and is best at about 150 mg/dL. LDL cholesterol  is called bad cholesterol  because it forms plaque in your arteries. As plaque builds up, your arteries become narrow, and less blood flows through. When plaque decreases blood flow to your heart, you may have chest pain.  If plaque completely blocks an artery that brings blood to your heart, you may have a heart attack. Plaque can break off and form blood clots. Blood clots may block arteries in your brain and cause a stroke. The level should be less than 130 mg/dL and is best at about 100 mg/dL. HDL cholesterol  is called good cholesterol  because it helps remove LDL cholesterol from your arteries. It does this by attaching to LDL cholesterol and carrying it to your liver. Your liver breaks down LDL cholesterol so your body can get rid of it. High levels of HDL cholesterol can help prevent a heart attack and stroke. Low levels of HDL cholesterol can increase your risk for heart disease, heart attack, and stroke. The level should be 60 mg/dL or higher. Triglycerides  are a type of fat that store energy from foods you eat. High levels of triglycerides also cause plaque buildup. This can increase your risk for a heart attack or stroke. If your triglyceride level is high, your LDL cholesterol level may also be high. The level should be less than 150 mg/dL. Any of the following can increase your risk for high cholesterol:   Smoking cigarettes    Being overweight or obese, or not getting enough exercise    Drinking large amounts of alcohol    A medical condition such as hypertension (high blood pressure) or diabetes    Certain genes passed from your parents to you    Age older than 65 years    What you need to know about having your cholesterol levels checked: Adults 21to 39years of age should have their cholesterol levels checked every 4 to 6 years. Adults 45 years or older should have their cholesterol checked every 1 to 2 years. You may need your cholesterol checked more often, or at a younger age, if you have risk factors for heart disease. You may also need to have your cholesterol checked more often if you have other health conditions, such as diabetes. Blood tests are used to check cholesterol levels.  Blood tests measure your levels of triglycerides, LDL cholesterol, and HDL cholesterol. How healthy fats affect your cholesterol levels:  Healthy fats, also called unsaturated fats, help lower LDL cholesterol and triglyceride levels. Healthy fats include the following:  Monounsaturated fats  are found in foods such as olive oil, canola oil, avocado, nuts, and olives. Polyunsaturated fats,  such as omega 3 fats, are found in fish, such as salmon, trout, and tuna. They can also be found in plant foods such as flaxseed, walnuts, and soybeans. How unhealthy fats affect your cholesterol levels:  Unhealthy fats increase LDL cholesterol and triglyceride levels. They are found in foods high in cholesterol, saturated fat, and trans fat:  Cholesterol  is found in eggs, dairy, and meat. Saturated fat  is found in butter, cheese, ice cream, whole milk, and coconut oil. Saturated fat is also found in meat, such as sausage, hot dogs, and bologna. Trans fat  is found in liquid oils and is used in fried and baked foods. Foods that contain trans fats include chips, crackers, muffins, sweet rolls, microwave popcorn, and cookies. Treatment  for high cholesterol will also decrease your risk of heart disease, heart attack, and stroke. Treatment may include any of the following:  Lifestyle changes  may include food, exercise, weight loss, and quitting smoking. You may also need to decrease the amount of alcohol you drink. Your healthcare provider will want you to start with lifestyle changes. Other treatment may be added if lifestyle changes are not enough. Your healthcare provider may recommend you work with a team to manage hyperlipidemia. The team may include medical experts such as a dietitian, an exercise or physical therapist, and a behavior therapist. Your family members may be included in helping you create lifestyle changes. Medicines  may be given to lower your LDL cholesterol, triglyceride levels, or total cholesterol level.  You may need medicines to lower your cholesterol if any of the following is true:    You have a history of stroke, TIA, unstable angina, or a heart attack. Your LDL cholesterol level is 190 mg/dL or higher. You are age 36 to 76 years, have diabetes or heart disease risk factors, and your LDL cholesterol is 70 mg/dL or higher. Supplements  include fish oil, red yeast rice, and garlic. Fish oil may help lower your triglyceride and LDL cholesterol levels. It may also increase your HDL cholesterol level. Red yeast rice may help decrease your total cholesterol level and LDL cholesterol level. Garlic may help lower your total cholesterol level. Do not take any supplements without talking to your healthcare provider. Food changes you can make to lower your cholesterol levels:  A dietitian can help you create a healthy eating plan. He or she can show you how to read food labels and choose foods low in saturated fat, trans fats, and cholesterol. Decrease the total amount of fat you eat. Choose lean meats, fat-free or 1% fat milk, and low-fat dairy products, such as yogurt and cheese. Try to limit or avoid red meats. Limit or do not eat fried foods or baked goods, such as cookies. Replace unhealthy fats with healthy fats. Cook foods in olive oil or canola oil. Choose soft margarines that are low in saturated fat and trans fat. Seeds, nuts, and avocados are other examples of healthy fats. Eat foods with omega-3 fats. Examples include salmon, tuna, mackerel, walnuts, and flaxseed. Eat fish 2 times per week. Pregnant women should not eat fish that have high levels of mercury, such as shark, swordfish, and brayden mackerel. Increase the amount of high-fiber foods you eat. High-fiber foods can help lower your LDL cholesterol. Aim to get between 20 and 30 grams of fiber each day. Fruits and vegetables are high in fiber. Eat at least 5 servings each day.  Other high-fiber foods are whole-grain or whole-wheat breads, pastas, or cereals, and brown rice. Eat 3 ounces of whole-grain foods each day. Increase fiber slowly. You may have abdominal discomfort, bloating, and gas if you add fiber to your diet too quickly. Eat healthy protein foods. Examples include low-fat dairy products, skinless chicken and turkey, fish, and nuts. Limit foods and drinks that are high in sugar. Your dietitian or healthcare provider can help you create daily limits for high-sugar foods and drinks. The limit may be lower if you have diabetes or another health condition. Limits can also help you lose weight if needed. Lifestyle changes you can make to lower your cholesterol levels:   Maintain a healthy weight. Ask your healthcare provider what a healthy weight is for you. Ask him or her to help you create a weight loss plan if needed. Weight loss can decrease your total cholesterol and triglyceride levels. Weight loss may also help keep your blood pressure at a healthy level. Be physically active throughout the day. Physical activity, such as exercise, can help lower your total cholesterol level and maintain a healthy weight. Physical activity can also help increase your HDL cholesterol level. Work with your healthcare provider to create an program that is right for you. Get at least 30 to 40 minutes of moderate physical activity most days of the week. Examples of exercise include brisk walking, swimming, or biking. Also include strength training at least 2 times each week. Your healthcare providers can help you create a physical activity plan. Do not smoke. Nicotine and other chemicals in cigarettes and cigars can raise your cholesterol levels. Ask your healthcare provider for information if you currently smoke and need help to quit. E-cigarettes or smokeless tobacco still contain nicotine. Talk to your healthcare provider before you use these products. Limit or do not drink alcohol. Alcohol can increase your triglyceride levels.  Ask your healthcare provider before you drink alcohol. Ask how much is okay for you to drink in 24 hours or 1 week. Follow up with your doctor as directed:  Write down your questions so you remember to ask them during your visits. © Copyright [facility] 2022 Information is for End User's use only and may not be sold, redistributed or otherwise used for commercial purposes. The above information is an  only. It is not intended as medical advice for individual conditions or treatments. Talk to your doctor, nurse or pharmacist before following any medical regimen to see if it is safe and effective for you.

## 2023-09-01 ENCOUNTER — PREP FOR PROCEDURE (OUTPATIENT)
Dept: BARIATRICS | Facility: CLINIC | Age: 53
End: 2023-09-01

## 2023-09-01 DIAGNOSIS — Z98.84 BARIATRIC SURGERY STATUS: Primary | ICD-10-CM

## 2023-09-02 ENCOUNTER — HOSPITAL ENCOUNTER (OUTPATIENT)
Dept: ULTRASOUND IMAGING | Facility: HOSPITAL | Age: 53
Discharge: HOME | End: 2023-09-02
Payer: COMMERCIAL

## 2023-09-02 ENCOUNTER — APPOINTMENT (OUTPATIENT)
Dept: LAB | Facility: HOSPITAL | Age: 53
End: 2023-09-02

## 2023-09-02 DIAGNOSIS — K76.0 NAFLD (NONALCOHOLIC FATTY LIVER DISEASE): ICD-10-CM

## 2023-09-02 DIAGNOSIS — Z01.818 PRE-OPERATIVE EXAM: ICD-10-CM

## 2023-09-02 PROCEDURE — 76705 ECHO EXAM OF ABDOMEN: CPT

## 2023-09-02 PROCEDURE — 76981 USE PARENCHYMA: CPT

## 2023-09-02 PROCEDURE — 86706 HEP B SURFACE ANTIBODY: CPT

## 2023-09-02 PROCEDURE — 87340 HEPATITIS B SURFACE AG IA: CPT

## 2023-09-02 PROCEDURE — 36415 COLL VENOUS BLD VENIPUNCTURE: CPT

## 2023-09-02 PROCEDURE — 86704 HEP B CORE ANTIBODY TOTAL: CPT

## 2023-09-02 PROCEDURE — 86708 HEPATITIS A ANTIBODY: CPT

## 2023-09-03 LAB
HAV AB SER QL IA: REACTIVE
HBV CORE AB SER QL: NORMAL
HBV SURFACE AB SER-ACNC: <3 MIU/ML
HBV SURFACE AG SER QL: NORMAL

## 2023-09-04 RX ORDER — SODIUM CHLORIDE 9 MG/ML
125 INJECTION, SOLUTION INTRAVENOUS CONTINUOUS
Status: CANCELLED | OUTPATIENT
Start: 2023-09-04

## 2023-09-06 ENCOUNTER — HOSPITAL ENCOUNTER (OUTPATIENT)
Dept: GASTROENTEROLOGY | Facility: HOSPITAL | Age: 53
Setting detail: OUTPATIENT SURGERY
Discharge: HOME | End: 2023-09-06
Attending: SURGERY
Payer: COMMERCIAL

## 2023-09-06 ENCOUNTER — ANESTHESIA EVENT (OUTPATIENT)
Dept: GASTROENTEROLOGY | Facility: HOSPITAL | Age: 53
End: 2023-09-06

## 2023-09-06 ENCOUNTER — TELEPHONE (OUTPATIENT)
Dept: BARIATRICS | Facility: CLINIC | Age: 53
End: 2023-09-06

## 2023-09-06 ENCOUNTER — ANESTHESIA (OUTPATIENT)
Dept: GASTROENTEROLOGY | Facility: HOSPITAL | Age: 53
End: 2023-09-06

## 2023-09-06 VITALS
SYSTOLIC BLOOD PRESSURE: 122 MMHG | BODY MASS INDEX: 40.48 KG/M2 | DIASTOLIC BLOOD PRESSURE: 78 MMHG | OXYGEN SATURATION: 98 % | HEART RATE: 67 BPM | HEIGHT: 65 IN | WEIGHT: 243 LBS | TEMPERATURE: 98.4 F | RESPIRATION RATE: 16 BRPM

## 2023-09-06 DIAGNOSIS — L21.9 SEBORRHEIC DERMATITIS OF SCALP: ICD-10-CM

## 2023-09-06 DIAGNOSIS — Z98.84 BARIATRIC SURGERY STATUS: ICD-10-CM

## 2023-09-06 LAB
EXT PREGNANCY TEST URINE: NEGATIVE
EXT. CONTROL: NORMAL

## 2023-09-06 PROCEDURE — 88305 TISSUE EXAM BY PATHOLOGIST: CPT | Performed by: PATHOLOGY

## 2023-09-06 PROCEDURE — 43239 EGD BIOPSY SINGLE/MULTIPLE: CPT | Performed by: SURGERY

## 2023-09-06 PROCEDURE — 81025 URINE PREGNANCY TEST: CPT | Performed by: ANESTHESIOLOGY

## 2023-09-06 RX ORDER — LIDOCAINE HYDROCHLORIDE 20 MG/ML
INJECTION, SOLUTION EPIDURAL; INFILTRATION; INTRACAUDAL; PERINEURAL AS NEEDED
Status: DISCONTINUED | OUTPATIENT
Start: 2023-09-06 | End: 2023-09-06

## 2023-09-06 RX ORDER — SODIUM CHLORIDE 9 MG/ML
125 INJECTION, SOLUTION INTRAVENOUS CONTINUOUS
Status: DISCONTINUED | OUTPATIENT
Start: 2023-09-06 | End: 2023-09-10 | Stop reason: HOSPADM

## 2023-09-06 RX ORDER — PROPOFOL 10 MG/ML
INJECTION, EMULSION INTRAVENOUS AS NEEDED
Status: DISCONTINUED | OUTPATIENT
Start: 2023-09-06 | End: 2023-09-06

## 2023-09-06 RX ADMIN — SODIUM CHLORIDE 125 ML/HR: 0.9 INJECTION, SOLUTION INTRAVENOUS at 10:19

## 2023-09-06 RX ADMIN — PROPOFOL 50 MG: 10 INJECTION, EMULSION INTRAVENOUS at 10:42

## 2023-09-06 RX ADMIN — PROPOFOL 150 MG: 10 INJECTION, EMULSION INTRAVENOUS at 10:40

## 2023-09-06 RX ADMIN — LIDOCAINE HYDROCHLORIDE 50 MG: 20 INJECTION, SOLUTION EPIDURAL; INFILTRATION; INTRACAUDAL at 10:40

## 2023-09-06 NOTE — INTERVAL H&P NOTE
H&P reviewed. After examining the patient I find no changes in the patients condition since the H&P had been written.     Vitals:    09/06/23 1010   BP: 126/59   Pulse: 66   Resp: 19   Temp: 98.4 °F (36.9 °C)   SpO2: 96%

## 2023-09-06 NOTE — ANESTHESIA PREPROCEDURE EVALUATION
Procedure:  EGD    Relevant Problems   ANESTHESIA (within normal limits)      CARDIO   (+) Other hyperlipidemia      GI/HEPATIC   (+) GERD (gastroesophageal reflux disease)      MUSCULOSKELETAL   (+) Osteoarthritis      NEURO/PSYCH   (+) Generalized anxiety disorder      Other   (+) Class 3 severe obesity with serious comorbidity and body mass index (BMI) of 40.0 to 44.9 in adult [facility])        Physical Exam    Airway    Mallampati score: IV  TM Distance: >3 FB  Neck ROM: full     Dental       Cardiovascular  Rhythm: regular, Rate: normal,     Pulmonary  Breath sounds clear to auscultation,     Other Findings        Anesthesia Plan  ASA Score- 3     Anesthesia Type- IV sedation with anesthesia with ASA Monitors. Additional Monitors:   Airway Plan:           Plan Factors-Exercise tolerance (METS): >4 METS. Chart reviewed. Patient summary reviewed. Patient is not a current smoker. Induction- intravenous. Postoperative Plan-     Informed Consent- Anesthetic plan and risks discussed with patient.

## 2023-09-06 NOTE — ANESTHESIA POSTPROCEDURE EVALUATION
Post-Op Assessment Note    CV Status:  Stable    Pain management: adequate     Mental Status:  Alert and awake   Hydration Status:  Euvolemic   PONV Controlled:  Controlled   Airway Patency:  Patent      Post Op Vitals Reviewed: Yes      Staff: Anesthesiologist         No notable events documented.     BP      Temp      Pulse     Resp      SpO2      /76   Pulse 74   Temp 98.4 °F (36.9 °C) (Temporal)   Resp 16   Ht 5' 4.5" (1.638 m)   Wt 110 kg (243 lb)   SpO2 98%   BMI 41.07 kg/m²

## 2023-09-06 NOTE — TELEPHONE ENCOUNTER
Patient called with question on what she needs from cardiology that cardiology is confusing her and having her speak with multiple people. I explained to her the referral is in the system ans specifically states a written cardiac clearance and an EKG are needed to submit case for a pre authorization and to be scheduled for surgery.

## 2023-09-10 DIAGNOSIS — L21.9 SEBORRHEIC DERMATITIS OF SCALP: ICD-10-CM

## 2023-09-12 PROCEDURE — 88305 TISSUE EXAM BY PATHOLOGIST: CPT | Performed by: PATHOLOGY

## 2023-09-12 RX ORDER — KETOCONAZOLE 20 MG/ML
SHAMPOO TOPICAL
Qty: 120 ML | Refills: 0 | Status: SHIPPED | OUTPATIENT
Start: 2023-09-12

## 2023-09-21 ENCOUNTER — TELEPHONE (OUTPATIENT)
Dept: GASTROENTEROLOGY | Facility: CLINIC | Age: 53
End: 2023-09-21

## 2023-09-21 DIAGNOSIS — Z01.818 PRE-OPERATIVE EXAM: ICD-10-CM

## 2023-09-21 DIAGNOSIS — K76.0 NAFLD (NONALCOHOLIC FATTY LIVER DISEASE): Primary | ICD-10-CM

## 2023-09-21 NOTE — TELEPHONE ENCOUNTER
Returned patient's call to discuss results and recommendations. Unfortunately, patient's US elastography was nondiagnostic. Alternatively recommended a BHATTI FibroSure and repeat US elastography x3 months. Patient states that she may be scheduled for her bariatric surgery in late November/early December. Therefore, recommended she have a conversation with bariatrics to determine if a BHATTI FibroSure is adequate for her to proceed with her surgery or if they would like her to have an US elastography for staging of fibrosis. If they would like a US elastography, would plan for repeat 2-3 weeks prior to her surgery. If again nondiagnostic, would recommend a conversation regarding the risks vs benefits of an intraoperative liver biopsy given there is no obvious clinical, serologic or radiographic evidence of chronic liver disease. Patient gave verbal understanding and agrees with recommendations. All questions were answered to her satisfaction.

## 2023-09-27 NOTE — PROGRESS NOTES
Bariatric Nutrition Assessment Note    Type of surgery    Preop  Surgery Date: TBD- no program requirement     Surgeon: Dr. Sofi Lemus  48 y.o.  female     Wt with BMI of 25: 148. 2  Pre-Op Excess Wt: 95.8 lbs   Wt 112 kg (247 lb 3.2 oz)   BMI 41.78 kg/m²      Pt has maintained her weight within 3.3# since starting the program one month ago       Wt Readings from Last 3 Encounters:   09/06/23 110 kg (243 lb)   08/31/23 110 kg (243 lb 8 oz)   08/31/23 111 kg (245 lb)       [place] [facility] [place] Equation:  1720  Estimated calories for weight loss 2503-9382 kcal per day  1/2 to 1# per wk wt loss - sedentary )  Estimated protein needs  grams per day (1.0-1.5 gms/kg IBW )   Estimated fluid needs 67- 79 ounces per day (30-35 ml/kg IBW )      Weight History   Onset of Obesity: Adult- 2010 after quitting smoking   Family history of obesity: Yes  Wt Loss Attempts: Commercial Programs ([employer], Alexa Anderson, etc.)  Fasting  High Protein/Low CHO diets (Atkins, [place], etc.)  Meal Replacements (Medifast, Slim Fast, etc.)  Self Created Diets (Portion Control, Healthy Food Choices, etc.)  Patient has tried the above for 6 months or more with insufficient weight loss or weight regain, which is why patient has requested to be evaluated for weight loss surgery today  Maximum Wt Lost: 30 pounds       Review of History and Medications   Past Medical History:   Diagnosis Date   • Arthritis    • Class 2 severe obesity with serious comorbidity and body mass index (BMI) of 39.0 to 39.9 in adult [facility])    • COVID-19 11/18/2020   • Diabetes mellitus ([address])     gestational   • Diverticulosis 11/2022   • Generalized anxiety disorder 04/13/2015   • GERD (gastroesophageal reflux disease)    • History of gestational diabetes 1999, 2003   • IFG (impaired fasting glucose)    • Obesity    • Osteoarthritis 2015    Osteoarthritis (Ankles & Knees)   • Other hyperlipidemia 06/03/2021   • Seborrheic dermatitis 2023   • Vitamin D deficiency 2019     Past Surgical History:   Procedure Laterality Date   • ARTHROSCOPY ANKLE Right 2016    OA   •  SECTION   &    • CHOLECYSTECTOMY  2021   • COLONOSCOPY     • EGD  2023    Gastritis   • SC LAPAROSCOPY SURG CHOLECYSTECTOMY N/A 2021    Procedure: LAPAROSCOPIC POSSIBLE OPEN CHOLECYSTECTOMY;  Surgeon: Mariya Mason MD;  Location:  MAIN OR;  Service: General   • TONSILLECTOMY       Social History     Socioeconomic History   • Marital status: /Civil Union     Spouse name: Not on file   • Number of children: 2   • Years of education: Not on file   • Highest education level: Not on file   Occupational History   • Occupation: document control    Tobacco Use   • Smoking status: Former     Packs/day: 0.50     Years: 20.00     Total pack years: 10.00     Types: Cigarettes     Start date: 1986     Quit date: 2010     Years since quittin.7   • Smokeless tobacco: Never   Vaping Use   • Vaping Use: Never used   Substance and Sexual Activity   • Alcohol use:  Yes     Alcohol/week: 1.0 standard drink of alcohol     Types: 1 Glasses of wine per week   • Drug use: Not Currently     Types: Marijuana   • Sexual activity: Yes     Partners: Male     Birth control/protection: Condom Male   Other Topics Concern   • Not on file   Social History Narrative        Lives , 2 sons    2 children - 2 sons     at [address] [place] [address] Strain: [address]  (2023)    Overall Financial Resource Strain (CARDIA)    • Difficulty of Paying Living Expenses: Not hard at all   Food Insecurity: No Food Insecurity (2023)    Hunger Vital Sign    • Worried About Running Out of Food in the Last Year: Never true    • Ran Out of Food in the Last Year: Never true   Transportation Needs: No Transportation Needs (2023)    PRAPARE - Transportation    • Lack of Transportation (Medical): No    • Lack of Transportation (Non-Medical): No   Physical Activity: Not on file   Stress: Not on file   Social Connections: Not on file   Intimate Partner Violence: Not on file   Housing Stability: Low Risk  (8/31/2023)    Housing Stability Vital Sign    • Unable to Pay for Housing in the Last Year: No    • Number of Places Lived in the Last Year: 1    • Unstable Housing in the Last Year: No       Current Outpatient Medications:   •  ergocalciferol (ERGOCALCIFEROL) 1.25 MG (90523 UT) capsule, Take 1 capsule (50,000 Units total) by mouth once a week for 12 doses, Disp: 12 capsule, Rfl: 0  •  ketoconazole (NIZORAL) 2 % shampoo, Shampoo twice a week for 8 weeks, then as needed. Leave on for 5 minutes before rinsing.   Rx per Derm., Disp: 120 mL, Rfl: 0     Does not currently take any vitamins or minerals     Food Intake and Lifestyle Assessment   Food Intake Assessment completed via usual diet recall  Works 4 12 hours shifts   Breakfast: 9:30 am - fruit parfait   Yogurt with fruit and granola   Snack: 0   Lunch: 1 pm - wrap - chicken or un crustables [employer] or ham and turkey wrap   Sometimes fruits   Snack: 0  Dinner: 7 pm - will order out   [employer], hamburger or cheeseburger, [employer], [employer]   When cooks chicken thighs with parmesan cheese with potatoes   Will cook 1-2 days per week   Snack: 0  Beverage intake: water  Protein supplement: none   Estimated protein intake per day: ~ 70-80 grams protein per day   Estimated fluid intake per day: 5 24 ounce bottles of water per day   Meals eaten away from home: 12 to 15 per week   Typical meal pattern: 3 meals per day and 0-1 snacks per day  Eating Behaviors: eats out a lot   Food allergies or intolerances: No Known Allergies   Some intolerances since gallbladder removal   Cultural or Baptist considerations: N/A    Physical Assessment  Physical Activity  Types of exercise: active at work   Used to track at 10 000 per day   Works [employer] at    [place] once per week   Current physical limitations: ankles and knees due to osteoarthris     Psychosocial Assessment   Support systems: spouse  Socioeconomic factors: works 4 12 hour days - 48 hours per week   Sunday through [place]     Nutrition Diagnosis  Diagnosis: Overweight / Obesity (NC-3.3), Excessive energy intake (NI-1.5) and Undesirable food choices (NB-1.7)  Related to: Food and nutrition-related knowledge deficit, Physical inactivity and Excessive energy intake  As Evidenced by: BMI >25, Excessive energy intake and Unintentional weight gain     Nutrition Prescription: Recommend the following diet  1800 calories 90 grams protein     Interventions and Teaching   Discussed pre-op and post-op nutrition guidelines. Patient educated and handouts provided.   Surgical changes to stomach / GI  Capacity of post-surgery stomach  Diet progression  Adequate hydration  Sugar and fat restriction to decrease "dumping syndrome"  Fat restriction to decrease steatorrhea  Expected weight loss  Weight loss plateaus/ possibility of weight regain  Exercise  Suggestions for pre-op diet  Nutrition considerations after surgery  Protein supplements  Meal planning and preparation  Appropriate carbohydrate, protein, and fat intake, and food/fluid choices to maximize safe weight loss, nutrient intake, and tolerance   Dietary and lifestyle changes  Possible problems with poor eating habits  Intuitive eating  Techniques for self monitoring and keeping daily food journal  Potential for food intolerance after surgery, and ways to deal with them including: lactose intolerance, nausea, reflux, vomiting, diarrhea, food intolerance, appetite changes, gas  Vitamin / Mineral supplementation of Multivitamin with minerals, Calcium, Vitamin B12, Iron, Fat Soluble vitamins and Vitamin D  Patient is not currently pregnant and doesn't desire to become pregnant a minimum of one year post-op    Education provided to: patient and and spouse ( present and supportive )     Barriers to learning: No barriers identified    Readiness to change: preparation    Prior research on procedure: discussed with provider and pre-op class    Comprehension: needs reinforcement and verbalizes understanding     Expected Compliance: good  Recommendations  Pt is an appropriate candidate for surgery. Yes    Workflow: (Incomplete in [facility]):  • Psych and/or D+A Clearance: N/A  • Blood Work: done  • PCP letter: done  • Surgeon Appt: done  • EGD: done  • Cardiac Risk Assessment with ECG: 10/4/2023  • Sleep Studies: N/A  • Nicotine test: N/A  • Pre-Operative Program: N/A  • NAFLD Score Calculated: yes  • Hepatology consult: 9/2/2023         Evaluation / Monitoring  Dietitian to Monitor: Eating pattern as discussed Tolerance of nutrition prescription Body weight Lab values Physical activity Bowel pattern  Pt has started packing her lunch to decrease take out  She has been packing a salad with crackers and tuna. She has been practicing 30/30 rule. She is taking her vitamins and minerals as recommended. Overall making lifestyle changes in preparation for surgery.      Goals  Food journal, Complete lession plans 1-6, Eat 3 meals per day and track steps 6000 or  more per day   Continue to Pack lunch to decrease eating out /take out   Practice 30/30 rule and progress to 30/60 rule  Time meals to take 15-20 minutes and chew food well     Time Spent:   30 Minutes

## 2023-09-29 ENCOUNTER — OFFICE VISIT (OUTPATIENT)
Dept: BARIATRICS | Facility: CLINIC | Age: 53
End: 2023-09-29

## 2023-09-29 ENCOUNTER — HOSPITAL ENCOUNTER (EMERGENCY)
Facility: HOSPITAL | Age: 53
Discharge: HOME | End: 2023-09-30
Attending: EMERGENCY MEDICINE
Payer: COMMERCIAL

## 2023-09-29 ENCOUNTER — APPOINTMENT (EMERGENCY)
Dept: RADIOLOGY | Facility: HOSPITAL | Age: 53
End: 2023-09-29
Payer: COMMERCIAL

## 2023-09-29 ENCOUNTER — TELEPHONE (OUTPATIENT)
Age: 53
End: 2023-09-29

## 2023-09-29 VITALS
TEMPERATURE: 98 F | OXYGEN SATURATION: 99 % | SYSTOLIC BLOOD PRESSURE: 150 MMHG | RESPIRATION RATE: 18 BRPM | DIASTOLIC BLOOD PRESSURE: 68 MMHG | HEART RATE: 73 BPM

## 2023-09-29 VITALS — BODY MASS INDEX: 41.78 KG/M2 | WEIGHT: 247.2 LBS

## 2023-09-29 DIAGNOSIS — E78.49 OTHER HYPERLIPIDEMIA: ICD-10-CM

## 2023-09-29 DIAGNOSIS — E66.01 CLASS 2 SEVERE OBESITY WITH SERIOUS COMORBIDITY AND BODY MASS INDEX (BMI) OF 38.0 TO 38.9 IN ADULT, UNSPECIFIED OBESITY TYPE: ICD-10-CM

## 2023-09-29 DIAGNOSIS — R73.01 IFG (IMPAIRED FASTING GLUCOSE): ICD-10-CM

## 2023-09-29 DIAGNOSIS — S93.601A SPRAIN OF RIGHT FOOT, INITIAL ENCOUNTER: Primary | ICD-10-CM

## 2023-09-29 PROCEDURE — RECHECK: Performed by: DIETITIAN, REGISTERED

## 2023-09-29 PROCEDURE — 73630 X-RAY EXAM OF FOOT: CPT

## 2023-09-29 PROCEDURE — 96372 THER/PROPH/DIAG INJ SC/IM: CPT

## 2023-09-29 PROCEDURE — 99283 EMERGENCY DEPT VISIT LOW MDM: CPT

## 2023-09-29 PROCEDURE — 99284 EMERGENCY DEPT VISIT MOD MDM: CPT | Performed by: PHYSICIAN ASSISTANT

## 2023-09-29 RX ORDER — KETOROLAC TROMETHAMINE 30 MG/ML
15 INJECTION, SOLUTION INTRAMUSCULAR; INTRAVENOUS ONCE
Status: COMPLETED | OUTPATIENT
Start: 2023-09-29 | End: 2023-09-29

## 2023-09-29 RX ORDER — ACETAMINOPHEN 325 MG/1
975 TABLET ORAL ONCE
Status: COMPLETED | OUTPATIENT
Start: 2023-09-29 | End: 2023-09-29

## 2023-09-29 RX ORDER — NAPROXEN 500 MG/1
500 TABLET ORAL 2 TIMES DAILY WITH MEALS
Qty: 8 TABLET | Refills: 0 | Status: SHIPPED | OUTPATIENT
Start: 2023-09-29 | End: 2023-10-03

## 2023-09-29 RX ADMIN — ACETAMINOPHEN 975 MG: 325 TABLET, FILM COATED ORAL at 23:28

## 2023-09-29 RX ADMIN — KETOROLAC TROMETHAMINE 15 MG: 30 INJECTION INTRAMUSCULAR; INTRAVENOUS at 23:29

## 2023-09-29 NOTE — TELEPHONE ENCOUNTER
Caller: Patient    Doctor: podiatry    Reason for call:     Patient looking for appt, she will call podiatry    Call back#: n/a

## 2023-09-30 NOTE — ED PROVIDER NOTES
History  Chief Complaint   Patient presents with   • Ankle Pain     Patient presents with right ankle pain. States she stands all day at work, when she was walking to her break she felt a twinge on the medial aspect of her ankle. Hx of OA issues on that extremity. Patient is a 72-year-old female with a history of GERD, osteoarthritis of ankles and knees, surgical history of  section, cholecystectomy that presents emerged apartment with dull aching persistent right-sided foot pain for 1 day. Patient denies associated symptoms. 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 Patient affirms palliative factors of Advil approximately 400 mg taken approximately 1 hour prior to current ED presentation with provocative factors of manual pressure to right foot and with ambulation and weightbearing on right lower extremity. Patient denies noneffective treatment. Patient denies fevers, chills, nausea, vomiting, diarrhea, constipation and urinary symptoms. Patient denies recent fall recent trauma. Patient denies sick contacts recent travel. Patient denies chest pain, shortness of breath, and abdominal pain. History provided by:  Patient   used: No    Ankle Pain  Associated symptoms: no back pain, no fever and no neck pain        Prior to Admission Medications   Prescriptions Last Dose Informant Patient Reported? Taking?   ergocalciferol (ERGOCALCIFEROL) 1.25 MG (78811 UT) capsule   No No   Sig: Take 1 capsule (50,000 Units total) by mouth once a week for 12 doses   ketoconazole (NIZORAL) 2 % shampoo   No No   Sig: Shampoo twice a week for 8 weeks, then as needed. Leave on for 5 minutes before rinsing. Rx per Derm.       Facility-Administered Medications: None       Past Medical History:   Diagnosis Date   • Arthritis    • Class 2 severe obesity with serious comorbidity and body mass index (BMI) of 39.0 to 39.9 in adult [facility])    • COVID-19 2020   • Diabetes mellitus ([address])     gestational   • Diverticulosis 2022   • Generalized anxiety disorder 2015   • GERD (gastroesophageal reflux disease)    • History of gestational diabetes ,    • IFG (impaired fasting glucose)    • Obesity    • Osteoarthritis     Osteoarthritis (Ankles & Knees)   • Other hyperlipidemia 2021   • Seborrheic dermatitis 2023   • Vitamin D deficiency 2019       Past Surgical History:   Procedure Laterality Date   • ARTHROSCOPY ANKLE Right 2016    OA   •  SECTION   &    • CHOLECYSTECTOMY  2021   • COLONOSCOPY     • EGD  2023    Gastritis   • [place] LAPAROSCOPY SURG CHOLECYSTECTOMY N/A 2021    Procedure: LAPAROSCOPIC POSSIBLE OPEN CHOLECYSTECTOMY;  Surgeon: Claudia Vera MD;  Location:  MAIN OR;  Service: General   • TONSILLECTOMY         Family History   Problem Relation Age of Onset   • Diabetes Father 36        Type Unknown   • Heart attack Father 32   • Coronary artery disease Father 32   • Heart disease Father    • Heart defect Mother         R to L Shunt   • Muscular dystrophy Mother 32   • Heart disease Mother    • No Known Problems Brother    • No Known Problems Son    • Diabetes Maternal Grandmother    • Kidney disease Maternal Grandmother    • No Known Problems Maternal Grandfather    • Diabetes Paternal Grandmother    • Heart disease Paternal Grandmother    • Diabetes Paternal Grandfather    • Heart disease Paternal Grandfather    • No Known Problems Son      I have reviewed and agree with the history as documented.     E-Cigarette/Vaping   • E-Cigarette Use Never User      E-Cigarette/Vaping Substances   • Nicotine No    • THC No    • CBD No    • Flavoring No    • Other No    • Unknown No      Social History     Tobacco Use   • Smoking status: Former     Packs/day: 0.50     Years: 20.00 Total pack years: 10.00     Types: Cigarettes     Start date: 1986     Quit date: 2010     Years since quittin.7   • Smokeless tobacco: Never   Vaping Use   • Vaping Use: Never used   Substance Use Topics   • Alcohol use: Yes     Alcohol/week: 1.0 standard drink of alcohol     Types: 1 Glasses of wine per week   • Drug use: Not Currently     Types: Marijuana       Review of Systems   Constitutional: Negative for activity change, appetite change, chills and fever. HENT: Negative for congestion, postnasal drip, rhinorrhea, sinus pressure, sinus pain, sore throat and tinnitus. Eyes: Negative for photophobia and visual disturbance. Respiratory: Negative for cough, chest tightness and shortness of breath. Cardiovascular: Negative for chest pain and palpitations. Gastrointestinal: Negative for abdominal pain, constipation, diarrhea, nausea and vomiting. Genitourinary: Negative for difficulty urinating, dysuria, flank pain, frequency and urgency. Musculoskeletal: Positive for arthralgias. Negative for back pain, gait problem, neck pain and neck stiffness. Skin: Negative for pallor and rash. Allergic/Immunologic: Negative for environmental allergies and food allergies. Neurological: Negative for dizziness, weakness, numbness and headaches. Psychiatric/Behavioral: Negative for confusion. All other systems reviewed and are negative. Physical Exam  Physical Exam  Vitals and nursing note reviewed. Constitutional:       General: She is awake. Appearance: Normal appearance. She is well-developed and normal weight. She is not ill-appearing, toxic-appearing or diaphoretic. Comments: /68 (BP Location: Right arm)   Pulse 73   Temp 98 °F (36.7 °C) (Oral)   Resp 18   LMP 2023   SpO2 99%      HENT:      Head: Normocephalic and atraumatic. Jaw: There is normal jaw occlusion.       Right Ear: Hearing, tympanic membrane and external ear normal. No decreased hearing noted. No drainage, swelling or tenderness. No mastoid tenderness. Left Ear: Hearing, tympanic membrane and external ear normal. No decreased hearing noted. No drainage, swelling or tenderness. No mastoid tenderness. Nose: Nose normal.      Mouth/Throat:      Lips: Pink. Mouth: Mucous membranes are moist.      Pharynx: Oropharynx is clear. Uvula midline. Eyes:      General: Lids are normal. Vision grossly intact. Gaze aligned appropriately. Right eye: No discharge. Left eye: No discharge. Extraocular Movements: Extraocular movements intact. Conjunctiva/sclera: Conjunctivae normal.      Pupils: Pupils are equal, round, and reactive to light. Neck:      Vascular: No JVD. Trachea: Trachea and phonation normal. No tracheal tenderness or tracheal deviation. Cardiovascular:      Rate and Rhythm: Normal rate and regular rhythm. Pulses: Normal pulses. Radial pulses are 2+ on the right side and 2+ on the left side. Posterior tibial pulses are 2+ on the right side and 2+ on the left side. Heart sounds: Normal heart sounds. Pulmonary:      Effort: Pulmonary effort is normal.      Breath sounds: Normal breath sounds and air entry. No decreased breath sounds. Abdominal:      Palpations: Abdomen is not rigid. Musculoskeletal:         General: Normal range of motion. Cervical back: Full passive range of motion without pain, normal range of motion and neck supple. No rigidity. No spinous process tenderness or muscular tenderness. Normal range of motion. Right ankle: Normal.      Right Achilles Tendon: Normal.      Left ankle: Normal.      Left Achilles Tendon: Normal.      Right foot: Tenderness present.         Feet:       Comments: Passive ROM intact  Upper and lower extremity 5/5 bilaterally  Neurovascularly intact  No grinding or clicking of joints     Feet:      Right foot:      Toenail Condition: Right toenails are normal. Left foot:      Toenail Condition: Left toenails are normal.   Lymphadenopathy:      Head:      Right side of head: No submental, submandibular, tonsillar, preauricular, posterior auricular or occipital adenopathy. Left side of head: No submental, submandibular, tonsillar, preauricular, posterior auricular or occipital adenopathy. Cervical: No cervical adenopathy. Right cervical: No superficial, deep or posterior cervical adenopathy. Left cervical: No superficial, deep or posterior cervical adenopathy. Skin:     General: Skin is warm. Capillary Refill: Capillary refill takes less than 2 seconds. Findings: No rash. Neurological:      General: No focal deficit present. Mental Status: She is alert and oriented to person, place, and time. Mental status is at baseline. GCS: GCS eye subscore is 4. GCS verbal subscore is 5. GCS motor subscore is 6. Sensory: No sensory deficit. Deep Tendon Reflexes: Reflexes are normal and symmetric. Reflex Scores:       Patellar reflexes are 2+ on the right side and 2+ on the left side. Psychiatric:         Attention and Perception: Attention normal.         Mood and Affect: Mood normal.         Speech: Speech normal.         Behavior: Behavior normal. Behavior is cooperative. Thought Content:  Thought content normal.         Judgment: Judgment normal.         Vital Signs  ED Triage Vitals   Temperature Pulse Respirations Blood Pressure SpO2   09/29/23 2250 09/29/23 2250 09/29/23 2250 09/29/23 2250 09/29/23 2250   98 °F (36.7 °C) 73 18 150/68 99 %      Temp Source Heart Rate Source Patient Position - Orthostatic VS BP Location FiO2 (%)   09/29/23 2250 09/29/23 2250 09/29/23 2250 09/29/23 2250 --   Oral Monitor Lying Right arm       Pain Score       09/29/23 2328       5           Vitals:    09/29/23 2250   BP: 150/68   Pulse: 73   Patient Position - Orthostatic VS: Lying         Visual Acuity      ED Medications  Medications   ketorolac (TORADOL) injection 15 mg (15 mg Intramuscular Given 23)   acetaminophen (TYLENOL) tablet 975 mg (975 mg Oral Given 23)       Diagnostic Studies  Results Reviewed     None                 XR foot 3+ views RIGHT    (Results Pending)              Procedures  Procedures         ED Course                               SBIRT 22yo+    Flowsheet Row Most Recent Value   Initial Alcohol Screen: US AUDIT-C     1. How often do you have a drink containing alcohol? 0 Filed at: 2023   2. How many drinks containing alcohol do you have on a typical day you are drinking? 0 Filed at: 2023   3a. Male UNDER 65: How often do you have five or more drinks on one occasion? 0 Filed at: 2023   3b. FEMALE Any Age, or MALE 65+: How often do you have 4 or more drinks on one occassion? 0 Filed at: 2023   Audit-C Score 0 Filed at: 2023   TIMI: How many times in the past year have you. .. Used an illegal drug or used a prescription medication for non-medical reasons? Never Filed at: 2023                    Medical Decision Making  Patient is a 21-year-old female with a history of GERD, osteoarthritis of ankles and knees, surgical history of  section, cholecystectomy that presents emerged apartment with dull aching persistent right-sided foot pain for 1 day. Patient denies associated symptoms.     Patient hemodynamically stable and afebrile  No sirs  Imaging right foot x-ray with no acute osseous abnormality and initial read  Delivered multimodal pain control in the emergency department; patient demonstrates decrease in presenting right foot pain ED symptomatology status post medication delivery  Prescribed naproxen and counseled patient medication administration and side effects  Ace wrap to right foot, patient denies offered crutches  Follow-up with podiatry  Follow-up with PCP  Follow up with emergency department if symptoms persist or exacerbate  Patient demonstrates verbal understanding of all clinical imaging findings, discharge instructions, follow-up, and verbally agrees with current treatment plan with teach back    *Due to voice recognition software, sound alike and misspelled words may be contained in the documentation*      Sprain of right foot, initial encounter: acute illness or injury  Amount and/or Complexity of Data Reviewed  Radiology: ordered and independent interpretation performed. Decision-making details documented in ED Course. Risk  OTC drugs. Prescription drug management. Disposition  Final diagnoses:   Sprain of right foot, initial encounter     Time reflects when diagnosis was documented in both MDM as applicable and the Disposition within this note     Time User Action Codes Description Comment    9/29/2023 11:33 PM Theo Grayson Sprain of right foot, initial encounter       ED Disposition     ED Disposition   Discharge    Condition   Stable    Date/Time   Fri Sep 29, 2023 11:55 PM    Comment   Jesus Tony discharge to home/self care.                Follow-up Information     Follow up With Specialties Details Why Contact Info Additional [address] [facility],  Family Medicine   [address] [facility]  [address]  [address] [phone]       [address] [facility] Emergency Department Emergency Medicine   [address]  [phone]  [phone] [address] [facility] Emergency Department, [facility], [address] [place] [address] Road,[address] Floor, [address] [phone]  [address] [place] [address], [address] [place] [address], [address] [place] [address]          Discharge Medication List as of 9/29/2023 11:55 PM      START taking these medications    Details   naproxen (Naprosyn) 500 mg tablet Take 1 tablet (500 mg total) by mouth 2 (two) times a day with meals for 4 days, Starting Fri 9/29/2023, Until Tue 10/3/2023, Normal         CONTINUE these medications which have NOT CHANGED    Details   ergocalciferol (ERGOCALCIFEROL) 1.25 MG (61344 UT) capsule Take 1 capsule (50,000 Units total) by mouth once a week for 12 doses, Starting Thu 8/31/2023, Until Fri 11/17/2023, Normal      ketoconazole (NIZORAL) 2 % shampoo Shampoo twice a week for 8 weeks, then as needed. Leave on for 5 minutes before rinsing. Rx per Derm., Normal             No discharge procedures on file.     PDMP Review       Value Time User    PDMP Reviewed  Yes 6/4/2021 12:22 PM Katerin Jackson DO          ED Provider  Electronically Signed by           Vera Estevez PA-C  09/30/23 2288

## 2023-10-02 ENCOUNTER — OFFICE VISIT (OUTPATIENT)
Dept: PODIATRY | Facility: CLINIC | Age: 53
End: 2023-10-02
Payer: COMMERCIAL

## 2023-10-02 VITALS
SYSTOLIC BLOOD PRESSURE: 135 MMHG | HEIGHT: 64 IN | HEART RATE: 65 BPM | RESPIRATION RATE: 18 BRPM | BODY MASS INDEX: 42.43 KG/M2 | DIASTOLIC BLOOD PRESSURE: 83 MMHG

## 2023-10-02 DIAGNOSIS — S93.421A SPRAIN OF DELTOID LIGAMENT OF RIGHT ANKLE, INITIAL ENCOUNTER: Primary | ICD-10-CM

## 2023-10-02 PROCEDURE — 99202 OFFICE O/P NEW SF 15 MIN: CPT | Performed by: PODIATRIST

## 2023-10-02 NOTE — PROGRESS NOTES
[address] [facility] 48 y.o. female MRN: [id number]  Encounter: [id number]      Assessment/Plan        Diagnoses and all orders for this visit:    Sprain of deltoid ligament of right ankle, initial encounter         Plan:  · Patient was seen/examined. All questions and concerns addressed  · Patient's prior right foot radiographs were reviewed (9/29/23). No fractures or dislocations noted. · Patient educated on the etiology of ankle sprains and the importance of wearing supportive shoe gear. · Patient advised to continue taking over the counter Advil as needed for pain relief. · Patient advised to follow up as needed or call for anything acute. Dr. Lorraine Byrnes was present during this entire procedure. History of Present Illness     HPI:  Ms. Mireya Christine presents today with pain and tenderness in her right ankle. She states that last week at work she switched positions and did more side to side activity and noticed the next day significant pain in her right ankle. She states she could barely put any weight on it the day after. Ms. Mireya Christine expresses that after the injury she went to the ED (9/29/2023) where they took xrays of the right foot and told her there were no fractures. She states they gave her Torodol and sent her home. Since the Ed, the patient reports her pain has been more controlled since the injury. She reports taking 400mg Advil everyday which has been significantly helping. She rates her pain a 3/10 on a pain scale today. She is ambulating today in sneakers. She denies nausea, vomiting, fever, chills, shortness of breath, chest pain. Review of Systems   Constitutional: Negative. HENT: Negative. Eyes: Negative. Respiratory: Negative. Cardiovascular: Negative. Gastrointestinal: Negative. Musculoskeletal: Pain in right ankle  Skin: Negative   Neurological: Negative.        Historical Information   Past Medical History:   Diagnosis Date   • Arthritis • Class 2 severe obesity with serious comorbidity and body mass index (BMI) of 39.0 to 39.9 in adult     • COVID-19 2020   • Diabetes mellitus ([address])     gestational   • Diverticulosis 2022   • Generalized anxiety disorder 2015   • GERD (gastroesophageal reflux disease)    • History of gestational diabetes ,    • IFG (impaired fasting glucose)    • Obesity    • Osteoarthritis     Osteoarthritis (Ankles & Knees)   • Other hyperlipidemia 2021   • Seborrheic dermatitis 2023   • Vitamin D deficiency 2019     Past Surgical History:   Procedure Laterality Date   • ARTHROSCOPY ANKLE Right 2016    OA   •  SECTION   &    • CHOLECYSTECTOMY  2021   • COLONOSCOPY     • EGD  2023    Gastritis   • CT LAPAROSCOPY SURG CHOLECYSTECTOMY N/A 2021    Procedure: LAPAROSCOPIC POSSIBLE OPEN CHOLECYSTECTOMY;  Surgeon: Alida Whitaker MD;  Location:  MAIN OR;  Service: General   • TONSILLECTOMY       Social History   Social History     Substance and Sexual Activity   Alcohol Use Yes   • Alcohol/week: 1.0 standard drink of alcohol   • Types: 1 Glasses of wine per week     Social History     Substance and Sexual Activity   Drug Use Not Currently   • Types: Marijuana     Social History     Tobacco Use   Smoking Status Former   • Packs/day: 0.50   • Years: 20.00   • Total pack years: 10.00   • Types: Cigarettes   • Start date: 1986   • Quit date: 2010   • Years since quittin.7   Smokeless Tobacco Never     Family History:   Family History   Problem Relation Age of Onset   • Diabetes Father 36        Type Unknown   • Heart attack Father 32   • Coronary artery disease Father 32   • Heart disease Father    • Heart defect Mother         R to L Shunt   • Muscular dystrophy Mother 32   • Heart disease Mother    • No Known Problems Brother    • No Known Problems Son    • Diabetes Maternal Grandmother    • Kidney disease Maternal Grandmother    • No Known Problems Maternal Grandfather    • Diabetes Paternal Grandmother    • Heart disease Paternal Grandmother    • Diabetes Paternal Grandfather    • Heart disease Paternal Grandfather    • No Known Problems Son        Meds/Allergies   (Not in a hospital admission)    No Known Allergies    Objective     Current Vitals:   Blood Pressure: 135/83 (10/02/23 0944)  Pulse: 65 (10/02/23 0944)  Respirations: 18 (10/02/23 0944)  Height: 5' 4" (162.6 cm) (10/02/23 0944)        /83   Pulse 65   Resp 18   Ht 5' 4" (1.626 m)   LMP 09/08/2023   BMI 42.43 kg/m²       Lower Extremity Exam:    Vascular:   Right foot DP/PT +2  Left foot DP/PT +2  Capillary refill < 3 seconds   Pedal hair growth noted   There is trace lower extremity edema bilateral.    Musculoskeletal:   There is 4/5 strength throughout the right lower extremity. There is 5/5 strength throughout the left lower extremity. full ankle range of motion with well maintained subtalar range of motion.     There is moderate tenderness over the deltoid ligaments on the right foot     No gross deformities noted     Biomechanical Exam of LE:  Hip ROM WNL Bilateral, external and internal rotation about equal at 45° b/l  Knee ROM WNL Bilateral, no hyperextension noted b/l, no genu varum/valgum noted b/l  Ankle dorsiflexion with knee extended is limited and unable to get pass vertical on right and limited and unable to get pass vertical on left  Ankle dorsiflexion with knee flexed is limited and unable to get pass vertical on right and limited and unable to get pass vertical on left  Malleolar positioning is WNL b/l  STJ ROM WNL b/l, heel inversion is approximately 20° on right and 20° on left; heel eversion is approximately 10° on right and 10° on left; neutral calcaneal stance position is 0°   1st ray ROM WNL b/l, able to dorsiflex/plantarflex b/l  Tibial varum is 0° b/l, Resting calcaneal stance position is valgus and approximately 2° on right and varus and approximately 1° on left  Gait analysis: WNL  Gross deformity noted: Mild pronation on the right and mild supination on the left     Neurological:   Sensation to 5.07 Butler-Maximo nylon filament: negative bilaterally  Vibratory sense to distal Foot  negative bilaterally  Sharp/Dull sense is diminished to sensation  Bilaterally  Positive Tinel's sign of the Tibial nerve right foot     Dermatology:   Skin Condition:  normal  There is not evidence of macerated tissue between toe spaces. Nail Exam: normal nails without lesions.   Open ulcerations: No  Calluses: No

## 2023-10-04 ENCOUNTER — OFFICE VISIT (OUTPATIENT)
Dept: CARDIOLOGY CLINIC | Facility: CLINIC | Age: 53
End: 2023-10-04
Payer: COMMERCIAL

## 2023-10-04 VITALS
BODY MASS INDEX: 42.68 KG/M2 | WEIGHT: 250 LBS | OXYGEN SATURATION: 98 % | DIASTOLIC BLOOD PRESSURE: 64 MMHG | SYSTOLIC BLOOD PRESSURE: 118 MMHG | HEIGHT: 64 IN | HEART RATE: 70 BPM

## 2023-10-04 DIAGNOSIS — E66.01 MORBID OBESITY (HCC): ICD-10-CM

## 2023-10-04 DIAGNOSIS — Z82.49 FAMILY HISTORY OF EARLY CAD: ICD-10-CM

## 2023-10-04 DIAGNOSIS — E78.49 OTHER HYPERLIPIDEMIA: ICD-10-CM

## 2023-10-04 DIAGNOSIS — Z01.810 PRE-OPERATIVE CARDIOVASCULAR EXAMINATION: Primary | ICD-10-CM

## 2023-10-04 PROCEDURE — 99204 OFFICE O/P NEW MOD 45 MIN: CPT | Performed by: INTERNAL MEDICINE

## 2023-10-04 PROCEDURE — 93000 ELECTROCARDIOGRAM COMPLETE: CPT | Performed by: INTERNAL MEDICINE

## 2023-10-04 NOTE — PROGRESS NOTES
[facility] [id number] [phone]                                            Cardiology Office Consult  Jennifer Alvarez, 48 y.o. female  YOB: 1970  MRN: [id number] Encounter: [id number]      PCP - Lia Marcelino DO  Referring Provider - Keira Woods MD    No chief complaint on file. Assessment  1. Former smoker in 2010  2. Hyperlipidemia  3. Morbid obesity, Body mass index is 42.91 kg/m². Plan  Pre-operative cardiovascular evaluation  · Planned procedure: Laparoscopic gastric sleeve surgery (Dr. Yessy Singh)  · Active cardiac complaints: None  · Cardiac co-morbidities: None  · Functional status: Active, able to go up 1-2 flights of stairs without chest pain or shortness of breath. Mainly limited by musculoskeletal issues.   · No h/o CAD, heart failure  · Vitals - reviewed and stable  · ECG -no acute ST-T changes  · Check echocardiogram  · With her family history of early CAD, recommend checking exercise ECG stress test   · If LV function normal, and no evidence of ischemia on stress test, then can plan to proceed with surgery as low to moderate cardiac risk     (Addendum 10/5/23 - 2:50 pm  Post visit, on the next day patient sent me a message stating that she is having issues with the right ankle - limiting her from exercising adequately for stress test. She feels that she may not be able to exercise adequately for the stress test and as a result requested that the test be switched to a pharmacological stress test.  - Will switch to nuclear lexiscan stress test)    Hyperlipidemia, Family history of early CAD    · Her father had multiple heart attacks at a young age and had multiple bypass surgeries and stents  · She states some of this was with him not taking care of himself   · She reports having mainly gained weight after quitting smoking 10+ years ago, but has been working on weight loss  · With dietary modifications, high cholesterol levels have been improving over the last 4 years, and will likely improve further after bariatric surgery  · Check exercise ECG stress test due to risk stratify this further prior to surgery  · She will benefit from a CT coronary calcium score in the future - will consider same in future after her bariatric surgery      Results for orders placed or performed in visit on 10/04/23   POCT ECG    Impression    Normal sinus rhythm  Low voltage QRS  Borderline ECG       Orders Placed This Encounter   Procedures   • Stress test only, exercise   • POCT ECG   • Echo complete w/ contrast if indicated     Return in about 1 year (around 10/4/2024), or if symptoms worsen or fail to improve. History of Present Illness   48 y.o. female comes in as a new patient for consultation regarding preoperative cardiovascular examination prior to upcoming laparoscopic bariatric surgery. She was not a complaints of chest pain, shortness of breath, palpitations or dizziness. She remains active and recently has 3month-old grandson. She wants to try to get healthier and be more active while playing with her grandson. She has been working on weight loss for many years, but has continued to have difficulty and as a result she recently decided to pursue bariatric surgical options. No known prior history of coronary artery disease or heart failure. She does report having an extensive family history and as a result reports having seen multiple cardiologist in the past although has not seen anybody in 10+ years. Family history  Father - heart attack at 28, CABG on 2 occasions and multiple PCI,  at 48 of heart attack. HLD, DM2. Mother -  at 76. CHF. "Hole in the heart: - wasn't being oxygenated.  No known CAD  Siblings: 1 older brother - no known CAD or heart problems      Historical Information   Past Medical History:   Diagnosis Date   • Arthritis    • Class 2 severe obesity with serious comorbidity and body mass index (BMI) of 39.0 to 39.9 in adult     • COVID-19 2020   • Diabetes mellitus ([address])     gestational   • Diverticulosis 2022   • Generalized anxiety disorder 2015   • GERD (gastroesophageal reflux disease)    • History of gestational diabetes ,    • IFG (impaired fasting glucose)    • Obesity    • Osteoarthritis     Osteoarthritis (Ankles & Knees)   • Other hyperlipidemia 2021   • Seborrheic dermatitis 2023   • Vitamin D deficiency 2019     Past Surgical History:   Procedure Laterality Date   • ARTHROSCOPY ANKLE Right 2016    OA   •  SECTION   &    • CHOLECYSTECTOMY  2021   • COLONOSCOPY     • EGD  2023    Gastritis   • MA LAPAROSCOPY SURG CHOLECYSTECTOMY N/A 2021    Procedure: LAPAROSCOPIC POSSIBLE OPEN CHOLECYSTECTOMY;  Surgeon: Jennifer Womack MD;  Location:  MAIN OR;  Service: General   • TONSILLECTOMY       Family History   Problem Relation Age of Onset   • Diabetes Father 36        Type Unknown   • Heart attack Father 32   • Coronary artery disease Father 32   • Heart disease Father    • Heart defect Mother         R to L Shunt   • Muscular dystrophy Mother 32   • Heart disease Mother    • No Known Problems Brother    • No Known Problems Son    • Diabetes Maternal Grandmother    • Kidney disease Maternal Grandmother    • No Known Problems Maternal Grandfather    • Diabetes Paternal Grandmother    • Heart disease Paternal Grandmother    • Diabetes Paternal Grandfather    • Heart disease Paternal Grandfather    • No Known Problems Son      Current Outpatient Medications on File Prior to Visit   Medication Sig Dispense Refill   • ergocalciferol (ERGOCALCIFEROL) 1.25 MG (18063 UT) capsule Take 1 capsule (50,000 Units total) by mouth once a week for 12 doses 12 capsule 0   • ketoconazole (NIZORAL) 2 % shampoo Shampoo twice a week for 8 weeks, then as needed. Leave on for 5 minutes before rinsing. Rx per Derm.  120 mL 0   • naproxen (Naprosyn) 500 mg tablet Take 1 tablet (500 mg total) by mouth 2 (two) times a day with meals for 4 days 8 tablet 0     No current facility-administered medications on file prior to visit. No Known Allergies  Social History     Socioeconomic History   • Marital status: /Civil Union     Spouse name: None   • Number of children: 2   • Years of education: None   • Highest education level: None   Occupational History   • Occupation: document control    Tobacco Use   • Smoking status: Former     Packs/day: 0.50     Years: 20.00     Total pack years: 10.00     Types: Cigarettes     Start date: 1986     Quit date: 2010     Years since quittin.7   • Smokeless tobacco: Never   Vaping Use   • Vaping Use: Never used   Substance and Sexual Activity   • Alcohol use: Yes     Alcohol/week: 1.0 standard drink of alcohol     Types: 1 Glasses of wine per week   • Drug use: Not Currently     Types: Marijuana   • Sexual activity: Yes     Partners: Male     Birth control/protection: Condom Male   Other Topics Concern   • None   Social History Narrative        Lives , 2 sons    2 children - 2 sons     at [address] [place] [address] Strain: [address]  (2023)    Overall Financial Resource Strain (CARDIA)    • Difficulty of Paying Living Expenses: Not hard at all   Food Insecurity: No Food Insecurity (2023)    Hunger Vital Sign    • Worried About Running Out of Food in the Last Year: Never true    • Ran Out of Food in the Last Year: Never true   Transportation Needs: No Transportation Needs (2023)    PRAPARE - Transportation    • Lack of Transportation (Medical): No    • Lack of Transportation (Non-Medical):  No   Physical Activity: Not on file   Stress: Not on file   Social Connections: Not on file   Intimate Partner Violence: Not on file   Housing Stability: Low Risk  (2023)    Housing Stability Vital Sign    • Unable to Pay for Housing in the Last Year: No    • Number of Places Lived in the Last Year: 1    • Unstable Housing in the Last Year: No        Review of Systems   All other systems reviewed and are negative. Vitals:  Vitals:    10/04/23 1618   BP: 118/64   BP Location: Right arm   Patient Position: Sitting   Cuff Size: Standard   Pulse: 70   SpO2: 98%   Weight: 113 kg (250 lb)   Height: 5' 4" (1.626 m)     BMI - Body mass index is 42.91 kg/m². Wt Readings from Last 7 Encounters:   10/04/23 113 kg (250 lb)   09/29/23 112 kg (247 lb 3.2 oz)   09/06/23 110 kg (243 lb)   08/31/23 110 kg (243 lb 8 oz)   08/31/23 111 kg (245 lb)   08/31/23 111 kg (245 lb 9.6 oz)   08/29/23 111 kg (244 lb)       Physical Exam  Vitals and nursing note reviewed. Constitutional:       General: She is not in acute distress. Appearance: Normal appearance. She is well-developed. She is obese. She is not ill-appearing. HENT:      Head: Normocephalic and atraumatic. Nose: No congestion. Eyes:      General: No scleral icterus. Conjunctiva/sclera: Conjunctivae normal.   Neck:      Vascular: No carotid bruit or JVD. Cardiovascular:      Rate and Rhythm: Normal rate and regular rhythm. Pulses: Normal pulses. Heart sounds: Normal heart sounds. No murmur heard. No friction rub. No gallop. Pulmonary:      Effort: Pulmonary effort is normal. No respiratory distress. Breath sounds: Normal breath sounds. No rales. Abdominal:      General: There is no distension. Palpations: Abdomen is soft. Tenderness: There is no abdominal tenderness. Musculoskeletal:         General: No swelling or tenderness. Cervical back: Neck supple. Right lower leg: No edema. Left lower leg: No edema. Skin:     General: Skin is warm. Neurological:      General: No focal deficit present. Mental Status: She is alert and oriented to person, place, and time. Mental status is at baseline.    Psychiatric:         Mood and Affect: Mood normal.         Behavior: Behavior normal.         Thought Content: Thought content normal.           Labs:  CBC:   Lab Results   Component Value Date    WBC 7.10 08/30/2023    RBC 4.09 08/30/2023    HGB 12.2 08/30/2023    HCT 37.5 08/30/2023    MCV 92 08/30/2023     08/30/2023    RDW 13.9 08/30/2023       CMP:   Lab Results   Component Value Date    K 4.1 08/30/2023     08/30/2023    CO2 25 08/30/2023    BUN 9 08/30/2023    CREATININE 0.71 08/30/2023    EGFR 99 08/30/2023    CALCIUM 9.6 08/30/2023    AST 16 08/30/2023    ALT 15 08/30/2023    ALKPHOS 84 08/30/2023       Magnesium:  No results found for: "MG"    Lipid Profile:   Lab Results   Component Value Date    HDL 57 08/30/2023    TRIG 71 08/30/2023    LDLCALC 109 (H) 08/30/2023       Thyroid Studies:   Lab Results   Component Value Date    DCH5EZUWDSNH 1.607 08/30/2023       A1c:  No components found for: "HGA1C"    INR:  No results found for: "INR"5    Imaging: XR foot 3+ views RIGHT    Result Date: 9/30/2023  Narrative: RIGHT FOOT INDICATION:   medial aspect of right foot; inferior to medial malleolus. COMPARISON:  None VIEWS:  XR FOOT 3+ VW RIGHT FINDINGS: There is no acute fracture or dislocation. Calcaneal spur. No lytic or blastic osseous lesion. Soft tissues are unremarkable. Impression: No acute osseous abnormality. Workstation performed: [id number]     EGD    Result Date: 9/6/2023  Narrative: Table formatting from the original result was not included. [address] [facility] [address] [place] [address] [phone] DATE OF SERVICE: 9/06/23 PHYSICIAN(S): Attending: Phyllis Perry MD Fellow: Vasyl Santiago MD INDICATION: Bariatric surgery status POST-OP DIAGNOSIS: See the impression below. PREPROCEDURE: Informed consent was obtained for the procedure, including sedation. Risks of perforation, hemorrhage, adverse drug reaction and aspiration were discussed.  The patient was placed in the left lateral decubitus position. Patient was explained about the risks and benefits of the procedure. Risks including but not limited to bleeding, infection, and perforation were explained in detail. Also explained about less than 100% sensitivity with the exam and other alternatives. PROCEDURE: EGD DETAILS OF PROCEDURE: Patient was taken to the procedure room where a time out was performed to confirm correct patient and correct procedure. The patient underwent monitored anesthesia care, which was administered by an anesthesia professional. The patient's blood pressure, heart rate, level of consciousness, respirations and oxygen were monitored throughout the procedure. The scope was advanced to the second part of the duodenum. Retroflexion was performed in the fundus. Prior to the procedure, the patient's H. Pylori status was unknown. The patient experienced no blood loss. The procedure was not difficult. The patient tolerated the procedure well. There were no apparent adverse events. ANESTHESIA INFORMATION: ASA: III Anesthesia Type: IV Sedation with Anesthesia MEDICATIONS: No administrations occurring from 1038 to 1049 on 09/06/23 FINDINGS: Mild, generalized erythematous mucosa in the stomach; performed 6 cold forceps biopsies to rule out H. pylori Regular Z-line 40 cm from the incisors SPECIMENS: ID Type Source Tests Collected by Time Destination 1 : r/o h pylori Tissue Stomach TISSUE EXAM Razia Hdez MD 9/6/2023 1046      Impression: Gastritis with mild erythematous mucosa in the stomach; performed cold forceps biopsies RECOMMENDATION: Continue with the bariatric program process and follow up in the office. Biopsy results pending. Razia Hdez MD       Cardiac testing:   No results found for this or any previous visit. No results found for this or any previous visit. No results found for this or any previous visit. No results found for this or any previous visit.       XR foot 3+ views RIGHT  Narrative: RIGHT FOOT    INDICATION:   medial aspect of right foot; inferior to medial malleolus. COMPARISON:  None    VIEWS:  XR FOOT 3+ VW RIGHT    FINDINGS:    There is no acute fracture or dislocation. Calcaneal spur. No lytic or blastic osseous lesion. Soft tissues are unremarkable. Impression: No acute osseous abnormality.     Workstation performed: [id number]

## 2023-10-05 ENCOUNTER — TELEPHONE (OUTPATIENT)
Age: 53
End: 2023-10-05

## 2023-10-05 NOTE — TELEPHONE ENCOUNTER
Caller: Shayy Buck w/ [facility]     Doctor: Lachman     Reason for call: Calling about PT referral but it is from 2022     Call back#: n/a

## 2023-10-06 ENCOUNTER — APPOINTMENT (OUTPATIENT)
Dept: LAB | Facility: AMBULARY SURGERY CENTER | Age: 53
End: 2023-10-06
Payer: COMMERCIAL

## 2023-10-06 DIAGNOSIS — Z01.818 PRE-OPERATIVE EXAM: ICD-10-CM

## 2023-10-06 DIAGNOSIS — K76.0 NAFLD (NONALCOHOLIC FATTY LIVER DISEASE): ICD-10-CM

## 2023-10-06 PROCEDURE — 82977 ASSAY OF GGT: CPT

## 2023-10-06 PROCEDURE — 82247 BILIRUBIN TOTAL: CPT

## 2023-10-06 PROCEDURE — 36415 COLL VENOUS BLD VENIPUNCTURE: CPT

## 2023-10-06 PROCEDURE — 82172 ASSAY OF APOLIPOPROTEIN: CPT

## 2023-10-06 PROCEDURE — 83010 ASSAY OF HAPTOGLOBIN QUANT: CPT

## 2023-10-06 PROCEDURE — 84460 ALANINE AMINO (ALT) (SGPT): CPT

## 2023-10-06 PROCEDURE — 82465 ASSAY BLD/SERUM CHOLESTEROL: CPT

## 2023-10-06 PROCEDURE — 84450 TRANSFERASE (AST) (SGOT): CPT

## 2023-10-06 PROCEDURE — 84478 ASSAY OF TRIGLYCERIDES: CPT

## 2023-10-06 PROCEDURE — 82947 ASSAY GLUCOSE BLOOD QUANT: CPT

## 2023-10-06 PROCEDURE — 83883 ASSAY NEPHELOMETRY NOT SPEC: CPT

## 2023-10-10 LAB
A2 MACROGLOB SERPL-MCNC: 165 MG/DL (ref 110–276)
ALT SERPL W P-5'-P-CCNC: 21 IU/L (ref 0–40)
APO A-I SERPL-MCNC: 157 MG/DL (ref 116–209)
AST SERPL W P-5'-P-CCNC: 17 IU/L (ref 0–40)
BILIRUB SERPL-MCNC: <0.1 MG/DL (ref 0–1.2)
CHOLEST SERPL-MCNC: 223 MG/DL (ref 100–199)
FIBROSIS SCORING:: ABNORMAL
FIBROSIS STAGE SERPL QL: ABNORMAL
GGT SERPL-CCNC: 11 IU/L (ref 0–60)
GLUCOSE SERPL-MCNC: 96 MG/DL (ref 70–99)
HAPTOGLOB SERPL-MCNC: 122 MG/DL (ref 33–346)
INTERPRETATION: ABNORMAL
LABORATORY COMMENT REPORT: ABNORMAL
LIVER FIBR SCORE SERPL CALC.FIBROSURE: 0.03 (ref 0–0.21)
NASH GRADE SERPL QL: ABNORMAL
NASH SCORE SERPL: 0 (ref 0–0.25)
REF LAB TEST METHOD: ABNORMAL
SL AMB NASH SCORING: ABNORMAL
SL AMB STEATOSIS GRADE: ABNORMAL
SL AMB STEATOSIS SCORE: 0.35 (ref 0–0.4)
STEATOSIS SCORING: ABNORMAL
TEST PERFORMANCE INFO SPEC: ABNORMAL
TRIGL SERPL-MCNC: 120 MG/DL (ref 0–149)

## 2023-10-12 ENCOUNTER — HOSPITAL ENCOUNTER (OUTPATIENT)
Facility: HOSPITAL | Age: 53
End: 2023-10-12
Attending: INTERNAL MEDICINE
Payer: COMMERCIAL

## 2023-10-12 ENCOUNTER — HOSPITAL ENCOUNTER (OUTPATIENT)
Facility: HOSPITAL | Age: 53
Discharge: HOME | End: 2023-10-12
Attending: INTERNAL MEDICINE

## 2023-10-12 ENCOUNTER — HOSPITAL ENCOUNTER (OUTPATIENT)
Dept: NON INVASIVE DIAGNOSTICS | Facility: HOSPITAL | Age: 53
Discharge: HOME | End: 2023-10-12
Attending: INTERNAL MEDICINE
Payer: COMMERCIAL

## 2023-10-12 VITALS — BODY MASS INDEX: 42.68 KG/M2 | HEIGHT: 64 IN | WEIGHT: 250 LBS

## 2023-10-12 DIAGNOSIS — Z82.49 FAMILY HISTORY OF EARLY CAD: ICD-10-CM

## 2023-10-12 DIAGNOSIS — Z01.810 PRE-OPERATIVE CARDIOVASCULAR EXAMINATION: ICD-10-CM

## 2023-10-12 DIAGNOSIS — E66.01 MORBID OBESITY (HCC): ICD-10-CM

## 2023-10-12 LAB
CHEST PAIN STATEMENT: NORMAL
MAX DIASTOLIC BP: 70 MMHG
MAX HEART RATE: 126 BPM
MAX HR PERCENT: 74 %
MAX HR: 126 BPM
MAX PREDICTED HEART RATE: 169 BPM
MAX. SYSTOLIC BP: 144 MMHG
NUC STRESS EJECTION FRACTION: 79 %
PROTOCOL NAME: NORMAL
RATE PRESSURE PRODUCT: NORMAL
REASON FOR TERMINATION: NORMAL
SL CV REST NUCLEAR ISOTOPE DOSE: 15 MCI
SL CV STRESS NUCLEAR ISOTOPE DOSE: 45.9 MCI
SL CV STRESS RECOVERY BP: NORMAL MMHG
SL CV STRESS RECOVERY HR: 86 BPM
SL CV STRESS RECOVERY O2 SAT: 99 %
STRESS ANGINA INDEX: 0
STRESS BASELINE BP: NORMAL MMHG
STRESS BASELINE HR: 75 BPM
STRESS O2 SAT REST: 99 %
STRESS PEAK HR: 126 BPM
STRESS POST ESTIMATED WORKLOAD: 1.5 METS
STRESS POST O2 SAT PEAK: 99 %
STRESS POST PEAK BP: 144 MMHG
STRESS/REST PERFUSION RATIO: 1.07
TARGET HR FORMULA: NORMAL
TEST INDICATION: NORMAL
TIME IN EXERCISE PHASE: NORMAL

## 2023-10-12 PROCEDURE — A9502 TC99M TETROFOSMIN: HCPCS

## 2023-10-12 PROCEDURE — 93018 CV STRESS TEST I&R ONLY: CPT | Performed by: INTERNAL MEDICINE

## 2023-10-12 PROCEDURE — G1004 CDSM NDSC: HCPCS

## 2023-10-12 PROCEDURE — 78452 HT MUSCLE IMAGE SPECT MULT: CPT | Performed by: INTERNAL MEDICINE

## 2023-10-12 PROCEDURE — 93017 CV STRESS TEST TRACING ONLY: CPT

## 2023-10-12 PROCEDURE — 93016 CV STRESS TEST SUPVJ ONLY: CPT | Performed by: INTERNAL MEDICINE

## 2023-10-12 PROCEDURE — 78452 HT MUSCLE IMAGE SPECT MULT: CPT

## 2023-10-12 RX ORDER — REGADENOSON 0.08 MG/ML
0.4 INJECTION, SOLUTION INTRAVENOUS ONCE
Status: COMPLETED | OUTPATIENT
Start: 2023-10-12 | End: 2023-10-12

## 2023-10-12 RX ADMIN — REGADENOSON 0.4 MG: 0.08 INJECTION, SOLUTION INTRAVENOUS at 09:19

## 2023-10-18 ENCOUNTER — HOSPITAL ENCOUNTER (OUTPATIENT)
Dept: NON INVASIVE DIAGNOSTICS | Facility: HOSPITAL | Age: 53
Discharge: HOME | End: 2023-10-18
Attending: INTERNAL MEDICINE
Payer: COMMERCIAL

## 2023-10-18 VITALS
WEIGHT: 250 LBS | DIASTOLIC BLOOD PRESSURE: 64 MMHG | HEIGHT: 64 IN | SYSTOLIC BLOOD PRESSURE: 118 MMHG | BODY MASS INDEX: 42.68 KG/M2

## 2023-10-18 DIAGNOSIS — Z01.810 PRE-OPERATIVE CARDIOVASCULAR EXAMINATION: ICD-10-CM

## 2023-10-18 DIAGNOSIS — E78.49 OTHER HYPERLIPIDEMIA: ICD-10-CM

## 2023-10-18 LAB
AORTIC ROOT: 3.1 CM
APICAL FOUR CHAMBER EJECTION FRACTION: 78 %
ASCENDING AORTA: 3.1 CM
E WAVE DECELERATION TIME: 208 MS
E/A RATIO: 1.98
FRACTIONAL SHORTENING: 39 (ref 28–44)
INTERVENTRICULAR SEPTUM IN DIASTOLE (PARASTERNAL SHORT AXIS VIEW): 1.3 CM
INTERVENTRICULAR SEPTUM: 1.3 CM (ref 0.6–1.1)
LAAS-AP2: 24.6 CM2
LAAS-AP4: 21.2 CM2
LEFT ATRIUM SIZE: 3.8 CM
LEFT ATRIUM VOLUME (MOD BIPLANE): 75 ML
LEFT ATRIUM VOLUME INDEX (MOD BIPLANE): 34.9 ML/M2
LEFT INTERNAL DIMENSION IN SYSTOLE: 2.8 CM (ref 2.1–4)
LEFT VENTRICULAR INTERNAL DIMENSION IN DIASTOLE: 4.6 CM (ref 3.5–6)
LEFT VENTRICULAR POSTERIOR WALL IN END DIASTOLE: 1 CM
LEFT VENTRICULAR STROKE VOLUME: 66 ML
LVSV (TEICH): 66 ML
MV E'TISSUE VEL-SEP: 10 CM/S
MV PEAK A VEL: 0.46 M/S
MV PEAK E VEL: 91 CM/S
MV STENOSIS PRESSURE HALF TIME: 60 MS
MV VALVE AREA P 1/2 METHOD: 3.67
RIGHT ATRIUM AREA SYSTOLE A4C: 14.6 CM2
RIGHT VENTRICLE ID DIMENSION: 3 CM
SL CV LEFT ATRIUM LENGTH A2C: 5.8 CM
SL CV LV EF: 60
SL CV PED ECHO LEFT VENTRICLE DIASTOLIC VOLUME (MOD BIPLANE) 2D: 97 ML
SL CV PED ECHO LEFT VENTRICLE SYSTOLIC VOLUME (MOD BIPLANE) 2D: 31 ML
TR MAX PG: 22 MMHG
TR PEAK VELOCITY: 2.3 M/S
TRICUSPID ANNULAR PLANE SYSTOLIC EXCURSION: 2.1 CM
TRICUSPID VALVE PEAK REGURGITATION VELOCITY: 2.34 M/S

## 2023-10-18 PROCEDURE — 93306 TTE W/DOPPLER COMPLETE: CPT

## 2023-10-26 ENCOUNTER — PREP FOR PROCEDURE (OUTPATIENT)
Dept: BARIATRICS | Facility: CLINIC | Age: 53
End: 2023-10-26

## 2023-10-26 ENCOUNTER — OFFICE VISIT (OUTPATIENT)
Dept: BARIATRICS | Facility: CLINIC | Age: 53
End: 2023-10-26

## 2023-10-26 VITALS — BODY MASS INDEX: 42.31 KG/M2 | WEIGHT: 246.5 LBS

## 2023-10-26 DIAGNOSIS — E78.2 MIXED HYPERLIPIDEMIA: ICD-10-CM

## 2023-10-26 DIAGNOSIS — E66.01 CLASS 3 SEVERE OBESITY WITH SERIOUS COMORBIDITY AND BODY MASS INDEX (BMI) OF 40.0 TO 44.9 IN ADULT, UNSPECIFIED OBESITY TYPE (HCC): Primary | ICD-10-CM

## 2023-10-26 DIAGNOSIS — K21.9 GASTROESOPHAGEAL REFLUX DISEASE, UNSPECIFIED WHETHER ESOPHAGITIS PRESENT: ICD-10-CM

## 2023-10-26 DIAGNOSIS — E66.01 MORBID OBESITY (HCC): Primary | ICD-10-CM

## 2023-10-26 PROCEDURE — RECHECK

## 2023-10-26 NOTE — PROGRESS NOTES
Behavioral Health Follow Up Note:      Weight Check:  Surgeon: Dr. Balbir Horta / Nita Lee - to see Ruddy Jacobs for date. Starting weight 244 #  Today's weight 246.5 #      Surgery month:  Jan /Feb  Surgery deadline: May    Mental health and wellness - Patient is appropriately making changes based off the information contained in the bariatric manual.  Patient is modifying behaviors and demonstrating adapting to change. Returned from vacation and food choices may not have been the best.  Reported strong support system.  watching his food intake too. Eating behaviors - mindful choices. Soups and salads. Tuna and crackers for lunch at work. Home cooked meal for dinner. Hydrating with water, no complaints. Has not found a protein shake yet. Does not like dairy. Does not drink milk. Reports eating slow and chewing food more. Activity -  walking on vacation. Bowling weekly. Walking after work. Walking at work . Track her steps with a smart device. 7K steps a day    Progress toward program requirements    Workflow:  Psych and/or D+A additional documentation  done  PCP Letter: done  Support Group: RECOMMENDED  Surgeon Appt.: done  EGD : done  Cardiac Risk Assessment: done  Sleep Studies:  done  Bloodwork: done       Goals:  find vitamins. Find more vegetables.

## 2023-11-09 ENCOUNTER — APPOINTMENT (OUTPATIENT)
Dept: LAB | Facility: CLINIC | Age: 53
End: 2023-11-09
Payer: COMMERCIAL

## 2023-11-09 ENCOUNTER — OFFICE VISIT (OUTPATIENT)
Dept: RHEUMATOLOGY | Facility: CLINIC | Age: 53
End: 2023-11-09

## 2023-11-09 VITALS
SYSTOLIC BLOOD PRESSURE: 120 MMHG | BODY MASS INDEX: 42.75 KG/M2 | DIASTOLIC BLOOD PRESSURE: 76 MMHG | WEIGHT: 250.4 LBS | HEIGHT: 64 IN

## 2023-11-09 DIAGNOSIS — M19.90 OSTEOARTHRITIS, UNSPECIFIED OSTEOARTHRITIS TYPE, UNSPECIFIED SITE: Primary | ICD-10-CM

## 2023-11-09 DIAGNOSIS — M19.90 ARTHRITIS: ICD-10-CM

## 2023-11-09 LAB
CRP SERPL QL: 7.4 MG/L
ERYTHROCYTE [SEDIMENTATION RATE] IN BLOOD: 26 MM/HOUR (ref 0–29)

## 2023-11-09 PROCEDURE — 86140 C-REACTIVE PROTEIN: CPT | Performed by: INTERNAL MEDICINE

## 2023-11-09 PROCEDURE — 85652 RBC SED RATE AUTOMATED: CPT | Performed by: INTERNAL MEDICINE

## 2023-11-09 PROCEDURE — 36415 COLL VENOUS BLD VENIPUNCTURE: CPT | Performed by: INTERNAL MEDICINE

## 2023-11-09 RX ORDER — DICLOFENAC SODIUM 75 MG/1
75 TABLET, DELAYED RELEASE ORAL 2 TIMES DAILY
Qty: 60 TABLET | Refills: 6 | Status: SHIPPED | OUTPATIENT
Start: 2023-11-09

## 2023-11-09 NOTE — ASSESSMENT & PLAN NOTE
Discussed and reviewed previous x-ray results with the patient and advised that it appears to be osteoarthritis. Educated regarding types of arthritis, autoimmune disease, and the difference between inflammatory arthritis and non-inflammatory arthritis. Will refill and prescribe diclofenac (Voltaren) 75 mg and advised to take twice a day as needed with food since it can cause GI upset. Discussed that NSAIDs medications can be taken for osteoarthritis. Will repeat inflammatory markers tests, Sed rate, and will place an order for x-rays of left foot and knee. Recommended for an injection and physical therapy if she is interested. Will place a note for PCP that they can continue medication refill of diclofenac (Voltaren). Advised that she can take Tylenol and indicated in not taking NSAIDs along with diclofenac (Voltaren). Discussed that I regularly prescribe Celebrex to take as needed to patients taking blood thinner medication, with GI problems, and had gastric bypass. Will place a note for the reference of her PCP.

## 2023-11-09 NOTE — PATIENT INSTRUCTIONS
Do labs  Do knee x-rays and left foot x-rays  Can take diclofenac twice a day as needed for joint pain, take with food; PCP can continue to refill as needed  PCP can consider celecoxib as needed for arthritis symptoms after bariatric surgery, since it is a more selective NSAID and less likely to affect the GI tract    Return to clinic as needed

## 2023-11-09 NOTE — PROGRESS NOTES
Assessment and Plan:  Kevin Cespedes is a 48 y.o.  female who presents as a Rheumatology consult referred by Self, Referral  for evaluation of joint pain, especially in knees and left foot, which seems to be due to osteoarthritis. Ordered inflammatory markers to rule-out inflammatory component, but elevated numbers are non-specific. Has had knee and ankle pain for many years, worse in the morning and at the end of the day, worse with rest. Top of feet pain started in the past year. Oral diclofenac helped, but didn't get refills by PCP. Do labs  Do knee x-rays and left foot x-rays  Can take diclofenac twice a day as needed for joint pain, take with food; PCP can continue to refill as needed  PCP can consider celecoxib as needed for arthritis symptoms after bariatric surgery, since it is a more selective NSAID and less likely to affect the GI tract    Return to clinic as needed    Plan:  1. Osteoarthritis, unspecified osteoarthritis type, unspecified site  Assessment & Plan:  Discussed and reviewed previous x-ray results with the patient and advised that it appears to be osteoarthritis. Educated regarding types of arthritis, autoimmune disease, and the difference between inflammatory arthritis and non-inflammatory arthritis. Will refill and prescribe diclofenac (Voltaren) 75 mg and advised to take twice a day as needed with food since it can cause GI upset. Discussed that NSAIDs medications can be taken for osteoarthritis. Will repeat inflammatory markers tests, Sed rate, and will place an order for x-rays of left foot and knee. Recommended for an injection and physical therapy if she is interested. Will place a note for PCP that they can continue medication refill of diclofenac (Voltaren). Advised that she can take Tylenol and indicated in not taking NSAIDs along with diclofenac (Voltaren).   Discussed that I regularly prescribe Celebrex to take as needed to patients taking blood thinner medication, with GI problems, and had gastric bypass. Will place a note for the reference of her PCP. Orders:  -     Sedimentation rate, automated  -     C-reactive protein  -     XR foot 3+ vw left; Future  -     XR knee 3 vw right non injury; Future  -     XR knee 3 vw left non injury; Future  -     diclofenac (VOLTAREN) 75 mg EC tablet; Take 1 tablet (75 mg total) by mouth 2 (two) times a day    2. Arthritis  -     Sedimentation rate, automated  -     C-reactive protein  -     XR foot 3+ vw left; Future  -     XR knee 3 vw right non injury; Future  -     XR knee 3 vw left non injury; Future  -     diclofenac (VOLTAREN) 75 mg EC tablet; Take 1 tablet (75 mg total) by mouth 2 (two) times a day    Return to clinic as needed      Chief Complaint  Evaluation of joint pain. MAUREEN Ann is a 48 y.o.  female who presents as a Rheumatology consult referred by Self, Referral.     The patient is here for evaluation of joint pain. She consents to the use of [facility] services today. She reports knee and ankle joint pain that has been going on for a couple of years and currently reports pain on the dorsum of her feet since approximately last year, 2022 which has been worse in the morning and afternoon. She also notes difficulty in ambulation and improved pain when in activity. She denies rashes, hot, red, or swollen joints and denies joint pain and swelling in her hands. She takes Advil or Aleve and notes that she had seen her primary physician, Dr. Shelly Sanchez in 2022 and was prescribed oral diclofenac (Voltaren) with benefit which she had taken once daily for the past year for approximately 3 weeks, but she cannot recall the dose she had. She confirms current pain in the right dorsum of her foot as the most bothering pain for her and has difficulty in foot flexion when ambulation especially on elevated areas. She had right foot x-ray in 09/2023 and had right ankle arthroscopy with short term benefit.  She believes her complaints have been progressively worse compared to her previous complaints a couple of years ago but notes she had a "sit-down" job before. She had a right knee Synvisc injection approximately 10 years ago with benefit by orthopedics and notes recent recurrence of right knee pain. She previously had physical therapy for her knees and had cortisone injection a couple of years ago and denies receiving current steroid injection. She had diclofenac (Voltaren) gel without benefit and denies taking naproxen. She notes occasional gastritis and acid reflux. She has sleeve gastrectomy scheduled in 12/2023. She has been taking vitamin D 50,000 international units once a week and she approximately has 5 more weeks to take prior to rechecking vitamin D level. She had symptoms of plantar fasciitis a couple of years ago. Denies photosensitivity, rashes, psoriasis, oral or nasal ulcers, alopecia, Raynaud's, h/o pericarditis or pleurisy, h/o blood clots or miscarriages. Admits mild dry eyes. No known family history of autoimmune or inflammatory diseases. Occupation: She works for 12 hours at [employer] that involves constant standing or walking throughout the [employer] facility. Social History. She quit smoking in 2010 and occasionally consumes alcohol. Family History. Her paternal grandfather had gout. Review of Systems  Pertinent ROS positive for dry eyes, indigestion, joint pain, neck pain, dizziness, and all finger numbness. Rest of 14 point ROS reviewed and were negative.     Allergies  No Known Allergies    Home Medications    Current Outpatient Medications:     diclofenac (VOLTAREN) 75 mg EC tablet, Take 1 tablet (75 mg total) by mouth 2 (two) times a day, Disp: 60 tablet, Rfl: 6    ergocalciferol (ERGOCALCIFEROL) 1.25 MG (19449 UT) capsule, Take 1 capsule (50,000 Units total) by mouth once a week for 12 doses, Disp: 12 capsule, Rfl: 0    ketoconazole (NIZORAL) 2 % shampoo, Shampoo twice a week for 8 weeks, then as needed. Leave on for 5 minutes before rinsing.   Rx per Derm., Disp: 120 mL, Rfl: 0    Past Medical History  Past Medical History:   Diagnosis Date    Arthritis     Class 2 severe obesity with serious comorbidity and body mass index (BMI) of 39.0 to 39.9 in adult      COVID-19 2020    Diabetes mellitus ([address] [facility])     gestational    Diverticulosis 2022    Generalized anxiety disorder 2015    GERD (gastroesophageal reflux disease)     History of gestational diabetes ,     IFG (impaired fasting glucose)     Obesity     Osteoarthritis     Osteoarthritis (Ankles & Knees)    Other hyperlipidemia 2021    Seborrheic dermatitis 2023    Vitamin D deficiency 2019       Past Surgical History   Past Surgical History:   Procedure Laterality Date    ARTHROSCOPY ANKLE Right     OA     SECTION   &     CHOLECYSTECTOMY  2021    COLONOSCOPY      EGD  2023    Gastritis    IN LAPAROSCOPY SURG CHOLECYSTECTOMY N/A 2021    Procedure: LAPAROSCOPIC POSSIBLE OPEN CHOLECYSTECTOMY;  Surgeon: Nakia Beaver MD;  Location:  MAIN OR;  Service: General    TONSILLECTOMY         Family History    Family History   Problem Relation Age of Onset    Diabetes Father 36        Type Unknown    Heart attack Father 32    Coronary artery disease Father 32    Heart disease Father     Heart defect Mother         R to L Shunt    Muscular dystrophy Mother 32    Heart disease Mother     No Known Problems Brother     No Known Problems Son     Diabetes Maternal Grandmother     Kidney disease Maternal Grandmother     No Known Problems Maternal Grandfather     Diabetes Paternal Grandmother     Heart disease Paternal Grandmother     Diabetes Paternal Grandfather     Heart disease Paternal Grandfather     No Known Problems Son        Social History  Social History     Substance and Sexual Activity   Alcohol Use Yes    Alcohol/week: 1.0 standard drink of alcohol    Types: 1 Glasses of wine per week     Social History     Substance and Sexual Activity   Drug Use Not Currently    Types: Marijuana    Comment: Past use of Marijuana     Social History     Tobacco Use   Smoking Status Former    Packs/day: 0.50    Years: 20.00    Total pack years: 10.00    Types: Cigarettes    Start date: 1986    Quit date: 2010    Years since quittin.8   Smokeless Tobacco Never       Objective:  Vitals:    23 0901   BP: 120/76   Weight: 114 kg (250 lb 6.4 oz)   Height: 5' 4" (1.626 m)       Physical Exam:  Constitutional:    General: She is not in acute distress. HENT:   Head: Normocephalic and atraumatic. Eyes:   Conjunctiva/sclera: Conjunctivae normal.   Cardiovascular:   Rate and Rhythm: Normal rate and regular rhythm. Heart sounds: S1 normal and S2 normal.   No friction rub. Pulmonary:   Effort: Pulmonary effort is normal. No respiratory distress. Breath sounds: Normal breath sounds. No wheezing, rhonchi, or rales. Musculoskeletal:   General: Mild ankle stiffness. Mild tenderness at dorsum of left foot. Cervical back: Neck supple. Skin:  General: Skin is warm and dry. Coloration: Skin is not pale. Findings: No rash. Neurological:   Mental Status: She is alert. Mental status is at baseline. Psychiatric:      Mood and Affect: Mood normal.      Behavior: Behavior normal.      I have personally reviewed results with the patient. She had normal blood work results for rheumatoid arthritis and lupus a couple of years ago. CRP was 6.5 mg/L a couple of years ago. Imaging:   Echo complete w/ contrast if indicated    Result Date: 10/18/2023  Narrative:   Left Ventricle: Left ventricular cavity size is normal. Wall thickness is normal. The left ventricular ejection fraction is 60%. Systolic function is normal. Wall motion is normal. Diastolic function is normal.   Aortic Valve: There is mild regurgitation. Tricuspid Valve: There is mild regurgitation.      Stress strip    Result Date: 10/12/2023  Narrative: Confirmed by Renato Patel ([id number]),  Chai Reyna ([id number]) on 10/12/2023 1:07:03 PM    NM myocardial perfusion spect (rx stress and/or rest)    Result Date: 10/12/2023  Narrative:   Stress ECG: The ECG was not diagnostic due to pharmacological (vasodilator) stress. Perfusion: There are no perfusion defects. Stress Function: Left ventricular function post-stress is normal. Stress ejection fraction is 79 %.  Normal pharmacologic nuclear stress test.        Labs:   Office Visit on 11/09/2023   Component Date Value Ref Range Status    Sed Rate 11/09/2023 26  0 - 29 mm/hour Final    CRP 11/09/2023 7.4 (H)  <3.0 mg/L Final   Hospital Outpatient Visit on 10/18/2023   Component Date Value Ref Range Status    Triscuspid Valve Regurgitation Pea* 10/18/2023 22.0  mmHg Final    RAA A4C 10/18/2023 14.6  cm2 Final    LA Volume Index (BP) 10/18/2023 34.9  mL/m2 Final    MV Peak A Marquise 10/18/2023 0.46  m/s Final    MV stenosis pressure 1/2 time 10/18/2023 60  ms Final    MV Peak E Marquise 10/18/2023 91  cm/s Final    E wave deceleration time 10/18/2023 208  ms Final    E/A ratio 10/18/2023 1.98   Final    MV valve area p 1/2 method 10/18/2023 3.67   Final    TR Peak Marquise 10/18/2023 2.3  m/s Final    RVID d 10/18/2023 3.0  cm Final    A4C EF 10/18/2023 78  % Final    Tricuspid valve peak regurgitation* 10/18/2023 2.34  m/s Final    Left ventricular stroke volume (2D) 10/18/2023 66.00  mL Final    IVSd 10/18/2023 1.30  cm Final    Tricuspid annular plane systolic e* 72/49/9548 3.25  cm Final    Ao root 10/18/2023 3.10  cm Final    LVPWd 10/18/2023 1.00  cm Final    LA size 10/18/2023 3.8  cm Final    Asc Ao 10/18/2023 3.1  cm Final    LA volume (BP) 10/18/2023 75  mL Final    FS 10/18/2023 39  28 - 44 Final    LVIDS 10/18/2023 2.80  cm Final    IVS 10/18/2023 1.3  cm Final    LVIDd 10/18/2023 4.60  cm Final    LA length (A2C) 10/18/2023 5.80  cm Final    LEFT VENTRICLE SYSTOLIC VOLUME (MO* 10/18/2023 31  mL Final    LV DIASTOLIC VOLUME (MOD BIPLANE) * 10/18/2023 97  mL Final    Left Atrium Area-systolic Four Aleah* 72/04/1518 21.2  cm2 Final    Left Atrium Area-systolic Apical T* 12/72/7458 24.6  cm2 Final    MV E' Tissue Velocity Septal 10/18/2023 10  cm/s Final    LVSV, 2D 10/18/2023 66  mL Final    LV EF 10/18/2023 60   Final   Hospital Outpatient Visit on 10/12/2023   Component Date Value Ref Range Status    Protocol Name 10/12/2023 Gilda Amis   Final    Time In Exercise Phase 10/12/2023 00:03:00   Final    MAX.  SYSTOLIC BP 16/67/9002 581  mmHg Final    Max Diastolic Bp 12/32/0183 70  mmHg Final    Max Heart Rate 10/12/2023 126  BPM Final    Max Predicted Heart Rate 10/12/2023 169  BPM Final    Reason for Termination 10/12/2023 Protocol Complete   Final    Test Indication 10/12/2023 Preop eval, family hx CAD, hyperlipidemia   Final    Target Hr Formular 10/12/2023 (220 - Age)*100%   Final    Chest Pain Statement 10/12/2023 none   Final   Hospital Outpatient Visit on 10/12/2023   Component Date Value Ref Range Status    Rest Nuclear Isotope Dose 10/12/2023 15.00  mCi Final    Stress Nuclear Isotope Dose 10/12/2023 45.90  mCi Final    EF (%) 10/12/2023 79  % Final    Baseline HR 10/12/2023 75  bpm Final    Baseline BP 10/12/2023 128/60  mmHg Final    O2 sat rest 10/12/2023 99  % Final    Stress peak HR 10/12/2023 126  bpm Final    Post peak BP 10/12/2023 144  mmHg Final    O2 sat peak 10/12/2023 99  % Final    Recovery HR 10/12/2023 86  bpm Final    Recovery BP 10/12/2023 136/74  mmHg Final    O2 sat recovery 10/12/2023 99  % Final    Max HR 10/12/2023 126  bpm Final    Max HR Percent 10/12/2023 74  % Final    Estimated workload 10/12/2023 1.5  METS Final    Rate Pressure Product 10/12/2023 18,144.0   Final    Angina Index 10/12/2023 0   Final    Stress/rest perfusion ratio 10/12/2023 1.07   Final   Appointment on 10/06/2023   Component Date Value Ref Range Status    Glucose, Random 10/06/2023 96 70 - 99 mg/dL Final    Cholesterol, Total 10/06/2023 223 (H)  100 - 199 mg/dL Final    BILIRUBIN, TOTAL 10/06/2023 <0.1  0.0 - 1.2 mg/dL Final    AST (SGOT) P5P 10/06/2023 17  0 - 40 IU/L Final    Triglycerides 10/06/2023 120  0 - 149 mg/dL Final    ALT (SGPT) 10/06/2023 21  0 - 40 IU/L Final    Haptoglobin 10/06/2023 122  33 - 346 mg/dL Final    GGT 10/06/2023 11  0 - 60 IU/L Final    Apolipoprotein A-1 10/06/2023 157  116 - 209 mg/dL Final    Alpha 2-Macroglobulins, Qn 10/06/2023 165  110 - 276 mg/dL Final    Fibrosis Scoring: 10/06/2023 Comment   Final         <=0.21 = Stage F0 - No fibrosis  0.21 - 0.27 = Stage F0 - F1  0.27 - 0.31 = Stage F1 - Portal fibrosis  0.31 - 0.48 = Stage F1 - F2  0.48 - 0.58 = Stage F2 - Bridging fibrosis with few septa  0.58 - 0.72 = Stage F3 - Bridging fibrosis with many septa  0.72 - 0.74 = Stage F3 - F4        >0.74 = Stage F4 - Cirrhosis    BHATTI Scoring 10/06/2023 Comment   Final         <=0.25 = N0 - No BHATTI  0.25 - 0.50 = N1 - Mild BHATTI  0.50 - 0.75 = N2 - Moderate BHATTI        >0.75 = N3 - Severe BHATTI    Fibrosis Score 10/06/2023 0.03  0.00 - 0.21 Final    Fibrosis Stage 10/06/2023 Comment   Final                       F0 - No fibrosis    Steatosis Score 10/06/2023 0.35  0.00 - 0.40 Final    Steatosis Grade 10/06/2023 Comment   Final                       S0 - No Steatosis (<5%)    Comment: 10/06/2023 Comment   Final    This test was developed and its performance characteristics  determined by [address] [place] [address]. It has not been cleared or approved  by the Food and Drug Administration. For questions regarding this report please contact customer service  at [phone]. References:  1.  Laila PANCHAL et al. Diagnostic Value of Biochemical Markers  (FibroTest) for the prediction of Liver Fibrosis in patients with  Non-Alcoholic Fatty Liver Disease.  [facility] 2006; 6:6.  2.  [place] BARKER. et al. The Diagnostic Performance of a Simplified  Blood Test (SteatoTest-2) for the Prediction of Liver Steatosis. Eur J Gastroenterol Hepatol. 2019; 79:578-872. 3.  [place] GRESHAM et al. Diagnostic performance of a new noninvasive  test for nonalcoholic steatohepatitis using a simplified histological  reference. Eur J Gastroenterol Hepatol. 2018 May; 30:569-577. BHATTI SCORE 10/06/2023 0.00  0.00 - 0.25 Final    BHATTI GRADE 10/06/2023 Comment   Final                       N0 - No BHATTI    METHODOLOGY 10/06/2023 Comment   Final    The analytes tested are performed by FibroSure-Specific methods. Not intended for use with other diagnostic considerations. INTERPRETATION 10/06/2023 Comment   Final    Comment: Quantitative results of 10 biochemicals in combination with age and  gender, are analyzed using a computational algorithm to provide a  quantitative surrogate marker (0.0-1.0) of liver fibrosis (Metavir  F0-F4), hepatic steatosis (0.0-1.0, S0-S3), and Non-Alcoholic  Steato-Hepatitis (BHATTI) (0.0-1.0, N0-N3). The absence of steatosis  (S<0.40) precludes the diagnosis of BHATTI. Fibrosis marker: In a study of 804 Non-Alcoholic Fatty Liver Disease  (NAFLD) patients where 23% had significant NAFLD fibrosis (Metavir  F2-F4) and 11% had cirrhosis by liver biopsy, a fibrosis result of  >0.3 yielded a sensitivity of 83% and a specificity of 78% for the  detection of significant fibrosis. [1]  Steatosis marker: In a population of 2997 patients, where 61% had  significant steatosis (>=5%) on a liver biopsy, a steatosis score  >0.4 had a sensitivity of 79% and a specificity of 50% for  identification of significant steatosis. [2]  BHATTI marker: In a population of 1081 NAFLD patients, where 51% had  at                            least some BHATTI by liver biopsy, a prediction of BHATTI had a  sensitivity of 72% for identifying BHATTI and a specificity of 71%. [3]    STEATOSIS SCORING 10/06/2023 Comment   Final         <=0.40 = S0  - No Steatosis (<5%)  0.40 - 0.55 = S1  - Mild Steatosis                      (but Clinically Significant) (5-33%)        >0.55 = S2S3- Moderate to Severe Steatosis                      (Clinically Significant) (%)    LIMITATIONS: 10/06/2023 Comment   Final    BHATTI FibroSure(R) Plus is recommended for patients with suspected  non-alcoholic fatty liver disease. It is not recommended for  patients with other liver diseases. It is also not recommended  in patients with [facility] Disease, acute hemolysis, acute viral  hepatitis, drug induced hepatitis, genetic liver disease,  autoimmune hepatitis and/or extra-hepatic cholestasis. Any of  these clinical situations may lead to inaccurate quantitative  predictions of fibrosis. Hospital Outpatient Visit on 09/06/2023   Component Date Value Ref Range Status    EXT Preg Test, Ur 09/06/2023 Negative  Negative Final    Control 09/06/2023 Valid  Valid Final    Case Report 09/06/2023    Final                    Value:Surgical Pathology Report                         Case: [id number]                                   Authorizing Provider:  Dakota Lopez MD        Collected:           09/06/2023 1046              Ordering Location:     [address] [place] [address]        Received:            09/06/2023 [address] [facility]                                                          Pathologist:           Elise Cristina MD                                                             Specimen:    Stomach, r/o h pylori                                                                      Final Diagnosis 09/06/2023    Final                    Value: This result contains rich text formatting which cannot be displayed here. Additional Information 09/06/2023    Final                    Value: This result contains rich text formatting which cannot be displayed here. Gross Description 09/06/2023    Final                    Value: This result contains rich text formatting which cannot be displayed here.    Appointment on 09/02/2023   Component Date Value Ref Range Status    Hep B Core Total Ab 09/02/2023 Non-reactive  Non-Reactive Final    Hep B S Ab 09/02/2023 <3.00  3-500 mIU/mL mIU/mL Final    Protective Immunity: Hep B Surface Antibody >= 10 mIu/ml (Traceable to [facility] International Reference Preparation)    Hepatitis B Surface Ag 09/02/2023 Non-reactive  Non-Reactive Final    Hep A Total Ab 09/02/2023 Reactive (A)  Non-Reactive Final   Lab on 08/30/2023   Component Date Value Ref Range Status    WBC 08/30/2023 7.10  4.31 - 10.16 Thousand/uL Final    RBC 08/30/2023 4.09  3.81 - 5.12 Million/uL Final    Hemoglobin 08/30/2023 12.2  11.5 - 15.4 g/dL Final    Hematocrit 08/30/2023 37.5  34.8 - 46.1 % Final    MCV 08/30/2023 92  82 - 98 fL Final    MCH 08/30/2023 29.8  26.8 - 34.3 pg Final    MCHC 08/30/2023 32.5  31.4 - 37.4 g/dL Final    RDW 08/30/2023 13.9  11.6 - 15.1 % Final    Platelets 09/86/8431 301  149 - 390 Thousands/uL Final    MPV 08/30/2023 9.9  8.9 - 12.7 fL Final    Sodium 08/30/2023 138  135 - 147 mmol/L Final    Potassium 08/30/2023 4.1  3.5 - 5.3 mmol/L Final    Chloride 08/30/2023 105  96 - 108 mmol/L Final    CO2 08/30/2023 25  21 - 32 mmol/L Final    ANION GAP 08/30/2023 8  mmol/L Final    BUN 08/30/2023 9  5 - 25 mg/dL Final    Creatinine 08/30/2023 0.71  0.60 - 1.30 mg/dL Final    Standardized to IDMS reference method    Glucose, Fasting 08/30/2023 90  65 - 99 mg/dL Final    Calcium 08/30/2023 9.6  8.4 - 10.2 mg/dL Final    AST 08/30/2023 16  13 - 39 U/L Final    ALT 08/30/2023 15  7 - 52 U/L Final    Specimen collection should occur prior to Sulfasalazine administration due to the potential for falsely depressed results.      Alkaline Phosphatase 08/30/2023 84  34 - 104 U/L Final    Total Protein 08/30/2023 7.0  6.4 - 8.4 g/dL Final    Albumin 08/30/2023 3.9  3.5 - 5.0 g/dL Final    Total Bilirubin 08/30/2023 0.51  0.20 - 1.00 mg/dL Final    Use of this assay is not recommended for patients undergoing treatment with eltrombopag due to the potential for falsely elevated results. N-acetyl-p-benzoquinone imine (metabolite of Acetaminophen) will generate erroneously low results in samples for patients that have taken an overdose of Acetaminophen. eGFR 08/30/2023 99  ml/min/1.73sq m Final    Cholesterol 08/30/2023 180  See Comment mg/dL Final    Cholesterol:         Pediatric <18 Years        Desirable          <170 mg/dL      Borderline High    170-199 mg/dL      High               >=200 mg/dL        Adult >=18 Years            Desirable         <200 mg/dL      Borderline High   200-239 mg/dL      High              >239 mg/dL      Triglycerides 08/30/2023 71  See Comment mg/dL Final    Triglyceride:     0-9Y            <75mg/dL     10Y-17Y         <90 mg/dL       >=18Y     Normal          <150 mg/dL     Borderline High 150-199 mg/dL     High            200-499 mg/dL        Very High       >499 mg/dL    Specimen collection should occur prior to Metamizole administration due to the potential for falsely depressed results. HDL, Direct 08/30/2023 57  >=50 mg/dL Final    LDL Calculated 08/30/2023 109 (H)  0 - 100 mg/dL Final    LDL Cholesterol:     Optimal           <100 mg/dl     Near Optimal      100-129 mg/dl     Above Optimal       Borderline High 130-159 mg/dl       High            160-189 mg/dl       Very High       >189 mg/dl         This screening LDL is a calculated result. It does not have the accuracy of the Direct Measured LDL in the monitoring of patients with hyperlipidemia and/or statin therapy. Direct Measure LDL (QCT684) must be ordered separately in these patients.     Non-HDL-Chol (CHOL-HDL) 08/30/2023 123  mg/dl Final    TSH 3RD GENERATON 08/30/2023 1.607  0.450 - 4.500 uIU/mL Final    The recommended reference ranges for TSH during pregnancy are as follows:   First trimester 0.1 to 2.5 uIU/mL   Second trimester  0.2 to 3.0 uIU/mL   Third trimester 0.3 to 3.0 uIU/m    Note: Normal ranges may not apply to patients who are transgender, non-binary, or whose legal sex, sex at birth, and gender identity differ. Adult TSH (3rd generation) reference range follows the recommended guidelines of the American Thyroid Association, January, 2020. Hemoglobin A1C 08/30/2023 5.7 (H)  Normal 4.0-5.6%; PreDiabetic 5.7-6.4%; Diabetic >=6.5%; Glycemic control for adults with diabetes <7.0% % Final    EAG 08/30/2023 117  mg/dl Final    Vit D, 25-Hydroxy 08/30/2023 15.3 (L)  30.0 - 100.0 ng/mL Final    Vitamin D guidelines established by Clinical Guidelines Subcommittee  of the Endocrine Society Task Force, 2011    Deficiency <20ng/ml   Insufficiency 20-30ng/ml   Sufficient  ng/ml     LDL Direct 08/30/2023 119 (H)  0 - 100 mg/dl Final         Transcribed for Marielos Arnold MD, by Brook Machuca on 11/16/23 at 1:06 PM. Powered by [employer].

## 2023-11-15 ENCOUNTER — HOSPITAL ENCOUNTER (OUTPATIENT)
Dept: ULTRASOUND IMAGING | Facility: HOSPITAL | Age: 53
Discharge: HOME | End: 2023-11-15
Payer: COMMERCIAL

## 2023-11-15 DIAGNOSIS — Z01.818 PRE-OPERATIVE EXAM: ICD-10-CM

## 2023-11-15 DIAGNOSIS — K76.0 NAFLD (NONALCOHOLIC FATTY LIVER DISEASE): ICD-10-CM

## 2023-11-15 PROCEDURE — 76981 USE PARENCHYMA: CPT

## 2023-11-16 ENCOUNTER — OFFICE VISIT (OUTPATIENT)
Dept: BARIATRICS | Facility: CLINIC | Age: 53
End: 2023-11-16

## 2023-11-16 DIAGNOSIS — E66.01 CLASS 3 SEVERE OBESITY WITH SERIOUS COMORBIDITY AND BODY MASS INDEX (BMI) OF 40.0 TO 44.9 IN ADULT, UNSPECIFIED OBESITY TYPE (HCC): Primary | ICD-10-CM

## 2023-11-16 PROCEDURE — RECHECK: Performed by: DIETITIAN, REGISTERED

## 2023-11-16 NOTE — PROGRESS NOTES
Pt. educated on the pre operative liver shrinking diet. Pt understands that the diet needs to be followed for 2 weeks prior to surgery. Handout reviewed. Emphasized the need to drink 80 ounces of fluid per day while on the diet. Patient will return for a pre op weight check to ensure she has met her goal. Contact information for any questions/concerns    Also emailed detailed pre-op class liver shrinking diet handouts and vitamin and calcium comparison chart.

## 2023-11-17 RX ORDER — MULTIVITAMIN
1 CAPSULE ORAL DAILY
COMMUNITY

## 2023-11-17 NOTE — PRE-PROCEDURE INSTRUCTIONS
Pre-Surgery Instructions:   Medication Instructions    diclofenac (VOLTAREN) 75 mg EC tablet Stop taking 7 days prior to surgery. ergocalciferol (ERGOCALCIFEROL) 1.25 MG (78104 UT) capsule Takes once per week on saturday    ketoconazole (NIZORAL) 2 % shampoo Take night before surgery    Multiple Vitamin (multivitamin) capsule Stop taking 7 days prior to surgery. Vadim-class 11/30/23- Pt instructed to follow pre-surgical drink instructions given by surgeon's office. Pt verbalized an understanding. Medication instructions for day surgery reviewed. Please use only a sip of water to take your instructed medications. Avoid all over the counter vitamins, supplements and NSAIDS for one week prior to surgery per anesthesia guidelines. Tylenol is ok to take as needed. You will receive a call one business day prior to surgery with an arrival time and hospital directions. If your surgery is scheduled on a Monday, the hospital will be calling you on the Friday prior to your surgery. If you have not heard from anyone by 8pm, please call the hospital supervisor through the hospital  at [phone]. Keyana Tian [phone]). Do not eat or drink anything after midnight the night before your surgery, including candy, mints, lifesavers, or chewing gum. Do not drink alcohol 24hrs before your surgery. Try not to smoke at least 24hrs before your surgery. Follow the pre surgery showering instructions as listed in the [facility] Surgical Experience Booklet” or otherwise provided by your surgeon's office. Do not use a blade to shave the surgical area 1 week before surgery. It is okay to use a clean electric clippers up to 24 hours before surgery. Do not apply any lotions, creams, including makeup, cologne, deodorant, or perfumes after showering on the day of your surgery. Do not use dry shampoo, hair spray, hair gel, or any type of hair products. No contact lenses, eye make-up, or artificial eyelashes.  Remove nail polish, including gel polish, and any artificial, gel, or acrylic nails if possible. Remove all jewelry including rings and body piercing jewelry. Wear causal clothing that is easy to take on and off. Consider your type of surgery. Keep any valuables, jewelry, piercings at home. Please bring any specially ordered equipment (sling, braces) if indicated. Arrange for a responsible person to drive you to and from the hospital on the day of your surgery. Visitor Guidelines discussed. Call the surgeon's office with any new illnesses, exposures, or additional questions prior to surgery. Please reference your [facility] Surgical Experience Booklet” for additional information to prepare for your upcoming surgery.

## 2023-11-30 ENCOUNTER — OFFICE VISIT (OUTPATIENT)
Dept: BARIATRICS | Facility: CLINIC | Age: 53
End: 2023-11-30
Payer: COMMERCIAL

## 2023-11-30 ENCOUNTER — CLINICAL SUPPORT (OUTPATIENT)
Dept: BARIATRICS | Facility: CLINIC | Age: 53
End: 2023-11-30

## 2023-11-30 VITALS
BODY MASS INDEX: 41.4 KG/M2 | HEART RATE: 58 BPM | WEIGHT: 248.5 LBS | HEIGHT: 65 IN | DIASTOLIC BLOOD PRESSURE: 72 MMHG | SYSTOLIC BLOOD PRESSURE: 118 MMHG | TEMPERATURE: 97.8 F

## 2023-11-30 DIAGNOSIS — F41.1 GENERALIZED ANXIETY DISORDER: ICD-10-CM

## 2023-11-30 DIAGNOSIS — M15.9 OSTEOARTHRITIS OF MULTIPLE JOINTS, UNSPECIFIED OSTEOARTHRITIS TYPE: ICD-10-CM

## 2023-11-30 DIAGNOSIS — E66.01 CLASS 3 SEVERE OBESITY WITH SERIOUS COMORBIDITY AND BODY MASS INDEX (BMI) OF 40.0 TO 44.9 IN ADULT, UNSPECIFIED OBESITY TYPE (HCC): Primary | ICD-10-CM

## 2023-11-30 DIAGNOSIS — R73.01 IFG (IMPAIRED FASTING GLUCOSE): ICD-10-CM

## 2023-11-30 DIAGNOSIS — E66.01 OBESITY, CLASS III, BMI 40-49.9 (MORBID OBESITY) (HCC): Primary | ICD-10-CM

## 2023-11-30 DIAGNOSIS — E78.49 OTHER HYPERLIPIDEMIA: ICD-10-CM

## 2023-11-30 PROCEDURE — 99213 OFFICE O/P EST LOW 20 MIN: CPT | Performed by: SURGERY

## 2023-11-30 PROCEDURE — RECHECK: Performed by: DIETITIAN, REGISTERED

## 2023-11-30 RX ORDER — ENOXAPARIN SODIUM 100 MG/ML
40 INJECTION SUBCUTANEOUS ONCE
OUTPATIENT
Start: 2023-12-12 | End: 2023-11-30

## 2023-11-30 RX ORDER — CELECOXIB 200 MG/1
200 CAPSULE ORAL ONCE
OUTPATIENT
Start: 2023-12-12 | End: 2023-11-30

## 2023-11-30 RX ORDER — ACETAMINOPHEN 10 MG/ML
1000 INJECTION, SOLUTION INTRAVENOUS
OUTPATIENT
Start: 2023-12-12 | End: 2023-12-13

## 2023-11-30 RX ORDER — HEPARIN SODIUM 5000 [USP'U]/ML
5000 INJECTION, SOLUTION INTRAVENOUS; SUBCUTANEOUS ONCE
Status: CANCELLED | OUTPATIENT
Start: 2023-11-30 | End: 2023-11-30

## 2023-11-30 RX ORDER — CEFAZOLIN SODIUM 2 G/50ML
2000 SOLUTION INTRAVENOUS ONCE
OUTPATIENT
Start: 2023-12-12 | End: 2023-11-30

## 2023-11-30 NOTE — PROGRESS NOTES
BARIATRIC H&P - BARIATRIC SURGERY  Maycol Mckoy 48 y.o. female MRN: [id number]  Unit/Bed#:  Encounter: [id number]      HPI:  Maycol Mckoy is a 48 y.o. female who presents with a long-standing history of morbid obesity. She was found to be a good candidate to undergo a bariatric operation upon [facility]. She is here today to discuss details of her surgery. Review of Systems   All other systems reviewed and are negative.       Historical Information   Past Medical History:   Diagnosis Date    Arthritis     Class 2 severe obesity with serious comorbidity and body mass index (BMI) of 39.0 to 39.9 in adult      COVID-19 2020    Diabetes mellitus ([address])     gestational    Diverticulosis 2022    Generalized anxiety disorder 2015    GERD (gastroesophageal reflux disease)     History of gestational diabetes ,     IFG (impaired fasting glucose)     Obesity     Osteoarthritis     Osteoarthritis (Ankles & Knees)    Other hyperlipidemia 2021    Seborrheic dermatitis 2023    Vitamin D deficiency 2019     Past Surgical History:   Procedure Laterality Date    ARTHROSCOPY ANKLE Right 2016    OA     SECTION   &     CHOLECYSTECTOMY  2021    COLONOSCOPY      EGD  2023    Gastritis    [place] LAPAROSCOPY SURG CHOLECYSTECTOMY N/A 2021    Procedure: LAPAROSCOPIC POSSIBLE OPEN CHOLECYSTECTOMY;  Surgeon: Bg Carmona MD;  Location:  MAIN OR;  Service: General    TONSILLECTOMY       Social History   Social History     Substance and Sexual Activity   Alcohol Use Yes    Alcohol/week: 1.0 standard drink of alcohol    Types: 1 Glasses of wine per week     Social History     Substance and Sexual Activity   Drug Use Not Currently    Types: Marijuana    Comment: Past use of Marijuana     Social History     Tobacco Use   Smoking Status Former    Packs/day: 0.50    Years: 20.00    Total pack years: 10.00    Types: Cigarettes    Start date: 1986    Quit date: 2010    Years since quittin.9   Smokeless Tobacco Never     Family History: non-contributory    Meds/Allergies   all medications and allergies reviewed  No Known Allergies    Objective     Current Vitals:          Invasive Devices       None                   Physical Exam  Vitals and nursing note reviewed. Constitutional:       General: She is not in acute distress. Appearance: Normal appearance. She is well-developed. She is not diaphoretic. HENT:      Head: Normocephalic and atraumatic. Nose: Nose normal.   Eyes:      General: No scleral icterus. Right eye: No discharge. Left eye: No discharge. Conjunctiva/sclera: Conjunctivae normal.   Cardiovascular:      Rate and Rhythm: Normal rate and regular rhythm. Heart sounds: Normal heart sounds. Pulmonary:      Effort: Pulmonary effort is normal. No respiratory distress. Breath sounds: Normal breath sounds. No stridor. No wheezing or rales. Chest:      Chest wall: No tenderness. Abdominal:      General: Bowel sounds are normal.      Palpations: Abdomen is soft. Tenderness: There is no abdominal tenderness. There is no guarding or rebound. Comments: Abdomen is obese, soft and benign. Musculoskeletal:         General: No deformity. Normal range of motion. Cervical back: Normal range of motion and neck supple. Lymphadenopathy:      Cervical: No cervical adenopathy. Skin:     General: Skin is warm and dry. Findings: No erythema or rash. Neurological:      Mental Status: She is alert and oriented to person, place, and time. Psychiatric:         Behavior: Behavior normal.         Thought Content: Thought content normal.         Judgment: Judgment normal.         Lab Results: I have personally reviewed pertinent lab results. Imaging: I have personally reviewed pertinent reports.     EKG, Pathology, and Other Studies: I have personally reviewed pertinent reports. The endoscopy showed gastritis. The biopsies revealed  Final Diagnosis  A. Stomach, r/o h pylori:  - Gastric oxyntic and antral type mucosa with minimal or mild chronic inflammation  - No kailash shaped bacteria seen on H&E stained tissue section  - Negative for intestinal metaplasia and dysplasia           Assessment/PLAN:    48 y.o. female morbidly obese found to be a good candidate to undergo a weight loss operation upon being enrolled here at the [facility].    Patient has a long history of morbid obesity and is presenting to discuss the surgical weight loss options. Despite the patient best efforts patient was unable to lose any meaningful or sustainable weight using nonsurgical means. We had a long discussion regarding all the surgical weight-loss options at our disposal at this point and reviewed the risks and benefits of each procedure in details as it relates to her age, BMI and medical conditions. She has been pre certified to undergo a Laparoscopic sleeve gastrectomy    We have discussed the 14%-20% incidence of post op heartburn after the sleeve gastrectomy and the fact that if this cannot be controlled with medication or conservative measures it might need to be converted to a Salvador-en-Y gastric bypass. We have also discussed the fact that the patient needs to be followed up postoperatively and potentially get screening endoscopies in the future to rule out any development of pathology as a result of the sleeve gastrectomy. [facility] Here today to review her pre op test results. Has been medically cleared for the procedure. I have discussed with her at length the risks and benefits of the operation and reiterated the components of our multidisciplinary program and the importance of compliance and follow up in the post operative period.  Although there is a great statistical chance of improvement or even resolution of most of her associated comorbidities, the results vary from patient to patient and they largely depend on her commitment. The patient was also instructed with regards to the importance of behavior modification, nutritional counseling, support meeting attendance and lifestyle changes that are important to ensure success. She was given the opportunity to ask questions and I have answered all of them. I have addressed with the patient the level of CODE STATUS for this hospital stay and after explaining the different options currently she wishes to be a Level I. She understands and wishes to proceed.     She will continue with a liquid diet to lose more weight prior to the surgery    Gloria Lei MD  11/30/2023  1:12 PM

## 2023-11-30 NOTE — H&P (VIEW-ONLY)
BARIATRIC H&P - BARIATRIC SURGERY  Armando Melton 48 y.o. female MRN: [id number]  Unit/Bed#:  Encounter: [id number]      HPI:  Armando Melton is a 48 y.o. female who presents with a long-standing history of morbid obesity. She was found to be a good candidate to undergo a bariatric operation upon being [facility]. She is here today to discuss details of her surgery. Review of Systems   All other systems reviewed and are negative.       Historical Information   Past Medical History:   Diagnosis Date    Arthritis     Class 2 severe obesity with serious comorbidity and body mass index (BMI) of 39.0 to 39.9 in adult      COVID-19 2020    Diabetes mellitus ([address])     gestational    Diverticulosis 2022    Generalized anxiety disorder 2015    GERD (gastroesophageal reflux disease)     History of gestational diabetes ,     IFG (impaired fasting glucose)     Obesity     Osteoarthritis     Osteoarthritis (Ankles & Knees)    Other hyperlipidemia 2021    Seborrheic dermatitis 2023    Vitamin D deficiency 2019     Past Surgical History:   Procedure Laterality Date    ARTHROSCOPY ANKLE Right 2016    OA     SECTION   &     CHOLECYSTECTOMY  2021    COLONOSCOPY      EGD  2023    Gastritis    [place] LAPAROSCOPY SURG CHOLECYSTECTOMY N/A 2021    Procedure: LAPAROSCOPIC POSSIBLE OPEN CHOLECYSTECTOMY;  Surgeon: Claude Foley MD;  Location:  MAIN OR;  Service: General    TONSILLECTOMY       Social History   Social History     Substance and Sexual Activity   Alcohol Use Yes    Alcohol/week: 1.0 standard drink of alcohol    Types: 1 Glasses of wine per week     Social History     Substance and Sexual Activity   Drug Use Not Currently    Types: Marijuana    Comment: Past use of Marijuana     Social History     Tobacco Use   Smoking Status Former    Packs/day: 0.50    Years: 20.00    Total pack years: 10.00    Types: Cigarettes    Start date: 1986    Quit date: 2010    Years since quittin.9   Smokeless Tobacco Never     Family History: non-contributory    Meds/Allergies   all medications and allergies reviewed  No Known Allergies    Objective     Current Vitals:          Invasive Devices       None                   Physical Exam  Vitals and nursing note reviewed. Constitutional:       General: She is not in acute distress. Appearance: Normal appearance. She is well-developed. She is not diaphoretic. HENT:      Head: Normocephalic and atraumatic. Nose: Nose normal.   Eyes:      General: No scleral icterus. Right eye: No discharge. Left eye: No discharge. Conjunctiva/sclera: Conjunctivae normal.   Cardiovascular:      Rate and Rhythm: Normal rate and regular rhythm. Heart sounds: Normal heart sounds. Pulmonary:      Effort: Pulmonary effort is normal. No respiratory distress. Breath sounds: Normal breath sounds. No stridor. No wheezing or rales. Chest:      Chest wall: No tenderness. Abdominal:      General: Bowel sounds are normal.      Palpations: Abdomen is soft. Tenderness: There is no abdominal tenderness. There is no guarding or rebound. Comments: Abdomen is obese, soft and benign. Musculoskeletal:         General: No deformity. Normal range of motion. Cervical back: Normal range of motion and neck supple. Lymphadenopathy:      Cervical: No cervical adenopathy. Skin:     General: Skin is warm and dry. Findings: No erythema or rash. Neurological:      Mental Status: She is alert and oriented to person, place, and time. Psychiatric:         Behavior: Behavior normal.         Thought Content: Thought content normal.         Judgment: Judgment normal.         Lab Results: I have personally reviewed pertinent lab results. Imaging: I have personally reviewed pertinent reports.     EKG, Pathology, and Other Studies: I have personally reviewed pertinent reports. The endoscopy showed gastritis. The biopsies revealed  Final Diagnosis  A. Stomach, r/o h pylori:  - Gastric oxyntic and antral type mucosa with minimal or mild chronic inflammation  - No kailash shaped bacteria seen on H&E stained tissue section  - Negative for intestinal metaplasia and dysplasia           Assessment/PLAN:    48 y.o. female morbidly obese found to be a good candidate to undergo a weight loss operation upon being enrolled here at the [facility].    Patient has a long history of morbid obesity and is presenting to discuss the surgical weight loss options. Despite the patient best efforts patient was unable to lose any meaningful or sustainable weight using nonsurgical means. We had a long discussion regarding all the surgical weight-loss options at our disposal at this point and reviewed the risks and benefits of each procedure in details as it relates to her age, BMI and medical conditions. She has been pre certified to undergo a Laparoscopic sleeve gastrectomy    We have discussed the 14%-20% incidence of post op heartburn after the sleeve gastrectomy and the fact that if this cannot be controlled with medication or conservative measures it might need to be converted to a Salvador-en-Y gastric bypass. We have also discussed the fact that the patient needs to be followed up postoperatively and potentially get screening endoscopies in the future to rule out any development of pathology as a result of the sleeve gastrectomy. Lisa Cifuentes Here today to review her pre op test results. Has been medically cleared for the procedure. I have discussed with her at length the risks and benefits of the operation and reiterated the components of our multidisciplinary program and the importance of compliance and follow up in the post operative period.  Although there is a great statistical chance of improvement or even resolution of most of her associated comorbidities, the results vary from patient to patient and they largely depend on her commitment. The patient was also instructed with regards to the importance of behavior modification, nutritional counseling, support meeting attendance and lifestyle changes that are important to ensure success. She was given the opportunity to ask questions and I have answered all of them. I have addressed with the patient the level of CODE STATUS for this hospital stay and after explaining the different options currently she wishes to be a Level I. She understands and wishes to proceed.     She will continue with a liquid diet to lose more weight prior to the surgery    Vladimir Obando MD  11/30/2023  1:12 PM

## 2023-12-01 DIAGNOSIS — E66.01 OBESITY, CLASS III, BMI 40-49.9 (MORBID OBESITY) (HCC): Primary | ICD-10-CM

## 2023-12-01 NOTE — TELEPHONE ENCOUNTER
PO Meds for  Laparoscopic  Sleeve  gastrectomy & Intraoperative  EGD  Robotically  Assisted . With Dr. Everette Neely on 12/12/2023.

## 2023-12-04 RX ORDER — ENOXAPARIN SODIUM 100 MG/ML
40 INJECTION SUBCUTANEOUS
Qty: 5.2 ML | Refills: 0 | Status: SHIPPED | OUTPATIENT
Start: 2023-12-13 | End: 2023-12-26

## 2023-12-04 RX ORDER — OMEPRAZOLE 20 MG/1
20 CAPSULE, DELAYED RELEASE ORAL DAILY
Qty: 90 CAPSULE | Refills: 1 | Status: SHIPPED | OUTPATIENT
Start: 2023-12-04

## 2023-12-04 RX ORDER — OXYCODONE HYDROCHLORIDE 5 MG/1
5 TABLET ORAL EVERY 4 HOURS PRN
Qty: 10 TABLET | Refills: 0 | Status: SHIPPED | OUTPATIENT
Start: 2023-12-04

## 2023-12-07 ENCOUNTER — TELEPHONE (OUTPATIENT)
Dept: BARIATRICS | Facility: CLINIC | Age: 53
End: 2023-12-07

## 2023-12-07 NOTE — TELEPHONE ENCOUNTER
Patient called on liver shrinking diet (sx schedueld for 23). She is concerned about constipation. Intaking the followin fairlife shakes a day  80+ oz water daily, occasionally gatorade zero  No non-starchy vegetables  No bowel movement in Monday (3 days). Rec suppository or enema for good bowel movement. Than to start miralax as directed at pre-op class. Patient verbalized understanding.

## 2023-12-11 ENCOUNTER — ANESTHESIA EVENT (OUTPATIENT)
Dept: PERIOP | Facility: HOSPITAL | Age: 53
DRG: 621 | End: 2023-12-11
Payer: COMMERCIAL

## 2023-12-12 ENCOUNTER — ANESTHESIA (OUTPATIENT)
Dept: PERIOP | Facility: HOSPITAL | Age: 53
DRG: 621 | End: 2023-12-12
Payer: COMMERCIAL

## 2023-12-12 ENCOUNTER — HOSPITAL ENCOUNTER (INPATIENT)
Facility: HOSPITAL | Age: 53
LOS: 1 days | Discharge: HOME | DRG: 621 | End: 2023-12-13
Attending: SURGERY | Admitting: SURGERY
Payer: COMMERCIAL

## 2023-12-12 DIAGNOSIS — K21.9 GASTROESOPHAGEAL REFLUX DISEASE, UNSPECIFIED WHETHER ESOPHAGITIS PRESENT: ICD-10-CM

## 2023-12-12 DIAGNOSIS — E78.2 MIXED HYPERLIPIDEMIA: ICD-10-CM

## 2023-12-12 DIAGNOSIS — E66.01 MORBID OBESITY (HCC): Primary | ICD-10-CM

## 2023-12-12 LAB
EXT PREGNANCY TEST URINE: NEGATIVE
EXT PREGNANCY TEST URINE: NEGATIVE
EXT. CONTROL: NORMAL
EXT. CONTROL: NORMAL
GLUCOSE SERPL-MCNC: 95 MG/DL (ref 65–140)

## 2023-12-12 PROCEDURE — 82948 REAGENT STRIP/BLOOD GLUCOSE: CPT

## 2023-12-12 PROCEDURE — 43775 LAP SLEEVE GASTRECTOMY: CPT | Performed by: SURGERY

## 2023-12-12 PROCEDURE — C1781 MESH (IMPLANTABLE): HCPCS | Performed by: SURGERY

## 2023-12-12 PROCEDURE — S2900 ROBOTIC SURGICAL SYSTEM: HCPCS | Performed by: SURGERY

## 2023-12-12 PROCEDURE — C9290 INJ, BUPIVACAINE LIPOSOME: HCPCS | Performed by: STUDENT IN AN ORGANIZED HEALTH CARE EDUCATION/TRAINING PROGRAM

## 2023-12-12 PROCEDURE — 0DJ08ZZ INSPECTION OF UPPER INTESTINAL TRACT, VIA NATURAL OR ARTIFICIAL OPENING ENDOSCOPIC: ICD-10-PCS | Performed by: SURGERY

## 2023-12-12 PROCEDURE — 88307 TISSUE EXAM BY PATHOLOGIST: CPT | Performed by: PATHOLOGY

## 2023-12-12 PROCEDURE — 0DB64Z3 EXCISION OF STOMACH, PERCUTANEOUS ENDOSCOPIC APPROACH, VERTICAL: ICD-10-PCS | Performed by: SURGERY

## 2023-12-12 PROCEDURE — 43775 LAP SLEEVE GASTRECTOMY: CPT | Performed by: STUDENT IN AN ORGANIZED HEALTH CARE EDUCATION/TRAINING PROGRAM

## 2023-12-12 PROCEDURE — 81025 URINE PREGNANCY TEST: CPT | Performed by: ANESTHESIOLOGY

## 2023-12-12 PROCEDURE — 8E0W4CZ ROBOTIC ASSISTED PROCEDURE OF TRUNK REGION, PERCUTANEOUS ENDOSCOPIC APPROACH: ICD-10-PCS | Performed by: SURGERY

## 2023-12-12 DEVICE — SEAMGUARD STPL REINF INTUITIVE SUREFORM 60 BLACK: Type: IMPLANTABLE DEVICE | Site: ABDOMEN | Status: FUNCTIONAL

## 2023-12-12 RX ORDER — OXYCODONE HCL 5 MG/5 ML
10 SOLUTION, ORAL ORAL EVERY 4 HOURS PRN
Status: DISCONTINUED | OUTPATIENT
Start: 2023-12-12 | End: 2023-12-13 | Stop reason: HOSPADM

## 2023-12-12 RX ORDER — ENOXAPARIN SODIUM 100 MG/ML
40 INJECTION SUBCUTANEOUS EVERY 24 HOURS
Status: DISCONTINUED | OUTPATIENT
Start: 2023-12-13 | End: 2023-12-13 | Stop reason: HOSPADM

## 2023-12-12 RX ORDER — FENTANYL CITRATE 50 UG/ML
INJECTION, SOLUTION INTRAMUSCULAR; INTRAVENOUS AS NEEDED
Status: DISCONTINUED | OUTPATIENT
Start: 2023-12-12 | End: 2023-12-12

## 2023-12-12 RX ORDER — ROCURONIUM BROMIDE 10 MG/ML
INJECTION, SOLUTION INTRAVENOUS AS NEEDED
Status: DISCONTINUED | OUTPATIENT
Start: 2023-12-12 | End: 2023-12-12

## 2023-12-12 RX ORDER — SUCCINYLCHOLINE/SOD CL,ISO/PF 100 MG/5ML
SYRINGE (ML) INTRAVENOUS AS NEEDED
Status: DISCONTINUED | OUTPATIENT
Start: 2023-12-12 | End: 2023-12-12

## 2023-12-12 RX ORDER — BUPIVACAINE HYDROCHLORIDE 5 MG/ML
INJECTION, SOLUTION EPIDURAL; INTRACAUDAL AS NEEDED
Status: DISCONTINUED | OUTPATIENT
Start: 2023-12-12 | End: 2023-12-12 | Stop reason: HOSPADM

## 2023-12-12 RX ORDER — ACETAMINOPHEN 325 MG/1
975 TABLET ORAL EVERY 8 HOURS
Status: DISCONTINUED | OUTPATIENT
Start: 2023-12-12 | End: 2023-12-13 | Stop reason: HOSPADM

## 2023-12-12 RX ORDER — HYDROMORPHONE HCL/PF 1 MG/ML
0.5 SYRINGE (ML) INJECTION
Status: DISCONTINUED | OUTPATIENT
Start: 2023-12-12 | End: 2023-12-12 | Stop reason: HOSPADM

## 2023-12-12 RX ORDER — PROPOFOL 10 MG/ML
INJECTION, EMULSION INTRAVENOUS AS NEEDED
Status: DISCONTINUED | OUTPATIENT
Start: 2023-12-12 | End: 2023-12-12

## 2023-12-12 RX ORDER — SODIUM CHLORIDE 9 MG/ML
125 INJECTION, SOLUTION INTRAVENOUS CONTINUOUS
Status: DISCONTINUED | OUTPATIENT
Start: 2023-12-12 | End: 2023-12-13 | Stop reason: HOSPADM

## 2023-12-12 RX ORDER — SIMETHICONE 80 MG
80 TABLET,CHEWABLE ORAL 4 TIMES DAILY PRN
Status: DISCONTINUED | OUTPATIENT
Start: 2023-12-12 | End: 2023-12-13 | Stop reason: HOSPADM

## 2023-12-12 RX ORDER — CELECOXIB 200 MG/1
200 CAPSULE ORAL ONCE
Status: COMPLETED | OUTPATIENT
Start: 2023-12-12 | End: 2023-12-12

## 2023-12-12 RX ORDER — ACETAMINOPHEN 10 MG/ML
1000 INJECTION, SOLUTION INTRAVENOUS EVERY 6 HOURS SCHEDULED
Status: DISCONTINUED | OUTPATIENT
Start: 2023-12-12 | End: 2023-12-13 | Stop reason: HOSPADM

## 2023-12-12 RX ORDER — OXYCODONE HCL 5 MG/5 ML
5 SOLUTION, ORAL ORAL EVERY 4 HOURS PRN
Status: DISCONTINUED | OUTPATIENT
Start: 2023-12-12 | End: 2023-12-13 | Stop reason: HOSPADM

## 2023-12-12 RX ORDER — ENOXAPARIN SODIUM 100 MG/ML
40 INJECTION SUBCUTANEOUS ONCE
Status: COMPLETED | OUTPATIENT
Start: 2023-12-12 | End: 2023-12-12

## 2023-12-12 RX ORDER — ACETAMINOPHEN 10 MG/ML
1000 INJECTION, SOLUTION INTRAVENOUS
Status: COMPLETED | OUTPATIENT
Start: 2023-12-12 | End: 2023-12-12

## 2023-12-12 RX ORDER — MIDAZOLAM HYDROCHLORIDE 2 MG/2ML
INJECTION, SOLUTION INTRAMUSCULAR; INTRAVENOUS AS NEEDED
Status: DISCONTINUED | OUTPATIENT
Start: 2023-12-12 | End: 2023-12-12

## 2023-12-12 RX ORDER — ONDANSETRON 2 MG/ML
INJECTION INTRAMUSCULAR; INTRAVENOUS AS NEEDED
Status: DISCONTINUED | OUTPATIENT
Start: 2023-12-12 | End: 2023-12-12

## 2023-12-12 RX ORDER — LIDOCAINE HYDROCHLORIDE 20 MG/ML
INJECTION, SOLUTION EPIDURAL; INFILTRATION; INTRACAUDAL; PERINEURAL AS NEEDED
Status: DISCONTINUED | OUTPATIENT
Start: 2023-12-12 | End: 2023-12-12

## 2023-12-12 RX ORDER — CEFAZOLIN SODIUM 2 G/50ML
2000 SOLUTION INTRAVENOUS ONCE
Status: COMPLETED | OUTPATIENT
Start: 2023-12-12 | End: 2023-12-12

## 2023-12-12 RX ORDER — DIPHENHYDRAMINE HCL 25 MG
25 TABLET ORAL EVERY 8 HOURS PRN
Status: DISCONTINUED | OUTPATIENT
Start: 2023-12-12 | End: 2023-12-13 | Stop reason: HOSPADM

## 2023-12-12 RX ORDER — METOCLOPRAMIDE HYDROCHLORIDE 5 MG/ML
10 INJECTION INTRAMUSCULAR; INTRAVENOUS EVERY 6 HOURS PRN
Status: DISCONTINUED | OUTPATIENT
Start: 2023-12-12 | End: 2023-12-13 | Stop reason: HOSPADM

## 2023-12-12 RX ORDER — PROMETHAZINE HYDROCHLORIDE 25 MG/ML
25 INJECTION, SOLUTION INTRAMUSCULAR; INTRAVENOUS EVERY 6 HOURS PRN
Status: DISCONTINUED | OUTPATIENT
Start: 2023-12-12 | End: 2023-12-13 | Stop reason: HOSPADM

## 2023-12-12 RX ORDER — ONDANSETRON 2 MG/ML
4 INJECTION INTRAMUSCULAR; INTRAVENOUS EVERY 6 HOURS PRN
Status: DISCONTINUED | OUTPATIENT
Start: 2023-12-12 | End: 2023-12-13 | Stop reason: HOSPADM

## 2023-12-12 RX ORDER — FAMOTIDINE 10 MG/ML
20 INJECTION, SOLUTION INTRAVENOUS 2 TIMES DAILY
Status: DISCONTINUED | OUTPATIENT
Start: 2023-12-12 | End: 2023-12-13 | Stop reason: HOSPADM

## 2023-12-12 RX ORDER — FENTANYL CITRATE/PF 50 MCG/ML
50 SYRINGE (ML) INJECTION
Status: DISCONTINUED | OUTPATIENT
Start: 2023-12-12 | End: 2023-12-12 | Stop reason: HOSPADM

## 2023-12-12 RX ORDER — ONDANSETRON 2 MG/ML
4 INJECTION INTRAMUSCULAR; INTRAVENOUS ONCE AS NEEDED
Status: DISCONTINUED | OUTPATIENT
Start: 2023-12-12 | End: 2023-12-12 | Stop reason: HOSPADM

## 2023-12-12 RX ORDER — SODIUM CHLORIDE 9 MG/ML
INJECTION INTRAVENOUS AS NEEDED
Status: DISCONTINUED | OUTPATIENT
Start: 2023-12-12 | End: 2023-12-12 | Stop reason: HOSPADM

## 2023-12-12 RX ORDER — ACETAMINOPHEN 160 MG/5ML
975 SUSPENSION ORAL EVERY 8 HOURS
Status: DISCONTINUED | OUTPATIENT
Start: 2023-12-12 | End: 2023-12-13 | Stop reason: HOSPADM

## 2023-12-12 RX ORDER — DEXAMETHASONE SODIUM PHOSPHATE 10 MG/ML
INJECTION, SOLUTION INTRAMUSCULAR; INTRAVENOUS AS NEEDED
Status: DISCONTINUED | OUTPATIENT
Start: 2023-12-12 | End: 2023-12-12

## 2023-12-12 RX ORDER — SODIUM CHLORIDE, SODIUM LACTATE, POTASSIUM CHLORIDE, CALCIUM CHLORIDE 600; 310; 30; 20 MG/100ML; MG/100ML; MG/100ML; MG/100ML
INJECTION, SOLUTION INTRAVENOUS CONTINUOUS PRN
Status: DISCONTINUED | OUTPATIENT
Start: 2023-12-12 | End: 2023-12-12

## 2023-12-12 RX ORDER — SODIUM CHLORIDE, SODIUM LACTATE, POTASSIUM CHLORIDE, CALCIUM CHLORIDE 600; 310; 30; 20 MG/100ML; MG/100ML; MG/100ML; MG/100ML
100 INJECTION, SOLUTION INTRAVENOUS CONTINUOUS
Status: DISCONTINUED | OUTPATIENT
Start: 2023-12-12 | End: 2023-12-13 | Stop reason: HOSPADM

## 2023-12-12 RX ORDER — KETOROLAC TROMETHAMINE 30 MG/ML
15 INJECTION, SOLUTION INTRAMUSCULAR; INTRAVENOUS EVERY 6 HOURS SCHEDULED
Status: DISPENSED | OUTPATIENT
Start: 2023-12-12 | End: 2023-12-13

## 2023-12-12 RX ADMIN — ONDANSETRON 4 MG: 2 INJECTION INTRAMUSCULAR; INTRAVENOUS at 08:42

## 2023-12-12 RX ADMIN — SUGAMMADEX 300 MG: 100 INJECTION, SOLUTION INTRAVENOUS at 08:55

## 2023-12-12 RX ADMIN — FENTANYL CITRATE 50 MCG: 50 INJECTION INTRAMUSCULAR; INTRAVENOUS at 08:42

## 2023-12-12 RX ADMIN — LIDOCAINE HYDROCHLORIDE 100 MG: 20 INJECTION, SOLUTION EPIDURAL; INFILTRATION; INTRACAUDAL at 07:40

## 2023-12-12 RX ADMIN — ROCURONIUM BROMIDE 50 MG: 10 INJECTION, SOLUTION INTRAVENOUS at 07:40

## 2023-12-12 RX ADMIN — FENTANYL CITRATE 50 MCG: 50 INJECTION INTRAMUSCULAR; INTRAVENOUS at 07:40

## 2023-12-12 RX ADMIN — DEXAMETHASONE SODIUM PHOSPHATE 5 MG: 10 INJECTION INTRAMUSCULAR; INTRAVENOUS at 07:50

## 2023-12-12 RX ADMIN — Medication 120 MG: at 07:40

## 2023-12-12 RX ADMIN — FENTANYL CITRATE 50 MCG: 50 INJECTION INTRAMUSCULAR; INTRAVENOUS at 09:16

## 2023-12-12 RX ADMIN — KETOROLAC TROMETHAMINE 15 MG: 30 INJECTION, SOLUTION INTRAMUSCULAR; INTRAVENOUS at 13:20

## 2023-12-12 RX ADMIN — ACETAMINOPHEN 1000 MG: 10 INJECTION INTRAVENOUS at 23:17

## 2023-12-12 RX ADMIN — ENOXAPARIN SODIUM 40 MG: 40 INJECTION SUBCUTANEOUS at 06:35

## 2023-12-12 RX ADMIN — ACETAMINOPHEN 1000 MG: 10 INJECTION INTRAVENOUS at 07:07

## 2023-12-12 RX ADMIN — FENTANYL CITRATE 50 MCG: 50 INJECTION INTRAMUSCULAR; INTRAVENOUS at 09:41

## 2023-12-12 RX ADMIN — ROCURONIUM BROMIDE 10 MG: 10 INJECTION, SOLUTION INTRAVENOUS at 08:21

## 2023-12-12 RX ADMIN — SIMETHICONE 80 MG: 80 TABLET, CHEWABLE ORAL at 14:59

## 2023-12-12 RX ADMIN — MIDAZOLAM 2 MG: 1 INJECTION INTRAMUSCULAR; INTRAVENOUS at 07:26

## 2023-12-12 RX ADMIN — PROPOFOL 200 MG: 10 INJECTION, EMULSION INTRAVENOUS at 07:40

## 2023-12-12 RX ADMIN — SODIUM CHLORIDE 125 ML/HR: 0.9 INJECTION, SOLUTION INTRAVENOUS at 06:30

## 2023-12-12 RX ADMIN — SODIUM CHLORIDE, SODIUM LACTATE, POTASSIUM CHLORIDE, AND CALCIUM CHLORIDE 100 ML/HR: .6; .31; .03; .02 INJECTION, SOLUTION INTRAVENOUS at 13:20

## 2023-12-12 RX ADMIN — FAMOTIDINE 20 MG: 10 INJECTION INTRAVENOUS at 20:38

## 2023-12-12 RX ADMIN — ACETAMINOPHEN 1000 MG: 10 INJECTION INTRAVENOUS at 17:28

## 2023-12-12 RX ADMIN — CELECOXIB 200 MG: 200 CAPSULE ORAL at 06:21

## 2023-12-12 RX ADMIN — SODIUM CHLORIDE, SODIUM LACTATE, POTASSIUM CHLORIDE, AND CALCIUM CHLORIDE: .6; .31; .03; .02 INJECTION, SOLUTION INTRAVENOUS at 07:58

## 2023-12-12 RX ADMIN — KETOROLAC TROMETHAMINE 15 MG: 30 INJECTION, SOLUTION INTRAMUSCULAR; INTRAVENOUS at 19:20

## 2023-12-12 RX ADMIN — CEFAZOLIN SODIUM 2000 MG: 2 SOLUTION INTRAVENOUS at 07:35

## 2023-12-12 RX ADMIN — SODIUM CHLORIDE 150 MG: 0.9 INJECTION, SOLUTION INTRAVENOUS at 06:30

## 2023-12-12 NOTE — ANESTHESIA POSTPROCEDURE EVALUATION
Post-Op Assessment Note    CV Status:  Stable    Pain management: adequate       Mental Status:  Alert and awake   Hydration Status:  Euvolemic   PONV Controlled:  Controlled   Airway Patency:  Patent     Post Op Vitals Reviewed: Yes      Staff: Anesthesiologist               BP      Temp      Pulse     Resp      SpO2      /67   Pulse 68   Temp 97.8 °F (36.6 °C)   Resp 16   Ht 5' 4.5" (1.638 m)   Wt 110 kg (241 lb 6.5 oz)   LMP 10/26/2023 (Approximate)   SpO2 97%   BMI 40.80 kg/m²

## 2023-12-12 NOTE — INTERVAL H&P NOTE
H&P reviewed. After examining the patient I find no changes in the patients condition since the H&P had been written.     Vitals:    12/12/23 0647   BP: 108/59   Pulse: 83   Resp: 18   Temp: 97.5 °F (36.4 °C)   SpO2: 96%

## 2023-12-12 NOTE — OP NOTE
[facility]   [address] [place] [address], [address] [place] [address], [address] [facility]  [phone] (Fax)      Operative Report  GASTRECTOMY  SLEEVE W/ ROBOTICS & INTRAOPERATIVE EGD     Patient Name: Conchis Guzman    :  1970  MRN: [id number]  Patient Location: [facility] OR ROOM 07  Surgery Date : 2023  Surgeons:  Surgeon(s) and Role:     * Safia Schneider MD - Primary     * La Nena Mendoza MD - Assisting    Diagnosis:    Pre-Op Diagnosis Codes: Morbid obesity ([address]) [E66.01]  Body mass index is 40.8 kg/m². Gastroesophageal reflux disease, unspecified whether esophagitis present [K21.9]  Mixed hyperlipidemia [E78.2]    Post-Op Diagnosis Codes:     * Morbid obesity ([address]) [E66.01]     * Gastroesophageal reflux disease, unspecified whether esophagitis present [K21.9]     * Mixed hyperlipidemia [E78.2]     * Body mass index is 40.8 kg/m². Procedure  Intraoperative Endoscopy  Laparoscopic Robotically Assisted Sleeve Gastrectomy    Specimen(s):  * No specimens in log *    Estimated Blood Loss:    20 mL    Anesthesia Type:     General    Operative Indications: Morbid obesity ([address]) [E66.01]  Gastroesophageal reflux disease, unspecified whether esophagitis present [K21.9]  Mixed hyperlipidemia [E78.2]  Body mass index is 40.8 kg/m². Operative Findings:    Dense adhesions in the mid and lower abdomen midline    Complications:     None    Procedure and Technique:    INDICATION    Conchis Guzman is a 48 y.o. female with a Body mass index is 40.8 kg/m². and a long standing history of morbid obesity and inability to lose a significant amount of weight on its own. This patient was found to be a good candidate to undergo a bariatric procedure upon being enrolled here at the [address] [place] [address]. OPERATIVE TECHNIQUE    The patient was taken to the operating room and placed in a supine position. A dose of IV antibiotic prophylaxis that consisted of Ancef 2g was given.    Also 40 mg of subcutaneous Enoxaparin to prevent deep vein thrombosis were administered. Sequential compression devices were placed on both lower extremities. After satisfactory general anesthesia induction and endotracheal intubation was achieved, the extremities were placed and properly secured to prevent neuromuscular damage as best as possible. Subsequently, the abdominal wall was prepped and draped in a surgical standard sterile fashion. After a timeout was done and the patient was properly identified and the type of procedure was confirmed  access to the peritoneal cavity was gained with standard Veress needle technique and an optical trocar. With this device, we were able to visualize the layers of the abdominal wall, and enter the peritoneal cavity under direct visualization. Pneumoperitoneum was then established with CO2 insufflation. A four quadrant transversus abdominis plane block was performed under direct laparoscopic vision. There were adhesions involving the omentum to the anterior abdominal wall possibly involving the transverse colon but these were lower from the line where we were placing our trocars    Four additional trocars were placed: an 8 mm in the right flank in the anterior axillary line, a 12-mm port was placed in the right flank midclavicular line, a 8-mm port was placed in the left flank  midclavicular line and another 12-mm port was placed in the left flank anterior axillary line lateral to the supraumbilical port. The [facility] liver retractor was placed in the subxiphoid position through the use of a 5-mm trocar incision. The patient was repositioned to a reverse Trendelenburg position and the robot was docked    With the trocars in place, the dissection was begun. We divided the gastrocolic ligament with the energy device to enter the lesser sac. We continued to divide this ligament along the greater curvature of the stomach towards the angle of His.  Special care was taken while dividing the short gastric vessels close to the spleen. This process was completely hemostatic. We then turned to the creation of an elongated and thin gastric pouch. A 36 French calibration tube was placed by the anesthesia staff into the stomach under our laparoscopic surveillance. Once the tip of the bougie was confirmed to be next to the pylorus, serial firings of the laparoscopic stapler with 60-mm cartridges were utilized. We started in a point inferior to the incisura angularis and the Crows foot nerve looking to preserve the gastric emptying. This was 5 to 6 centimeters proximal to the pylorus. We created a pouch based on the lesser curve, and in vertical orientation. We continued the vertical serial firings of the stapler to the angle of His gently cinching the bougie with our laparoscopic stapler looking to create a thin pouch. As we approached the fundus of the stomach and the angle of His, the stapler loads were changed appropriately according to the variable thickness of the tissue and were reinforced with buttressing material as needed. This completely  the pouch from the remnant stomach. We then turned our attention to the newly created pouch and examined it for bleeding or obvious defects on the staple lines and none were found. The distal stomach pouch was occluded and an EGD as well as an air insufflation test was performed. Neither intraoperative bleeding nor leaks were detected. I then covered the most proximal aspect of the staple line with a tongue of omentum in a Earl patch fashion and secured it in place with a single 2/0 Vicryl stitch. The robot was undocked and the 12 mm right flank trocar site was dilated and the gastric remnant was externalized through it and passed off the surgical field to be sent to pathology. The sponge, needle and instrument count was reported complete.     The previously dilated trocar site was then closed with use of a suture closure device and a figure-of-eight with absorbable suture. The pneumoperitoneum was evacuated using the [facility] filter and smoke evacuator. The liver retractor and the remainder ports were then removed under direct laparoscopic visualization and no back bleeding was noted. The skin incisions were all closed with 4-0 absorbable subcuticular suture. The patient tolerated the procedure well, was extubated uneventfully and was transferred to the recovery room in stable condition. I was present for the entire length of the procedure as the attending of record. No qualified resident was available to assist.  The presence of an assistant was necessary for camera holding, traction and counter traction and for help with suturing and stapling in addition to performing the intraop-EGD.     Patient Disposition:    PACU     Signature: Luzmaria Enriquez MD  Date: December 12, 2023  Time: 8:59 AM

## 2023-12-12 NOTE — PLAN OF CARE
Problem: PAIN - ADULT  Goal: Verbalizes/displays adequate comfort level or baseline comfort level  Description: Interventions:  - Encourage patient to monitor pain and request assistance  - Assess pain using appropriate pain scale  - Administer analgesics based on type and severity of pain and evaluate response  - Implement non-pharmacological measures as appropriate and evaluate response  - Consider cultural and social influences on pain and pain management  - Notify physician/advanced practitioner if interventions unsuccessful or patient reports new pain  Outcome: Progressing     Problem: SAFETY ADULT  Goal: Patient will remain free of falls  Description: INTERVENTIONS:  - Educate patient/family on patient safety including physical limitations  - Instruct patient to call for assistance with activity   - Consult OT/PT to assist with strengthening/mobility   - Keep Call bell within reach  - Keep bed low and locked with side rails adjusted as appropriate  - Keep care items and personal belongings within reach  - Initiate and maintain comfort rounds  - Make Fall Risk Sign visible to staff  - Apply yellow socks and bracelet for high fall risk patients  - Consider moving patient to room near nurses station  Outcome: Progressing     Problem: DISCHARGE PLANNING  Goal: Discharge to home or other facility with appropriate resources  Description: INTERVENTIONS:  - Identify barriers to discharge w/patient and caregiver  - Arrange for needed discharge resources and transportation as appropriate  - Identify discharge learning needs (meds, wound care, etc.)  - Arrange for interpretive services to assist at discharge as needed  - Refer to Case Management Department for coordinating discharge planning if the patient needs post-hospital services based on physician/advanced practitioner order or complex needs related to functional status, cognitive ability, or social support system  Outcome: Progressing     Problem: SKIN/TISSUE INTEGRITY - ADULT  Goal: Incision(s), wounds(s) or drain site(s) healing without S/S of infection  Description: INTERVENTIONS  - Assess and document dressing, incision, wound bed, drain sites and surrounding tissue  - Provide patient and family education.   Outcome: Progressing

## 2023-12-12 NOTE — ANESTHESIA PREPROCEDURE EVALUATION
Procedure:  GASTRECTOMY  SLEEVE W/ ROBOTICS & INTRAOPERATIVE EGD (Abdomen)    Relevant Problems   ANESTHESIA (within normal limits)      CARDIO   (+) Other hyperlipidemia      GI/HEPATIC   (+) GERD (gastroesophageal reflux disease)      MUSCULOSKELETAL   (+) Osteoarthritis      NEURO/PSYCH   (+) Generalized anxiety disorder        Physical Exam    Airway    Mallampati score: III  TM Distance: >3 FB  Neck ROM: full     Dental       Cardiovascular  Rhythm: regular, Rate: normal    Pulmonary   Breath sounds clear to auscultation    Other Findings  post-pubertal.      Anesthesia Plan  ASA Score- 3     Anesthesia Type- general with ASA Monitors. Additional Monitors:     Airway Plan: ETT. Plan Factors-Exercise tolerance (METS): >4 METS. Chart reviewed. EKG reviewed. Existing labs reviewed. Patient summary reviewed. Patient is not a current smoker. Patient not instructed to abstain from smoking on day of procedure. Patient did not smoke on day of surgery. Obstructive sleep apnea risk education given perioperatively. Induction- intravenous. Postoperative Plan-     Informed Consent- Anesthetic plan and risks discussed with patient and spouse (Jean).

## 2023-12-13 VITALS
TEMPERATURE: 98.3 F | SYSTOLIC BLOOD PRESSURE: 134 MMHG | HEIGHT: 65 IN | WEIGHT: 241 LBS | BODY MASS INDEX: 40.15 KG/M2 | DIASTOLIC BLOOD PRESSURE: 67 MMHG | RESPIRATION RATE: 17 BRPM | OXYGEN SATURATION: 97 % | HEART RATE: 62 BPM

## 2023-12-13 LAB
ANION GAP SERPL CALCULATED.3IONS-SCNC: 6 MMOL/L
BUN SERPL-MCNC: 8 MG/DL (ref 5–25)
CALCIUM SERPL-MCNC: 8.9 MG/DL (ref 8.4–10.2)
CHLORIDE SERPL-SCNC: 105 MMOL/L (ref 96–108)
CO2 SERPL-SCNC: 24 MMOL/L (ref 21–32)
CREAT SERPL-MCNC: 0.57 MG/DL (ref 0.6–1.3)
ERYTHROCYTE [DISTWIDTH] IN BLOOD BY AUTOMATED COUNT: 13 % (ref 11.6–15.1)
GFR SERPL CREATININE-BSD FRML MDRD: 107 ML/MIN/1.73SQ M
GLUCOSE SERPL-MCNC: 120 MG/DL (ref 65–140)
HCT VFR BLD AUTO: 34.2 % (ref 34.8–46.1)
HGB BLD-MCNC: 11.3 G/DL (ref 11.5–15.4)
MCH RBC QN AUTO: 29.4 PG (ref 26.8–34.3)
MCHC RBC AUTO-ENTMCNC: 33 G/DL (ref 31.4–37.4)
MCV RBC AUTO: 89 FL (ref 82–98)
PLATELET # BLD AUTO: 286 THOUSANDS/UL (ref 149–390)
PMV BLD AUTO: 9.9 FL (ref 8.9–12.7)
POTASSIUM SERPL-SCNC: 3.8 MMOL/L (ref 3.5–5.3)
RBC # BLD AUTO: 3.85 MILLION/UL (ref 3.81–5.12)
SODIUM SERPL-SCNC: 135 MMOL/L (ref 135–147)
WBC # BLD AUTO: 7.3 THOUSAND/UL (ref 4.31–10.16)

## 2023-12-13 PROCEDURE — 85027 COMPLETE CBC AUTOMATED: CPT | Performed by: PHYSICIAN ASSISTANT

## 2023-12-13 PROCEDURE — 80048 BASIC METABOLIC PNL TOTAL CA: CPT | Performed by: PHYSICIAN ASSISTANT

## 2023-12-13 PROCEDURE — NC001 PR NO CHARGE: Performed by: SURGERY

## 2023-12-13 PROCEDURE — 99024 POSTOP FOLLOW-UP VISIT: CPT | Performed by: STUDENT IN AN ORGANIZED HEALTH CARE EDUCATION/TRAINING PROGRAM

## 2023-12-13 RX ORDER — ACETAMINOPHEN 160 MG/5ML
975 SUSPENSION ORAL EVERY 8 HOURS
Qty: 638.4 ML | Refills: 0 | Status: SHIPPED | OUTPATIENT
Start: 2023-12-13 | End: 2023-12-20

## 2023-12-13 RX ADMIN — ENOXAPARIN SODIUM 40 MG: 40 INJECTION SUBCUTANEOUS at 11:09

## 2023-12-13 RX ADMIN — FAMOTIDINE 20 MG: 10 INJECTION INTRAVENOUS at 08:10

## 2023-12-13 RX ADMIN — KETOROLAC TROMETHAMINE 15 MG: 30 INJECTION, SOLUTION INTRAMUSCULAR; INTRAVENOUS at 05:22

## 2023-12-13 RX ADMIN — ACETAMINOPHEN 1000 MG: 10 INJECTION INTRAVENOUS at 11:10

## 2023-12-13 RX ADMIN — ACETAMINOPHEN 1000 MG: 10 INJECTION INTRAVENOUS at 05:22

## 2023-12-13 NOTE — RESTORATIVE TECHNICIAN NOTE
Restorative Technician Note      Patient Name: Maycol Mckoy     Restorative Tech Visit Date: 12/13/23  Note Type: Mobility  Patient Position Upon Consult: Supine  Activity Performed: Ambulated  Patient Position at End of Consult: Supine;  All needs within reach

## 2023-12-13 NOTE — DISCHARGE SUMMARY
Discharge Summary - Joanna Ann 48 y.o. female MRN: [id number]    Unit/Bed#: E5 -01 Encounter: [id number]      Pre-Operative Diagnosis: Pre-Op Diagnosis Codes:     * Morbid obesity ([address]) [E66.01]     * Gastroesophageal reflux disease, unspecified whether esophagitis present [K21.9]     * Mixed hyperlipidemia [E78.2]    Post-Operative Diagnosis: Post-Op Diagnosis Codes:     * Morbid obesity ([address]) [E66.01]     * Gastroesophageal reflux disease, unspecified whether esophagitis present [K21.9]     * Mixed hyperlipidemia [E78.2]    Procedures Performed:  Procedure(s):  GASTRECTOMY  SLEEVE W/ ROBOTICS & INTRAOPERATIVE EGD    Surgeon: Isabel Alejandro MD    See H & P for full details of admission and Operative Note for full details of operations performed. Patient tolerated surgery well without complications. In the morning postoperative Day 1, the patient had mild nausea and abdominal pain. Tolerated a clear liquid diet without vomiting. Able to ambulate and voiding independently. Patient was deemed ready for discharge home on [facility]. Patient was seen and examined prior to discharge. Provisions for Follow-Up Care:  See After Visit Summary/Discharge Instructions for information related to follow-up care and home orders. Disposition: Home, in stable condition. Planned Readmission: No    Discharge Medications:  See After Visit Summary/Discharge Instructions for reconciled discharge medications provided to patient and family. Post Operative instructions: Reviewed with patient and/or family.     Signature:   Navid Weber PA-C  Date: 12/13/2023 Time: 10:39 AM

## 2023-12-13 NOTE — DISCHARGE INSTRUCTIONS
your medications from [address] in [address] or cut your pills and open capsules, mix with liquid to drink. Take Tylenol every 8 hours around the clock, unless instructed otherwise  Take your omeprazole daily  It is important to stay hydrated and follow your discharge diet progression   Mild nausea is ok as long as you can drink fluids, sip very slowly and get up and walk during any periods of nausea  You may shower normally after 48 hours, but do not scrub incision sites, blot gently with clean towel to dry incisions  Take home medications as usual unless instructed otherwise while in hospital  Follow up with Dr. Marlee Ruiz and your PCP within the next week  Sleeve gastrectomy patients ONLY: Complete full course of lovenox injections!

## 2023-12-13 NOTE — UTILIZATION REVIEW
Initial Clinical Review    Elective  Ip   surgical procedure    Age/Sex: 48 y.o. female    Surgery Date:  12/12/23    Procedure: Intraoperative Endoscopy  Laparoscopic Robotically Assisted Sleeve Gastrectomy       Anesthesia:  general    Operative Findings: Dense adhesions in the mid and lower abdomen midline     POD#1 Progress Note:   12/13  D/C  home    Admission Orders: Date/Time/Statement:   Admission Orders (From admission, onward)       Ordered        12/12/23 0845  Inpatient Admission  Once                          Orders Placed This Encounter   Procedures    Inpatient Admission     Standing Status:   Standing     Number of Occurrences:   1     Order Specific Question:   Level of Care     Answer:   Med Surg [16]     Order Specific Question:   Estimated length of stay     Answer:   Inpatient Only Surgery     Vital Signs: /67   Pulse 62   Temp 98.3 °F (36.8 °C)   Resp 17   Ht 5' 4.5" (1.638 m)   Wt 109 kg (241 lb)   LMP 10/26/2023 (Approximate)   SpO2 97%   BMI 40.73 kg/m²     Pertinent Labs/Diagnostic Test Results:     Results from last 7 days   Lab Units 12/13/23  0444   WBC Thousand/uL 7.30   HEMOGLOBIN g/dL 11.3*   HEMATOCRIT % 34.2*   PLATELETS Thousands/uL 286         Results from last 7 days   Lab Units 12/13/23  0444   SODIUM mmol/L 135   POTASSIUM mmol/L 3.8   CHLORIDE mmol/L 105   CO2 mmol/L 24   ANION GAP mmol/L 6   BUN mg/dL 8   CREATININE mg/dL 0.57*   EGFR ml/min/1.73sq m 107   CALCIUM mg/dL 8.9         Results from last 7 days   Lab Units 12/12/23  0909   POC GLUCOSE mg/dl 95     Results from last 7 days   Lab Units 12/13/23  0444   GLUCOSE RANDOM mg/dL 120                         Diet:  bariatric cl liq    Mobility:  Up as  tolerated    DVT Prophylaxis:  SCD's    Medications/Pain Control:   Scheduled Medications:  acetaminophen, 1,000 mg, Intravenous, Q6H YU  acetaminophen, 975 mg, Oral, Q8H   Or  acetaminophen, 975 mg, Oral, Q8H  enoxaparin, 40 mg, Subcutaneous, Q24H  famotidine, 20 mg, Intravenous, BID  ketorolac, 15 mg, Intravenous, Q6H [address]      Continuous IV Infusions:  lactated ringers, 100 mL/hr, Intravenous, Continuous  sodium chloride, 125 mL/hr, Intravenous, Continuous      PRN Meds:  diphenhydrAMINE, 25 mg, Oral, Q8H PRN  metoclopramide, 10 mg, Intravenous, Q6H PRN  ondansetron, 4 mg, Intravenous, Q6H PRN  oxyCODONE, 10 mg, Oral, Q4H PRN  oxyCODONE, 5 mg, Oral, Q4H PRN  phenol, 2 spray, Mouth/Throat, Q2H PRN  promethazine, 25 mg, Intravenous, Q6H PRN  simethicone, 80 mg, Oral, 4x Daily PRN        Network Utilization Review Department  ATTENTION: Please call with any questions or concerns to [phone] and carefully listen to the prompts so that you are directed to the right person. All voicemails are confidential.   For Discharge needs, contact Care Management DC Support Team at [phone] opt. 2  Send all requests for admission clinical reviews, approved or denied determinations and any other requests to dedicated fax number below belonging to the campus where the patient is receiving treatment.  List of dedicated fax numbers for the Facilities:  [place] DENIALS (Administrative/Medical Necessity) [phone]   DISCHARGE SUPPORT TEAM (NETWORK) [id number] (Maternity/NICU/Pediatrics) [phone]   [address] [facility] [phone]   [address] [facility] [address] [facility] [facility] [address] [address] [facility] [address] [facility]  [address] [phone]

## 2023-12-13 NOTE — PROGRESS NOTES
Progress Note - Bariatric Surgery   Lamonte Silva 48 y.o. female MRN: [id number]  Unit/Bed#: E5 -01 Encounter: [id number]      Subjective/Objective     Subjective:  Patient with history of obesity POD1 s/p robotic sleeve gastrectomy . Patient denies fevers, chills, sweats, SOB, CP, calf pain. Pain adequately controlled on oral pain medication. Ambulating without assistance, voiding well, and using incentive spirometer. Tolerating liquid diet without nausea or vomiting today. Vital signs stable. Labs within normal limits    Objective:    /68   Pulse 74   Temp 98.3 °F (36.8 °C)   Resp 17   Ht 5' 4.5" (1.638 m)   Wt 109 kg (241 lb)   LMP 10/26/2023 (Approximate)   SpO2 97%   BMI 40.73 kg/m²       Intake/Output Summary (Last 24 hours) at 12/13/2023 0758  Last data filed at 12/13/2023 0522  Gross per 24 hour   Intake 2110 ml   Output 1520 ml   Net 590 ml       Invasive Devices       Peripheral Intravenous Line  Duration             Peripheral IV 12/12/23 Dorsal (posterior); Right Forearm 1 day                    ROS: 10-point system completed. All negative except see HPI.     Physical Exam    General Appearance:    Alert, cooperative, no distress, appears stated age   Head:    Normocephalic, without obvious abnormality, atraumatic   Lungs:     Respirations unlabored   Heart:    Regular rate and rhythm   Abdomen:     Soft, appropriate tenderness, no masses, no organomegaly, non-distended   Extremities:   Extremities normal, atraumatic, no cyanosis or edema   Neurologic:  Incision:  Psych:   Normal strength and sensation    Clean, dry, and intact    Normal mood and affect       Lab, Imaging and other studies:CBC:   Lab Results   Component Value Date    WBC 7.30 12/13/2023    HGB 11.3 (L) 12/13/2023    HCT 34.2 (L) 12/13/2023    MCV 89 12/13/2023     12/13/2023    RBC 3.85 12/13/2023    MCH 29.4 12/13/2023    MCHC 33.0 12/13/2023    RDW 13.0 12/13/2023    MPV 9.9 12/13/2023        VTE Mechanical Prophylaxis: sequential compression device    Assessment/Plan  1)  Patient with a history of Obesity s/p robotic Sleeve Gastrectomy with stable post op course. Patient afebrile and hemodynamically stable. - Encourage PO fluids  - Recommend ambulation, use of SCDs when not ambulating, and incentive spirometry.   - Ok to give Lovenox   - Plan to D/C patient home today pending anticipated progression    Plan of care was discussed with patient.   Care plan discussed with Dr. Mckenzie Whtie MD  Bariatric Surgery  12/13/2023  7:58 AM

## 2023-12-13 NOTE — PLAN OF CARE
Problem: PAIN - ADULT  Goal: Verbalizes/displays adequate comfort level or baseline comfort level  Description: Interventions:  - Encourage patient to monitor pain and request assistance  - Assess pain using appropriate pain scale  - Administer analgesics based on type and severity of pain and evaluate response  - Implement non-pharmacological measures as appropriate and evaluate response  - Consider cultural and social influences on pain and pain management  - Notify physician/advanced practitioner if interventions unsuccessful or patient reports new pain  12/12/2023 2031 by Andrew Nathan RN  Outcome: Progressing  12/12/2023 2031 by Andrew Nathan RN  Outcome: Progressing     Problem: SAFETY ADULT  Goal: Patient will remain free of falls  Description: INTERVENTIONS:  - Educate patient/family on patient safety including physical limitations  - Instruct patient to call for assistance with activity   - Consult OT/PT to assist with strengthening/mobility   - Keep Call bell within reach  - Keep bed low and locked with side rails adjusted as appropriate  - Keep care items and personal belongings within reach  - Initiate and maintain comfort rounds  - Make Fall Risk Sign visible to staff    Problem: DISCHARGE PLANNING  Goal: Discharge to home or other facility with appropriate resources  Description: INTERVENTIONS:  - Identify barriers to discharge w/patient and caregiver  - Arrange for needed discharge resources and transportation as appropriate  - Identify discharge learning needs (meds, wound care, etc.)  - Arrange for interpretive services to assist at discharge as needed  - Refer to Case Management Department for coordinating discharge planning if the patient needs post-hospital services based on physician/advanced practitioner order or complex needs related to functional status, cognitive ability, or social support system  12/12/2023 2031 by Andrew Nathan RN  Outcome: Progressing  12/12/2023 2031 by Andrew Nathan RN  Outcome: Progressing     Problem: SKIN/TISSUE INTEGRITY - ADULT  Goal: Incision(s), wounds(s) or drain site(s) healing without S/S of infection  Description: INTERVENTIONS  - Assess and document dressing, incision, wound bed, drain sites and surrounding tissue  - Provide patient and family education  12/12/2023 2031 by Chelsea Ratliff RN  Outcome: Progressing  12/12/2023 2031 by Chelsea Ratliff RN  Outcome: Progressing   - Apply yellow socks and bracelet for high fall risk patients  - Consider moving patient to room near nurses station  12/12/2023 2031 by Chelsea Ratliff RN  Outcome: Progressing  12/12/2023 2031 by Chelsea Ratliff RN  Outcome: Progressing

## 2023-12-13 NOTE — DISCHARGE INSTR - AVS FIRST PAGE
Bariatric/Weight Loss Surgery  Hospital Discharge Instructions  ACTIVITY:  Progress as feels comfortable - a good rule is:  if you are doing something and it begins to hurt, stop doing the activity. Walk every hour while at home. You may walk stairs if you do so slowly  You may shower 48 hours after surgery. Do not scrub incision sites. Blot gently with clean towel to dry incisions. (see #4 below)   Use your incentive spirometer 10 times per hour while awake for 1 week after surgery. Do NOT drive for 48 hours after surgery. No driving 24 hours after taking certain prescription pain medications. Examples of such medication are Percocet, Darvocet, Oxycodone, Tylenol #3, and Tylenol with Codeine. DIET  Stay on a liquid diet for 7 days after your surgery date, sipping slowly. Refer to your manual for examples of choices. Remember to keep your liquids sugar free or low calorie. You may have protein drinks. Make sure to drink 48 to 64 ounces per day of fluids. You may advance to a pureed diet one week after surgery as instructed by your diet progression pamphlet. Once you get approval from your surgeon at your first post operative visit, you may advance to the soft diet and remain on soft diet for 8 weeks unless otherwise instructed. MEDICATIONS:  The abdominal nerve block will wear off during the first 1-2 days that you are home, and you may become sore (especially over incision site/sites where abdominal wall is sutured). This may create a pulling sensation, especially while moving around, and will fade over time. Continue to take your Tylenol and your pain medication as instructed. Start vitamins and minerals one week after surgery or when you start stage 3/puree diet. Anti-acid Medication as per prescription. Other medications as indicated on the Physician Patient Discharge Instructions form given to you at the time of discharge.   Make sure that you are splitting your pill or tablet medications in halves or fourths or even crushing them before you take them. Capsules should be opened and mixed with water or jello. You need to do this for at least 4 weeks after surgery. Eventually you will be able to take your medications the regular way as they were prescribed. You will need to consult with your Family Doctor in regards to all your prescribed medication, particularly those for blood pressure and diabetes. As you lose weight, medical conditions may change, requiring an alteration or elimination of the drug dose. Monitor blood pressure closely and call PCP with any concerns. Sleeve Gastrectomy patients ONLY:  Complete full course of lovenox injections! DO NOT TAKE BIRTH CONTROL(BC) MEDICATIONS, INSERT BC VAGINAL RINGS, OR PLACE IUD OR ANY OTHER BC METHODS UNTIL 31 DAYS FROM DAY OF DISCHARGE FROM HOSPITAL. THIS PLACES YOU AT HIGH RISK FOR A POTENTIALLY LIFE THREATENING BLOOD CLOT. Remember to always use barrier methods for birth control and speak to your GYN about using two forms of birth control to start 31 days after surgery. It is very important to avoid pregnancy until at least 18-24 months after surgery. INCISION CARE  You may shower and get incisions wet 2 days after surgery. No soaking tub baths or swimming for 30 days after surgery. Keep abdominal area and incisions clean. Use soap and water to create a good lather and rinse off. Do not scrub incisions. If you have a drain, empty the drain as the nurses instructed. FOLLOW-UP APPOINTMENT should be made for one week after discharge. Call surgeon’s office at [phone] to schedule an appointment.     CALL YOUR DOCTOR FOR:  pain not controlled by pain medications, a temperature greater than 101.5° F, any increase or change in drainage or redness from any incision, any vomiting or inability to keep liquids down, shortness of breath, shoulder pain, or bleeding

## 2023-12-14 ENCOUNTER — TELEPHONE (OUTPATIENT)
Dept: MEDSURG UNIT | Facility: HOSPITAL | Age: 53
End: 2023-12-14

## 2023-12-14 ENCOUNTER — TRANSITIONAL CARE MANAGEMENT (OUTPATIENT)
Dept: FAMILY MEDICINE CLINIC | Facility: CLINIC | Age: 53
End: 2023-12-14

## 2023-12-14 DIAGNOSIS — R11.0 NAUSEA: Primary | ICD-10-CM

## 2023-12-14 PROCEDURE — 88307 TISSUE EXAM BY PATHOLOGIST: CPT | Performed by: PATHOLOGY

## 2023-12-14 RX ORDER — ONDANSETRON 4 MG/1
4 TABLET, FILM COATED ORAL EVERY 8 HOURS PRN
Qty: 20 TABLET | Refills: 0 | Status: SHIPPED | OUTPATIENT
Start: 2023-12-14

## 2023-12-14 NOTE — UTILIZATION REVIEW
NOTIFICATION OF ADMISSION DISCHARGE   This is a Notification of Discharge from [facility]. Please be advised that this patient has been discharge from our facility. Below you will find the admission and discharge date and time including the patient’s disposition. UTILIZATION REVIEW CONTACT:  Phong Garcia MA  Utilization   Network Utilization Review Department  Phone: [phone] x carefully listen to the prompts. All voicemails are confidential.  Email: [email or web address]     ADMISSION INFORMATION  PRESENTATION DATE: 12/12/2023  5:20 AM  OBERVATION ADMISSION DATE:   INPATIENT ADMISSION DATE: 12/12/23  8:45 AM   DISCHARGE DATE: 12/13/2023  4:02 PM   DISPOSITION:Home/Self Care    Network Utilization Review Department  ATTENTION: Please call with any questions or concerns to [phone] and carefully listen to the prompts so that you are directed to the right person. All voicemails are confidential.   For Discharge needs, contact Care Management DC Support Team at [phone] opt. 2  Send all requests for admission clinical reviews, approved or denied determinations and any other requests to dedicated fax number below belonging to the campus where the patient is receiving treatment.  List of dedicated fax numbers for the Facilities:  [place] DENIALS (Administrative/Medical Necessity) [phone]   DISCHARGE SUPPORT TEAM (Network) [phone] [address] [facility] (Maternity/NICU/Pediatrics) [phone]   [address] [facility] [address] [place] [address] [address] [phone]

## 2023-12-14 NOTE — TELEPHONE ENCOUNTER
Post op follow up phone call completed. Pt is sipping liquids and has consumed about 16 oz but struggling with nausea. Nausea is not keeping her from drinking but not able to drink as frequently as she should. Using IS as instructed, reinforced importance of using IS to help prevent pneumonia. Ambulating about home without difficulty. Minimal pain, not using Oxycodone. Reaffirmed examples of liquid diet over the next week. Passing gas, no bm, will start miralax today. Pt stated understanding about discharge instructions and medication adjustments. Tolerating self administration of Lovenox injections without difficulty. Follow up appt with surgeon scheduled for next week. Instructed to call with any additional questions or concerns. Tiger text sent to Anisha Asher, Fellow requesting libby order.

## 2023-12-18 ENCOUNTER — LAB (OUTPATIENT)
Dept: LAB | Facility: AMBULARY SURGERY CENTER | Age: 53
End: 2023-12-18
Payer: COMMERCIAL

## 2023-12-18 DIAGNOSIS — E55.9 VITAMIN D DEFICIENCY: ICD-10-CM

## 2023-12-18 LAB — 25(OH)D3 SERPL-MCNC: 30.6 NG/ML (ref 30–100)

## 2023-12-18 PROCEDURE — 82306 VITAMIN D 25 HYDROXY: CPT

## 2023-12-18 PROCEDURE — 36415 COLL VENOUS BLD VENIPUNCTURE: CPT

## 2023-12-18 NOTE — PROGRESS NOTES
Assessment/Plan:  Problem List Items Addressed This Visit        Digestive    GERD (gastroesophageal reflux disease)     Management per GI.  On PPI - previously stable s Rx prior to sleeve gastrectomy.  Watch GERD diet.              Other    Vitamin D deficiency     Improved.  Recommend lifestyle modifications.         Other hyperlipidemia     Stable s statin.  Recommend lifestyle modifications.    The 10-year ASCVD risk score (Jordan QURESHI, et al., 2019) is: 1.2%    Values used to calculate the score:      Age: 53 years      Sex: Female      Is Non- : No      Diabetic: No      Tobacco smoker: No      Systolic Blood Pressure: 114 mmHg      Is BP treated: No      HDL Cholesterol: 57 mg/dL      Total Cholesterol: 223 mg/dL           Class 2 severe obesity with serious comorbidity and body mass index (BMI) of 38.0 to 38.9 in adult  - Primary     Improved.  Recommend lifestyle modifications.  Management per weight management - s/p sleeve gastrectomy.  Finish Lovenox therapy.                   Return if symptoms worsen or fail to improve.      Future Appointments   Date Time Provider Department Center   12/20/2023  2:00 PM Jim Hogan MD [facility]   12/20/2023  3:00 PM Awa Jiménez RD [facility]   1/17/2024  2:30 PM Candice Greenwood PA-C  [facility]   1/18/2024  1:00 PM [address]   2/29/2024  9:20 AM Angelica Mcpherson DO  [facility]   3/1/2024  9:00 AM Kristina Zuleta PA-C [facility]        Subjective:     Tessie is a 53 y.o. female who presents today for a hospital follow-up visit.      [facility] Call     Date and time call was made  12/14/2023  3:00 PM    Hospital care reviewed  Records reviewed    Patient was hospitialized at  [facility]    Date of Admission  12/12/23    Date of discharge  12/13/23    Diagnosis  morbid obesity; gastrectomy sleeve    Disposition  Home    Were the patients  medications reviewed and updated  Yes    Current Symptoms  Neausea    Neausea severity  Mild      TCM Call     Post hospital issues  None    Should patient be enrolled in anticoag monitoring?  No    Scheduled for follow up?  Yes    Patients specialists  Other (comment)    Other specialists names  bariatrics    Did you obtain your prescribed medications  Yes    Do you need help managing your prescriptions or medications  No    Is transportation to your appointment needed  No    I have advised the patient to call PCP with any new or worsening symptoms  Juanita THOMPSON LPN            HPI:  Chief Complaint   Patient presents with   • Transition of Care Management     d/c 12/13/23 gastrectomy sleeve. Pt is doing well. No problems or concerns at this time.    • HM     Declines flu and covid vaccines. Mammo and pap are scheduled.      -- Above per clinical staff and reviewed. --    HPI        Today:      F/U Hospitalization [facility] 12/12/23 - 12/13/24     Morbid Obesity  - s/p Sleeve Gastrectomy per Dr. Hogan 12/12/23 - next appt 12/20/23.  On Lovenox 0.4mL QD x 13 days through 12/26/23 (not taking since 12/16/23 - AMA and will resume) Oxycodone IR 5mg q4 hours PRN (#10) - never used.  Not taking Tylenol or OTC pain meds and pain is controlled.        2.  GERD - On Omeprazole 20mg QD.      3.  Hyperlipidemia -     Since being home from the hospital, she states her pain is controlled and she has been active moving around.  No longer using Zofran PRN nausea.  Today is the first day of pureed foods and tolerated applesauce well.  She is trying to push fluids, but only drank 20 of recommended 40 oz yesterday and noticed white coating on tongue yesterday.  Denies odynophagia.      The following portions of the patient's history were reviewed and updated as appropriate: allergies, current medications, past family history, past medical history, past social history, past surgical history and problem list.      Review of  "Systems   Constitutional:  Positive for appetite change (Decreased). Negative for chills, diaphoresis, fatigue and fever.   Respiratory:  Negative for cough, chest tightness, shortness of breath and wheezing.    Cardiovascular:  Negative for chest pain and leg swelling.   Gastrointestinal:  Positive for abdominal pain (Epiagastric, sharp pain, intermittent) and diarrhea (x 0 today, x 2-3 yesterday). Negative for blood in stool, nausea and vomiting.   Genitourinary:  Negative for dysuria.        Current Outpatient Medications   Medication Sig Dispense Refill   • ketoconazole (NIZORAL) 2 % shampoo Shampoo twice a week for 8 weeks, then as needed.  Leave on for 5 minutes before rinsing.  Rx per Derm. 120 mL 0   • Multiple Vitamin (multivitamin) capsule Take 1 capsule by mouth daily     • omeprazole (PriLOSEC) 20 mg delayed release capsule Take 1 capsule (20 mg total) by mouth daily 90 capsule 1   • acetaminophen (TYLENOL) 160 mg/5 mL suspension Take 30.4 mL (975 mg total) by mouth every 8 (eight) hours for 7 days (Patient not taking: Reported on 12/19/2023) 638.4 mL 0   • enoxaparin (Lovenox) 40 mg/0.4 mL Inject 0.4 mL (40 mg total) under the skin Daily at 2am for 13 days Do not start before December 13, 2023. (Patient not taking: Reported on 12/19/2023) 5.2 mL 0   • ondansetron (ZOFRAN) 4 mg tablet Take 1 tablet (4 mg total) by mouth every 8 (eight) hours as needed for nausea or vomiting (Patient not taking: Reported on 12/19/2023) 20 tablet 0   • oxyCODONE (Roxicodone) 5 immediate release tablet Take 1 tablet (5 mg total) by mouth every 4 (four) hours as needed for moderate pain Max Daily Amount: 30 mg (Patient not taking: Reported on 12/19/2023) 10 tablet 0     No current facility-administered medications for this visit.       Objective:  /70   Pulse 88   Temp 98.2 °F (36.8 °C)   Resp 16   Ht 5' 4.5\" (1.638 m)   Wt 104 kg (228 lb 12.8 oz)   SpO2 98%   BMI 38.67 kg/m²    Wt Readings from Last 3 " Encounters:   12/19/23 104 kg (228 lb 12.8 oz)   12/12/23 109 kg (241 lb)   11/30/23 113 kg (248 lb 8 oz)      BP Readings from Last 3 Encounters:   12/19/23 114/70   12/13/23 134/67   11/30/23 118/72          Physical Exam  Vitals and nursing note reviewed.   Constitutional:       General: She is not in acute distress.     Appearance: Normal appearance. She is well-developed. She is obese.   HENT:      Head: Normocephalic and atraumatic.      Nose:      Right Sinus: No maxillary sinus tenderness or frontal sinus tenderness.      Left Sinus: No maxillary sinus tenderness or frontal sinus tenderness.      Mouth/Throat:      Mouth: Mucous membranes are moist.      Pharynx: Uvula midline.      Tonsils: No tonsillar exudate.      Comments: Tongue c white coating - unable to remove c tongue depressor    Eyes:      Conjunctiva/sclera: Conjunctivae normal.   Cardiovascular:      Rate and Rhythm: Normal rate and regular rhythm.      Pulses: Normal pulses.      Heart sounds: Normal heart sounds.   Pulmonary:      Effort: Pulmonary effort is normal.      Breath sounds: Normal breath sounds.   Abdominal:      General: A surgical scar is present. Bowel sounds are normal. There is no distension.      Palpations: Abdomen is soft. There is no mass.      Tenderness: There is no abdominal tenderness. There is no guarding or rebound.      Comments: Trochar incisions intact with surgical glue.  No D/C.   Musculoskeletal:         General: No swelling or tenderness.      Cervical back: Neck supple.      Right lower leg: No edema.      Left lower leg: No edema.   Lymphadenopathy:      Cervical: No cervical adenopathy.   Skin:     Findings: No rash.   Neurological:      General: No focal deficit present.      Mental Status: She is alert and oriented to person, place, and time.   Psychiatric:         Mood and Affect: Mood normal.         Lab Results:      Lab Results   Component Value Date    WBC 7.30 12/13/2023    HGB 11.3 (L) 12/13/2023  "   HCT 34.2 (L) 12/13/2023     12/13/2023    TRIG 120 10/06/2023    HDL 57 08/30/2023    LDLDIRECT 119 (H) 08/30/2023    ALT 21 10/06/2023    AST 17 10/06/2023    K 3.8 12/13/2023     12/13/2023    CREATININE 0.57 (L) 12/13/2023    BUN 8 12/13/2023    CO2 24 12/13/2023    GLUF 90 08/30/2023    HGBA1C 5.7 (H) 08/30/2023     Lab Results   Component Value Date    URICACID 5.9 06/04/2021     Invalid input(s): \"BASENAME\" Vitamin D    No results found.     POCT Labs              "

## 2023-12-19 ENCOUNTER — OFFICE VISIT (OUTPATIENT)
Dept: FAMILY MEDICINE CLINIC | Facility: CLINIC | Age: 53
End: 2023-12-19
Payer: COMMERCIAL

## 2023-12-19 VITALS
TEMPERATURE: 98.2 F | BODY MASS INDEX: 38.12 KG/M2 | OXYGEN SATURATION: 98 % | SYSTOLIC BLOOD PRESSURE: 114 MMHG | WEIGHT: 228.8 LBS | RESPIRATION RATE: 16 BRPM | DIASTOLIC BLOOD PRESSURE: 70 MMHG | HEART RATE: 88 BPM | HEIGHT: 65 IN

## 2023-12-19 DIAGNOSIS — K21.9 GASTROESOPHAGEAL REFLUX DISEASE, UNSPECIFIED WHETHER ESOPHAGITIS PRESENT: ICD-10-CM

## 2023-12-19 DIAGNOSIS — E66.01 CLASS 2 SEVERE OBESITY WITH SERIOUS COMORBIDITY AND BODY MASS INDEX (BMI) OF 38.0 TO 38.9 IN ADULT, UNSPECIFIED OBESITY TYPE: Primary | ICD-10-CM

## 2023-12-19 DIAGNOSIS — E55.9 VITAMIN D DEFICIENCY: ICD-10-CM

## 2023-12-19 DIAGNOSIS — E78.49 OTHER HYPERLIPIDEMIA: ICD-10-CM

## 2023-12-19 PROCEDURE — 99495 TRANSJ CARE MGMT MOD F2F 14D: CPT | Performed by: FAMILY MEDICINE

## 2023-12-19 NOTE — RESULT ENCOUNTER NOTE
Stable labs - will review with patient at upcoming appointment.    Vitamin D - Recommend start multivitamin and over-the-counter vitamin D3 1000 - 3000 International Units daily.    Message sent to patient via [employer] patient portal.

## 2023-12-19 NOTE — PROGRESS NOTES
Weight Management Nutrition Class     Diagnosis: Morbid Obesity    Bariatric Surgeon: Dr. Jim Hogan    Surgery: Vertical Sleeve Gastrectomy    Class: first post op note    Topics discussed today include:     fluid goals post op, protein goals post op, constipation, chew food well, exercise, avoidance of alcohol, PPI use, diet progression, hypoglycemia, dumping syndrome, protein supplems, vitamin/mineral supplements, calcium supplements, and iron supplements    Patient was able to verbalize basic diet (protein, fluid, vitamin and mineral) recommendations and possible nutrition-related complications. Yes

## 2023-12-19 NOTE — ASSESSMENT & PLAN NOTE
Management per GI.  On PPI - previously stable s Rx prior to sleeve gastrectomy.  Watch GERD diet.

## 2023-12-19 NOTE — ASSESSMENT & PLAN NOTE
Improved.  Recommend lifestyle modifications.  Management per weight management - s/p sleeve gastrectomy.  Finish Lovenox therapy.

## 2023-12-19 NOTE — ASSESSMENT & PLAN NOTE
Stable s statin.  Recommend lifestyle modifications.    The 10-year ASCVD risk score (Jordan QURESHI, et al., 2019) is: 1.2%    Values used to calculate the score:      Age: 53 years      Sex: Female      Is Non- : No      Diabetic: No      Tobacco smoker: No      Systolic Blood Pressure: 114 mmHg      Is BP treated: No      HDL Cholesterol: 57 mg/dL      Total Cholesterol: 223 mg/dL

## 2023-12-20 ENCOUNTER — OFFICE VISIT (OUTPATIENT)
Dept: BARIATRICS | Facility: CLINIC | Age: 53
End: 2023-12-20

## 2023-12-20 VITALS
HEART RATE: 78 BPM | TEMPERATURE: 99 F | DIASTOLIC BLOOD PRESSURE: 76 MMHG | BODY MASS INDEX: 37.74 KG/M2 | HEIGHT: 65 IN | WEIGHT: 226.5 LBS | SYSTOLIC BLOOD PRESSURE: 120 MMHG

## 2023-12-20 DIAGNOSIS — Z98.84 BARIATRIC SURGERY STATUS: ICD-10-CM

## 2023-12-20 DIAGNOSIS — E66.01 CLASS 3 SEVERE OBESITY WITH SERIOUS COMORBIDITY AND BODY MASS INDEX (BMI) OF 40.0 TO 44.9 IN ADULT, UNSPECIFIED OBESITY TYPE (HCC): Primary | ICD-10-CM

## 2023-12-20 DIAGNOSIS — E66.9 OBESITY, CLASS II, BMI 35-39.9: Primary | ICD-10-CM

## 2023-12-20 DIAGNOSIS — Z09 POSTOP CHECK: ICD-10-CM

## 2023-12-20 PROCEDURE — RECHECK: Performed by: DIETITIAN, REGISTERED

## 2023-12-20 PROCEDURE — 99024 POSTOP FOLLOW-UP VISIT: CPT | Performed by: SURGERY

## 2023-12-20 RX ORDER — MELATONIN
1000 2 TIMES DAILY
COMMUNITY

## 2023-12-20 NOTE — PROGRESS NOTES
POST OP UP VISIT - BARIATRIC SURGERY  Tessie Cason 53 y.o. female MRN: [id number]  Unit/Bed#:  Encounter: [id number]      HPI:  Tessie Cason is a 53 y.o. female status post robotic sleeve gastrectomy performed by Dr. Hogan on  returning to office for first post op visit since surgery.    Tolerating diet.  Denies nausea and vomiting.  Taking multivitamins and PPI daily.  Administering lovenox injections.     Review of Systems   All other systems reviewed and are negative.      Historical Information   Past Medical History:   Diagnosis Date    Arthritis     Class 2 severe obesity with serious comorbidity and body mass index (BMI) of 39.0 to 39.9 in adult      COVID-19 2020    Diabetes mellitus (HCC)     gestational    Diverticulosis 2022    Generalized anxiety disorder 2015    GERD (gastroesophageal reflux disease)     History of gestational diabetes ,     IFG (impaired fasting glucose)     Obesity     gastric sleeve today 2023    Osteoarthritis 2015    Osteoarthritis (Ankles & Knees)    Other hyperlipidemia 2021    Seborrheic dermatitis 2023    Vitamin D deficiency 2019     Past Surgical History:   Procedure Laterality Date    ARTHROSCOPY ANKLE Right 2016    OA     SECTION   &     CHOLECYSTECTOMY  2021    COLONOSCOPY      EGD  2023    Gastritis    EGD      SD LAPAROSCOPY SURG CHOLECYSTECTOMY N/A 2021    Procedure: LAPAROSCOPIC POSSIBLE OPEN CHOLECYSTECTOMY;  Surgeon: Dario Bowens MD;  Location:  MAIN OR;  Service: General    SD LAPS GSTRC RSTRICTIV PX LONGITUDINAL GASTRECTOMY N/A 2023    Procedure: GASTRECTOMY  SLEEVE W/ ROBOTICS & INTRAOPERATIVE EGD;  Surgeon: Jim Hogan MD;  Location: [facility];  Service: Bariatrics    TONSILLECTOMY       Social History   Social History     Substance and Sexual Activity   Alcohol Use Not Currently    Alcohol/week: 1.0 standard drink of alcohol    Types: 1 Glasses of wine  per week     Social History     Substance and Sexual Activity   Drug Use Not Currently    Types: Marijuana    Comment: Past use of Marijuana-teen years     Social History     Tobacco Use   Smoking Status Former    Current packs/day: 0.00    Average packs/day: 0.5 packs/day for 24.0 years (12.0 ttl pk-yrs)    Types: Cigarettes    Start date: 1986    Quit date: 2010    Years since quittin.9   Smokeless Tobacco Never     Family History: non-contributory    Meds/Allergies   all medications and allergies reviewed  No Known Allergies    Objective       Current Vitals:          Invasive Devices       Peripheral Intravenous Line  Duration             Peripheral IV 23 Dorsal (posterior);Right Forearm 8 days                    Physical Exam  HENT:      Head: Normocephalic.   Cardiovascular:      Rate and Rhythm: Normal rate.      Pulses: Normal pulses.   Pulmonary:      Effort: Pulmonary effort is normal.   Abdominal:      Palpations: Abdomen is soft.      Comments: Incisions clean dry and intact   Neurological:      General: No focal deficit present.      Mental Status: She is alert and oriented to person, place, and time.             Assessment/PLAN:    53 y.o. female status post robotic sleeve gastrectomy done on  by Dr. Hogan, doing well post op. No major issues and healing well.     The pathology report was reviewed with the patient and the results were within normal limits.     Pathology, and Other Studies: I have personally reviewed pertinent reports.    Lab Results   Component Value Date    FINALDX  2023     A. Stomach, sleeve gastrectomy  -Segment of stomach with no significant histopathologic change.           Increase physical activity slowly as tolerated and instructed.  Advance diet as instructed by our dietitians today and as indicated in the binder.  Continue Lovenox prophylaxis until completed.   Continue PPI    Follow up in one month as scheduled.        Nicholas Vera MD  Bariatric  Surgery   12/20/2023  2:10 PM      .

## 2024-01-08 ENCOUNTER — TELEPHONE (OUTPATIENT)
Dept: BARIATRICS | Facility: CLINIC | Age: 54
End: 2024-01-08

## 2024-01-08 NOTE — TELEPHONE ENCOUNTER
----- Message from Dona Byrnes sent at 1/8/2024  9:54 AM EST -----  Regarding: Surgery question  Patient called and left a message that she feels like a stitch is coming through and was questing what to do. Surgery was on 12/12    Dona

## 2024-01-08 NOTE — TELEPHONE ENCOUNTER
Reached out to Pt re: message. Left VM requesting Pt contact office at her convenience and send picture of incision causing concern via [employer].

## 2024-01-18 ENCOUNTER — HOSPITAL ENCOUNTER (OUTPATIENT)
Dept: MAMMOGRAPHY | Facility: HOSPITAL | Age: 54
Discharge: HOME | End: 2024-01-18
Payer: COMMERCIAL

## 2024-01-18 VITALS — WEIGHT: 216 LBS | HEIGHT: 65 IN | BODY MASS INDEX: 35.99 KG/M2

## 2024-01-18 DIAGNOSIS — Z12.31 ENCOUNTER FOR SCREENING MAMMOGRAM FOR BREAST CANCER: ICD-10-CM

## 2024-01-18 PROCEDURE — 77063 BREAST TOMOSYNTHESIS BI: CPT

## 2024-01-18 PROCEDURE — 77067 SCR MAMMO BI INCL CAD: CPT

## 2024-01-19 NOTE — RESULT ENCOUNTER NOTE
Normal mammogram.  Advised monthly self-breast exams.  Repeat in 1 year.    Message sent to patient via [employer] patient portal.

## 2024-01-31 ENCOUNTER — CLINICAL SUPPORT (OUTPATIENT)
Dept: BARIATRICS | Facility: CLINIC | Age: 54
End: 2024-01-31

## 2024-01-31 DIAGNOSIS — Z98.84 BARIATRIC SURGERY STATUS: Primary | ICD-10-CM

## 2024-01-31 PROCEDURE — RECHECK: Performed by: DIETITIAN, REGISTERED

## 2024-01-31 NOTE — PROGRESS NOTES
Patient's name and  were verified during visit. Pt present in a state that I hold active licensure.   Provider explained that [employer] is a HIPPA compliant platform and to further protect their confidentiality the provider was alone and the office door was closed. Patient consented to the virtual video visit Patient consented to the [employer] visit.  This visit is free.     Patient attended virtual 5 week post op class via [employer].  Reviewed diet progression, vitamin and mineral recommendations, 30/60 rules, volume of foods, protein supplements, hydration needs, antacid  guidelines, recommendation to f/u with pcp concerning med adjustments, exercise guidelines, hair shedding, contraception guidelines, constipation prevention and treatment, alcohol avoidance and recommendations of when to call the office. Patient was also encouraged to fill  out form for program .  Time was left for discussion and to answer questions.

## 2024-02-14 ENCOUNTER — OFFICE VISIT (OUTPATIENT)
Dept: URGENT CARE | Facility: CLINIC | Age: 54
End: 2024-02-14
Payer: COMMERCIAL

## 2024-02-14 ENCOUNTER — APPOINTMENT (OUTPATIENT)
Dept: RADIOLOGY | Facility: CLINIC | Age: 54
End: 2024-02-14
Payer: COMMERCIAL

## 2024-02-14 VITALS
DIASTOLIC BLOOD PRESSURE: 71 MMHG | TEMPERATURE: 98 F | SYSTOLIC BLOOD PRESSURE: 156 MMHG | HEART RATE: 75 BPM | OXYGEN SATURATION: 98 % | RESPIRATION RATE: 16 BRPM

## 2024-02-14 DIAGNOSIS — S46.912A LEFT SHOULDER STRAIN, INITIAL ENCOUNTER: ICD-10-CM

## 2024-02-14 DIAGNOSIS — S46.912A LEFT SHOULDER STRAIN, INITIAL ENCOUNTER: Primary | ICD-10-CM

## 2024-02-14 PROCEDURE — 99213 OFFICE O/P EST LOW 20 MIN: CPT | Performed by: FAMILY MEDICINE

## 2024-02-14 PROCEDURE — 73030 X-RAY EXAM OF SHOULDER: CPT

## 2024-02-15 NOTE — PROGRESS NOTES
[facility]        NAME: Tessie Cason is a 53 y.o. female  : 1970    MRN: [id number]  DATE: 2024  TIME: 7:44 PM    Assessment and Plan   Left shoulder strain, initial encounter [S46.912A]  1. Left shoulder strain, initial encounter  XR shoulder 2+ vw left            Patient Instructions     Discussed gentle ROM exercises at home. Tylenol for pain. Unable to take NSAIDs. Follow up with ortho in 1 week if no improvement. Proceed to  ER if symptoms worsen.    Chief Complaint     Chief Complaint   Patient presents with   • Shoulder Pain     Pt reports snowblowing yesterday and fell and landed on outstretched left shoulder. States today c/o pain and numbness/tingling when holding up.          History of Present Illness       Shoulder Pain   The pain is present in the left shoulder. This is a new problem. The current episode started yesterday. There has been a history of trauma. The problem occurs constantly. The problem has been waxing and waning. The quality of the pain is described as aching and sharp. The pain is moderate. Associated symptoms include a limited range of motion, numbness and tingling. Pertinent negatives include no fever. The symptoms are aggravated by activity. She has tried acetaminophen for the symptoms. The treatment provided mild relief.       Review of Systems   Review of Systems   Constitutional:  Negative for chills, fatigue and fever.   HENT:  Negative for postnasal drip and sore throat.    Respiratory:  Negative for cough and shortness of breath.    Cardiovascular:  Negative for chest pain and palpitations.   Gastrointestinal:  Negative for abdominal pain, nausea and vomiting.   Genitourinary:  Negative for dysuria.   Musculoskeletal:  Positive for arthralgias. Negative for gait problem and joint swelling.   Skin:  Negative for rash.   Neurological:  Positive for tingling and numbness. Negative for dizziness, syncope, light-headedness and headaches.    Psychiatric/Behavioral:  Negative for agitation and confusion.    All other systems reviewed and are negative.        Current Medications       Current Outpatient Medications:   •  cholecalciferol (VITAMIN D3) 1,000 units tablet, Take 1,000 Units by mouth 2 (two) times a day, Disp: , Rfl:   •  enoxaparin (Lovenox) 40 mg/0.4 mL, Inject 0.4 mL (40 mg total) under the skin Daily at 2am for 13 days Do not start before December 13, 2023., Disp: 5.2 mL, Rfl: 0  •  ketoconazole (NIZORAL) 2 % shampoo, Shampoo twice a week for 8 weeks, then as needed.  Leave on for 5 minutes before rinsing.  Rx per Derm., Disp: 120 mL, Rfl: 0  •  Multiple Vitamin (multivitamin) capsule, Take 1 capsule by mouth daily, Disp: , Rfl:   •  omeprazole (PriLOSEC) 20 mg delayed release capsule, Take 1 capsule (20 mg total) by mouth daily, Disp: 90 capsule, Rfl: 1  •  ondansetron (ZOFRAN) 4 mg tablet, Take 1 tablet (4 mg total) by mouth every 8 (eight) hours as needed for nausea or vomiting (Patient not taking: Reported on 12/19/2023), Disp: 20 tablet, Rfl: 0  •  oxyCODONE (Roxicodone) 5 immediate release tablet, Take 1 tablet (5 mg total) by mouth every 4 (four) hours as needed for moderate pain Max Daily Amount: 30 mg (Patient not taking: Reported on 12/19/2023), Disp: 10 tablet, Rfl: 0    Current Allergies     Allergies as of 02/14/2024 - Reviewed 02/14/2024   Allergen Reaction Noted   • Nsaids Other (See Comments) 02/14/2024            The following portions of the patient's history were reviewed and updated as appropriate: allergies, current medications, past family history, past medical history, past social history, past surgical history and problem list.     Past Medical History:   Diagnosis Date   • Arthritis    • Class 2 severe obesity with serious comorbidity and body mass index (BMI) of 39.0 to 39.9 in adult     • COVID-19 11/18/2020   • Diabetes mellitus (HCC)     gestational   • Diverticulosis 11/2022   • Generalized anxiety disorder  2015   • GERD (gastroesophageal reflux disease)    • History of gestational diabetes ,    • IFG (impaired fasting glucose)    • Obesity     gastric sleeve today 2023   • Osteoarthritis 2015    Osteoarthritis (Ankles & Knees)   • Other hyperlipidemia 2021   • Seborrheic dermatitis 2023   • Vitamin D deficiency 2019       Past Surgical History:   Procedure Laterality Date   • ARTHROSCOPY ANKLE Right 2016    OA   •  SECTION   &    • CHOLECYSTECTOMY  2021   • COLONOSCOPY     • EGD  2023    Gastritis   • EGD     • AK LAPAROSCOPY SURG CHOLECYSTECTOMY N/A 2021    Procedure: LAPAROSCOPIC POSSIBLE OPEN CHOLECYSTECTOMY;  Surgeon: Dario Bowens MD;  Location: [facility];  Service: General   • AK LAPS GSTRC RSTRICTIV PX LONGITUDINAL GASTRECTOMY N/A 2023    Procedure: GASTRECTOMY  SLEEVE W/ ROBOTICS & INTRAOPERATIVE EGD;  Surgeon: Jim Hogan MD;  Location: [facility];  Service: Bariatrics   • TONSILLECTOMY         Family History   Problem Relation Age of Onset   • Heart defect Mother         R to L Shunt   • Muscular dystrophy Mother 31   • Heart disease Mother    • Diabetes Father 40        Type Unknown   • Heart attack Father 31   • Coronary artery disease Father 31   • Heart disease Father    • Diabetes Maternal Grandmother    • Kidney disease Maternal Grandmother    • No Known Problems Maternal Grandfather    • Diabetes Paternal Grandmother    • Heart disease Paternal Grandmother    • Diabetes Paternal Grandfather    • Heart disease Paternal Grandfather    • No Known Problems Brother    • No Known Problems Son    • No Known Problems Son    • No Known Problems Maternal Aunt    • No Known Problems Paternal Aunt    • No Known Problems Paternal Aunt    • No Known Problems Paternal Aunt    • No Known Problems Paternal Aunt          Medications have been verified.        Objective   /71   Pulse 75   Temp 98 °F (36.7 °C)   Resp 16   LMP  10/30/2023 (Exact Date)   SpO2 98%   Patient's last menstrual period was 10/30/2023 (exact date).       Physical Exam     Physical Exam  Vitals reviewed.   Constitutional:       General: She is not in acute distress.     Appearance: Normal appearance. She is not ill-appearing.   HENT:      Head: Normocephalic and atraumatic.   Eyes:      Extraocular Movements: Extraocular movements intact.      Conjunctiva/sclera: Conjunctivae normal.   Musculoskeletal:      Left shoulder: Tenderness (biceps) present. No swelling. Decreased range of motion. Normal strength.      Cervical back: Normal range of motion.      Comments: Negative Empty Can, Resisted IR and ER, and Obriens   Skin:     General: Skin is warm.   Neurological:      General: No focal deficit present.      Mental Status: She is alert.   Psychiatric:         Mood and Affect: Mood normal.         Behavior: Behavior normal.         Judgment: Judgment normal.           LEFT SHOULDER XRAY: Negative for fracture or acute bony abnormalities.

## 2024-02-17 ENCOUNTER — LAB (OUTPATIENT)
Dept: LAB | Facility: HOSPITAL | Age: 54
End: 2024-02-17
Payer: COMMERCIAL

## 2024-02-17 DIAGNOSIS — F41.1 GENERALIZED ANXIETY DISORDER: ICD-10-CM

## 2024-02-17 DIAGNOSIS — E55.9 VITAMIN D DEFICIENCY: ICD-10-CM

## 2024-02-17 DIAGNOSIS — E78.49 OTHER HYPERLIPIDEMIA: ICD-10-CM

## 2024-02-17 DIAGNOSIS — E66.01 CLASS 3 SEVERE OBESITY WITH SERIOUS COMORBIDITY AND BODY MASS INDEX (BMI) OF 40.0 TO 44.9 IN ADULT, UNSPECIFIED OBESITY TYPE (HCC): ICD-10-CM

## 2024-02-17 DIAGNOSIS — R73.01 IFG (IMPAIRED FASTING GLUCOSE): ICD-10-CM

## 2024-02-17 LAB
25(OH)D3 SERPL-MCNC: 39.7 NG/ML (ref 30–100)
ALBUMIN SERPL BCP-MCNC: 4.2 G/DL (ref 3.5–5)
ALP SERPL-CCNC: 67 U/L (ref 34–104)
ALT SERPL W P-5'-P-CCNC: 12 U/L (ref 7–52)
ANION GAP SERPL CALCULATED.3IONS-SCNC: 11 MMOL/L
AST SERPL W P-5'-P-CCNC: 15 U/L (ref 13–39)
BILIRUB SERPL-MCNC: 0.56 MG/DL (ref 0.2–1)
BUN SERPL-MCNC: 9 MG/DL (ref 5–25)
CALCIUM SERPL-MCNC: 9.7 MG/DL (ref 8.4–10.2)
CHLORIDE SERPL-SCNC: 103 MMOL/L (ref 96–108)
CHOLEST SERPL-MCNC: 188 MG/DL
CO2 SERPL-SCNC: 27 MMOL/L (ref 21–32)
CREAT SERPL-MCNC: 0.65 MG/DL (ref 0.6–1.3)
EST. AVERAGE GLUCOSE BLD GHB EST-MCNC: 105 MG/DL
GFR SERPL CREATININE-BSD FRML MDRD: 103 ML/MIN/1.73SQ M
GLUCOSE P FAST SERPL-MCNC: 86 MG/DL (ref 65–99)
HBA1C MFR BLD: 5.3 %
HDLC SERPL-MCNC: 40 MG/DL
LDLC SERPL CALC-MCNC: 124 MG/DL (ref 0–100)
LDLC SERPL DIRECT ASSAY-MCNC: 124 MG/DL (ref 0–100)
NONHDLC SERPL-MCNC: 148 MG/DL
POTASSIUM SERPL-SCNC: 4.2 MMOL/L (ref 3.5–5.3)
PROT SERPL-MCNC: 7.5 G/DL (ref 6.4–8.4)
SODIUM SERPL-SCNC: 141 MMOL/L (ref 135–147)
TRIGL SERPL-MCNC: 121 MG/DL
TSH SERPL DL<=0.05 MIU/L-ACNC: 2.14 UIU/ML (ref 0.45–4.5)

## 2024-02-17 PROCEDURE — 36415 COLL VENOUS BLD VENIPUNCTURE: CPT

## 2024-02-17 PROCEDURE — 84443 ASSAY THYROID STIM HORMONE: CPT

## 2024-02-17 PROCEDURE — 80061 LIPID PANEL: CPT

## 2024-02-17 PROCEDURE — 82306 VITAMIN D 25 HYDROXY: CPT

## 2024-02-17 PROCEDURE — 83036 HEMOGLOBIN GLYCOSYLATED A1C: CPT

## 2024-02-17 PROCEDURE — 83721 ASSAY OF BLOOD LIPOPROTEIN: CPT

## 2024-02-17 PROCEDURE — 80053 COMPREHEN METABOLIC PANEL: CPT

## 2024-02-20 NOTE — RESULT ENCOUNTER NOTE
Stable labs - will review with patient at upcoming appointment.    Hyperlipidemia - Recommend lifestyle modifications.    The 10-year ASCVD risk score (Jordan QURESHI, et al., 2019) is: 2.7%    Values used to calculate the score:      Age: 53 years      Sex: Female      Is Non- : No      Diabetic: No      Tobacco smoker: No      Systolic Blood Pressure: 156 mmHg      Is BP treated: No      HDL Cholesterol: 40 mg/dL      Total Cholesterol: 188 mg/dL

## 2024-02-29 ENCOUNTER — OFFICE VISIT (OUTPATIENT)
Dept: FAMILY MEDICINE CLINIC | Facility: CLINIC | Age: 54
End: 2024-02-29
Payer: COMMERCIAL

## 2024-02-29 VITALS
OXYGEN SATURATION: 97 % | HEART RATE: 69 BPM | BODY MASS INDEX: 34.22 KG/M2 | WEIGHT: 205.4 LBS | HEIGHT: 65 IN | DIASTOLIC BLOOD PRESSURE: 64 MMHG | SYSTOLIC BLOOD PRESSURE: 114 MMHG | RESPIRATION RATE: 16 BRPM | TEMPERATURE: 98.3 F

## 2024-02-29 DIAGNOSIS — M15.9 OSTEOARTHRITIS OF MULTIPLE JOINTS, UNSPECIFIED OSTEOARTHRITIS TYPE: ICD-10-CM

## 2024-02-29 DIAGNOSIS — F41.1 GENERALIZED ANXIETY DISORDER: ICD-10-CM

## 2024-02-29 DIAGNOSIS — R73.01 IFG (IMPAIRED FASTING GLUCOSE): ICD-10-CM

## 2024-02-29 DIAGNOSIS — R30.0 DYSURIA: ICD-10-CM

## 2024-02-29 DIAGNOSIS — Z13.1 SCREENING FOR DIABETES MELLITUS: ICD-10-CM

## 2024-02-29 DIAGNOSIS — R31.29 MICROSCOPIC HEMATURIA: ICD-10-CM

## 2024-02-29 DIAGNOSIS — M25.512 ACUTE SHOULDER PAIN DUE TO TRAUMA, LEFT: Primary | ICD-10-CM

## 2024-02-29 DIAGNOSIS — E66.9 CLASS 1 OBESITY WITH SERIOUS COMORBIDITY AND BODY MASS INDEX (BMI) OF 34.0 TO 34.9 IN ADULT, UNSPECIFIED OBESITY TYPE: ICD-10-CM

## 2024-02-29 DIAGNOSIS — L21.9 SEBORRHEIC DERMATITIS: ICD-10-CM

## 2024-02-29 DIAGNOSIS — E78.49 OTHER HYPERLIPIDEMIA: ICD-10-CM

## 2024-02-29 DIAGNOSIS — G89.11 ACUTE SHOULDER PAIN DUE TO TRAUMA, LEFT: Primary | ICD-10-CM

## 2024-02-29 DIAGNOSIS — Z13.6 SCREENING FOR CARDIOVASCULAR CONDITION: ICD-10-CM

## 2024-02-29 DIAGNOSIS — K21.9 GASTROESOPHAGEAL REFLUX DISEASE, UNSPECIFIED WHETHER ESOPHAGITIS PRESENT: ICD-10-CM

## 2024-02-29 DIAGNOSIS — R76.8 POSITIVE ANA (ANTINUCLEAR ANTIBODY): ICD-10-CM

## 2024-02-29 DIAGNOSIS — E55.9 VITAMIN D DEFICIENCY: ICD-10-CM

## 2024-02-29 PROBLEM — S93.492A SPRAIN OF ANTERIOR TALOFIBULAR LIGAMENT OF LEFT ANKLE: Status: RESOLVED | Noted: 2022-06-28 | Resolved: 2024-02-29

## 2024-02-29 LAB
BACTERIA UR QL AUTO: ABNORMAL /HPF
BILIRUB UR QL STRIP: NEGATIVE
CLARITY UR: CLEAR
COLOR UR: YELLOW
GLUCOSE UR STRIP-MCNC: NEGATIVE MG/DL
HGB UR QL STRIP.AUTO: NEGATIVE
KETONES UR STRIP-MCNC: ABNORMAL MG/DL
LEUKOCYTE ESTERASE UR QL STRIP: NEGATIVE
MUCOUS THREADS UR QL AUTO: ABNORMAL
NITRITE UR QL STRIP: NEGATIVE
NON-SQ EPI CELLS URNS QL MICRO: ABNORMAL /HPF
PH UR STRIP.AUTO: 6 [PH]
PROT UR STRIP-MCNC: ABNORMAL MG/DL
RBC #/AREA URNS AUTO: ABNORMAL /HPF
SL AMB  POCT GLUCOSE, UA: ABNORMAL
SL AMB LEUKOCYTE ESTERASE,UA: ABNORMAL
SL AMB POCT BILIRUBIN,UA: ABNORMAL
SL AMB POCT BLOOD,UA: ABNORMAL
SL AMB POCT CLARITY,UA: CLEAR
SL AMB POCT COLOR,UA: YELLOW
SL AMB POCT KETONES,UA: ABNORMAL
SL AMB POCT NITRITE,UA: ABNORMAL
SL AMB POCT PH,UA: 6
SL AMB POCT SPECIFIC GRAVITY,UA: 1.02
SL AMB POCT URINE PROTEIN: ABNORMAL
SL AMB POCT UROBILINOGEN: ABNORMAL
SP GR UR STRIP.AUTO: 1.02 (ref 1–1.03)
UROBILINOGEN UR STRIP-ACNC: <2 MG/DL
WBC #/AREA URNS AUTO: ABNORMAL /HPF

## 2024-02-29 PROCEDURE — 87086 URINE CULTURE/COLONY COUNT: CPT | Performed by: FAMILY MEDICINE

## 2024-02-29 PROCEDURE — 81002 URINALYSIS NONAUTO W/O SCOPE: CPT | Performed by: FAMILY MEDICINE

## 2024-02-29 PROCEDURE — 81001 URINALYSIS AUTO W/SCOPE: CPT | Performed by: FAMILY MEDICINE

## 2024-02-29 PROCEDURE — 99214 OFFICE O/P EST MOD 30 MIN: CPT | Performed by: FAMILY MEDICINE

## 2024-02-29 NOTE — PROGRESS NOTES
Assessment/Plan:  Problem List Items Addressed This Visit          Digestive    GERD (gastroesophageal reflux disease)     Management per GI.  On PPI - previously stable s Rx prior to sleeve gastrectomy.  Watch GERD diet.           Relevant Orders    Magnesium       Endocrine    IFG (impaired fasting glucose)     Improved.  Patient coud not tolerate Metformin XR 500mg 2 pills  QD due to diarrhea.  Recommend lifestyle modifications.         Relevant Orders    Comprehensive metabolic panel    Hemoglobin A1C       Musculoskeletal and Integument    Osteoarthritis     Improved s/p Weight Loss.  Stable on Tylenol PRN.           Seborrheic dermatitis     Management per Derm.              Genitourinary    Microscopic hematuria     Resolved.  S/p U/A 6/4/21.              Other    Vitamin D deficiency     Stable.  Continue MVI and OTC Vitamin D 1000IU daily.           Relevant Orders    Comprehensive metabolic panel    Vitamin D 25 hydroxy    Generalized anxiety disorder     Stable without mood medications or counseling.           Relevant Orders    TSH, 3rd generation with Free T4 reflex    Other hyperlipidemia     Stable s statin.  Recommend lifestyle modifications.    The 10-year ASCVD risk score (Jordan QURESHI, et al., 2019) is: 1.5%    Values used to calculate the score:      Age: 53 years      Sex: Female      Is Non- : No      Diabetic: No      Tobacco smoker: No      Systolic Blood Pressure: 114 mmHg      Is BP treated: No      HDL Cholesterol: 40 mg/dL      Total Cholesterol: 188 mg/dL           Relevant Orders    CBC and differential    Comprehensive metabolic panel    Lipid panel    TSH, 3rd generation with Free T4 reflex    LDL cholesterol, direct    Class 1 obesity with serious comorbidity and body mass index (BMI) of 34.0 to 34.9 in adult     Improved.  Recommend lifestyle modifications.  Management per weight management - s/p sleeve gastrectomy.            Relevant Orders    Vitamin D 25  hydroxy    Positive HANH (antinuclear antibody)     Repeat HANH is negative.  Management per Rheum - F/U PRN.            Other Visit Diagnoses       Acute shoulder pain due to trauma, left    -  Primary    Relevant Orders    Ambulatory Referral to Orthopedic Surgery    Ambulatory Referral to Physical Therapy    XR acromioclavicular joints bilateral w wo weights    Suspect rotator cuff injury.     You may use Tylenol (Acetaminophen) up to 3,000mg daily (in 24 hours) as needed for pain or fever.      Dysuria        Relevant Orders    POCT urine dip (Completed)    Urine culture    Urinalysis with microscopic    Urine dip +Ketones - push fluids; Negative for infection.  Pending labs.        Screening for cardiovascular condition        Relevant Orders    CBC and differential    Comprehensive metabolic panel    Lipid panel    LDL cholesterol, direct    Screening for diabetes mellitus        Relevant Orders    Hemoglobin A1C             Return in about 6 months (around 8/29/2024) for Physical / 6mo - IFG, HL, Anxiety, Vit D Def, Obesity, Labs;PRN Shingrix #1; 2mo later - Shingrix #2.      Future Appointments   Date Time Provider Department Center   3/1/2024 10:00 AM CARIDAD Guzmán [facility]   3/20/2024  3:00 PM SMILEY Pimentel [facility]   8/30/2024 10:00 AM Angelica Mcpherson DO [facility]        Subjective:     Tessie is a 53 y.o. female who presents today for a follow-up on her chronic medical conditions.        HPI:  Chief Complaint   Patient presents with   • Follow-up     6mo - IFG, HL, Anxiety, Vit D Def, Obesity, Labs; Pt injured shoulder and needs referral.      • Shoulder Injury     Pt fell on the ice on 2/13 and over extended her shoulder. Went to [facility] for pain, xray was normal. Referral pending for ortho to f/u.    • HM     Declines Shingrix. Pt has appt with GYN for exam tomorrow, overdue for pap.      -- Above per clinical staff and reviewed.  --      HPI      Today:      Return in about 6 months (around 2024) for 6mo - IFG, HL, Anxiety, Vit D Def, Obesity, Labs; PRN Shingrix #1; 2mo later - Shingrix #2.     6mo OV    PTO c oskar Milleron     Morbid Obesity - Management per Weight Management Dr. Hogan - next appt 3/24.  s/p Vertical Sleeve Gastrectomy 23.  Trying to watch diet.  Eating protein, but not enough vegetables.  No regular exercise.  Active at work in [employer].      IFG / H/O GDM - Diet-controlled.  She stopped taking Metformin XR 500mg 2 pills daily due to diarrhea.         Hyperlipidemia - No statin previously.       GERD / Fatty Liver -  Management per GI Ms. Kristina Zuleta PA-C - next appt 3/24.  Stable, except for late night eating.  No longer uses OTC acid reducer PRN.  Improved s/p Laparoscopic cholecystectomy on 2021 per Dr. Bautista.  Sometimes diarrhea after eating.  Last colonoscopy 24, repeat in 10 years.        OA - Knees and Ankles - Management per Podiatry  - [facility].  Next appt PRN.  Stable on Voltaren gel and PO.  Advised to attend PT to prevent recurrent ankle sprain.       Anxiety -  Patient states mood is stable.  Good social supports.  No SI/HI/AH/VH.  No mood meds or counseling previously.             PHQ-2/9 Depression Screening    Little interest or pleasure in doing things: 0 - not at all  Feeling down, depressed, or hopeless: 0 - not at all  Trouble falling or staying asleep, or sleeping too much: 0 - not at all  Feeling tired or having little energy: 0 - not at all  Poor appetite or overeatin - not at all  Feeling bad about yourself - or that you are a failure or have let yourself or your family down: 0 - not at all  Trouble concentrating on things, such as reading the newspaper or watching television: 0 - not at all  Moving or speaking so slowly that other people could have noticed. Or the opposite - being so fidgety or restless that you have been moving around a lot more than  usual: 0 - not at all  Thoughts that you would be better off dead, or of hurting yourself in some way: 0 - not at all  PHQ-2 Score: 0  PHQ-2 Interpretation: Negative depression screen  PHQ-9 Score: 0  PHQ-9 Interpretation: No or Minimal depression       GRACIELA-7 Flowsheet Screening    Flowsheet Row Most Recent Value   Over the last 2 weeks, how often have you been bothered by any of the following problems?    Feeling nervous, anxious, or on edge 0   Not being able to stop or control worrying 0   Worrying too much about different things 0   Trouble relaxing 0   Being so restless that it is hard to sit still 0   Becoming easily annoyed or irritable 0   Feeling afraid as if something awful might happen 0   GRACIELA-7 Total Score 0             Vitamin D Deficiency - s/p Drisdol.  Taking MVI and OTC Vitamin D 2000IU daily.        +HANH / OA - Management per Rheum Dr. Toribio - Next appt PRN.  Advises Celebrex if ok per [facility] team.  Negative repeat HANH 6/4/21.  Using Tylenol PRN c benefit.       Seborrheic Dermatitis - Management per Derm Dr. Razo - Next appt?  On Ketoconazole and Ciclospirox.       Microscopic Hematuria - No Uro evaluation previously.  Stable U/A 6/4/21.    Fam Hx CAD - Management per Cardio Dr. Cerda - Next appt 10/24.           Reviewed:  Labs 2/17/24, GI 8/31/23, Weight Management 12/20/23, Derm 4/7/23, Rheum 11/9/23, Cardio 10/4/23, Podiatry 10/2/23     No Gyn.  Last saw Gyn years ago.  No abnormal Paps in the past.  Pending Gyn consult gemma Robertson PA-C 3/24.            The following portions of the patient's history were reviewed and updated as appropriate: allergies, current medications, past family history, past medical history, past social history, past surgical history and problem list.      Review of Systems   Constitutional:  Negative for appetite change, chills, diaphoresis, fatigue and fever.   Respiratory:  Negative for chest tightness and shortness of breath.    Cardiovascular:   "Negative for chest pain.   Gastrointestinal:  Positive for constipation (Intermittent, resolved s/p laxative). Negative for abdominal pain, blood in stool, diarrhea, nausea and vomiting.   Genitourinary:  Positive for dysuria (Intermittent). Negative for urgency.   Musculoskeletal:  Positive for arthralgias.        Current Outpatient Medications   Medication Sig Dispense Refill   • cholecalciferol (VITAMIN D3) 1,000 units tablet Take 1,000 Units by mouth 2 (two) times a day     • ketoconazole (NIZORAL) 2 % shampoo Shampoo twice a week for 8 weeks, then as needed.  Leave on for 5 minutes before rinsing.  Rx per Derm. 120 mL 0   • Multiple Vitamin (multivitamin) capsule Take 1 capsule by mouth daily     • omeprazole (PriLOSEC) 20 mg delayed release capsule Take 1 capsule (20 mg total) by mouth daily 90 capsule 1     No current facility-administered medications for this visit.       Objective:  /64   Pulse 69   Temp 98.3 °F (36.8 °C)   Resp 16   Ht 5' 4.5\" (1.638 m)   Wt 93.2 kg (205 lb 6.4 oz)   SpO2 97%   BMI 34.71 kg/m²    Wt Readings from Last 3 Encounters:   02/29/24 93.2 kg (205 lb 6.4 oz)   01/18/24 98 kg (216 lb)   12/20/23 103 kg (226 lb 8 oz)      BP Readings from Last 3 Encounters:   02/29/24 114/64   02/14/24 156/71   12/20/23 120/76          Physical Exam  Vitals and nursing note reviewed.   Constitutional:       Appearance: Normal appearance. She is well-developed. She is obese.   HENT:      Head: Normocephalic and atraumatic.   Eyes:      Conjunctiva/sclera: Conjunctivae normal.   Neck:      Thyroid: No thyromegaly.   Cardiovascular:      Rate and Rhythm: Normal rate and regular rhythm.      Pulses: Normal pulses.      Heart sounds: Normal heart sounds.   Pulmonary:      Effort: Pulmonary effort is normal.      Breath sounds: Normal breath sounds.   Abdominal:      General: Bowel sounds are normal. There is no distension.      Palpations: Abdomen is soft. There is no mass.      Tenderness: " "There is no abdominal tenderness. There is no right CVA tenderness, left CVA tenderness, guarding or rebound.   Musculoskeletal:         General: No swelling or tenderness.      Cervical back: Neck supple.      Right lower leg: No edema.      Left lower leg: No edema.      Comments: B/L Clavicles Stable.  Decreased AROM Left shoulder c lateral abduction above 45 degrees, and external rotation above 180 degrees.  N/V intact.  MS 5/5.   Lymphadenopathy:      Cervical: No cervical adenopathy.   Neurological:      General: No focal deficit present.      Mental Status: She is alert and oriented to person, place, and time. Mental status is at baseline.   Psychiatric:         Mood and Affect: Mood normal.         Behavior: Behavior normal.         Thought Content: Thought content normal.         Judgment: Judgment normal.         Lab Results:      Lab Results   Component Value Date    WBC 7.30 12/13/2023    HGB 11.3 (L) 12/13/2023    HCT 34.2 (L) 12/13/2023     12/13/2023    TRIG 121 02/17/2024    HDL 40 (L) 02/17/2024    LDLDIRECT 124 (H) 02/17/2024    ALT 12 02/17/2024    AST 15 02/17/2024    K 4.2 02/17/2024     02/17/2024    CREATININE 0.65 02/17/2024    BUN 9 02/17/2024    CO2 27 02/17/2024    TSH 1.31 09/25/2019    GLUF 86 02/17/2024    HGBA1C 5.3 02/17/2024     Lab Results   Component Value Date    URICACID 5.9 06/04/2021     Invalid input(s): \"BASENAME\" Vitamin D    XR shoulder 2+ vw left    Result Date: 2/18/2024  Narrative: XR SHOULDER 2+ VW LEFT INDICATION: Fall onto outstretched arm with subsequent shoulder pain. COMPARISON: None Views: 4 FINDINGS: No acute fracture. Acromioclavicular distance measures 6-7 mm which is top normal to borderline increased for a female. There is no elevation of the distal clavicle however in relation to the acromion. No widening of the coracoclavicular distance. No significant degenerative changes. No lytic or blastic osseous lesion. Unremarkable soft tissues. The " visualized left mid thorax appears intact. Suspected narrowing of the left lower neural foramina incidentally noted.     Impression: No fracture or dislocation. Acromioclavicular distance measures 6-7 mm which is top normal to borderline increased for a female. No elevation of the distal clavicle however in relation to the acromion. If there is clinical concern for AC joint injury, bilateral AC joint examination  with and without weightbearing may be considered. Suspected narrowing of the left lower neural foramina which could be a source for radicular symptoms. Dedicated cervical spine imaging with oblique views could be helpful for confirmation. The study was marked in EPIC for immediate notification. Resident: MARTINE HERNANDEZ I, the attending radiologist, have reviewed the images and agree with the final report above. Workstation performed: [id number]        POCT Labs        Depression Screening and Follow-up Plan: Patient was screened for depression during today's encounter. They screened negative with a PHQ-2 score of 0.

## 2024-02-29 NOTE — PATIENT INSTRUCTIONS
To Do:     Call for appt - Hepatology Ms. Kristina Zuleta PA-C - next appt 3/24  Call for appt Fam Hx CAD - Cardio Dr. Cerda - Next appt 10/24.

## 2024-02-29 NOTE — RESULT ENCOUNTER NOTE
Urinalysis show ketones, which can be a sign of dehydration.  Push 64 oz of water daily, or max as allowed by Bariatric Surgery team.  No signs of urinary tract infection.  Urine culture is pending.      Message sent to patient via [employer] patient portal.

## 2024-02-29 NOTE — ASSESSMENT & PLAN NOTE
Stable s statin.  Recommend lifestyle modifications.    The 10-year ASCVD risk score (Jordan QURESHI, et al., 2019) is: 1.5%    Values used to calculate the score:      Age: 53 years      Sex: Female      Is Non- : No      Diabetic: No      Tobacco smoker: No      Systolic Blood Pressure: 114 mmHg      Is BP treated: No      HDL Cholesterol: 40 mg/dL      Total Cholesterol: 188 mg/dL

## 2024-02-29 NOTE — ASSESSMENT & PLAN NOTE
Improved.  Recommend lifestyle modifications.  Management per weight management - s/p sleeve gastrectomy.

## 2024-03-01 ENCOUNTER — OFFICE VISIT (OUTPATIENT)
Dept: OBGYN CLINIC | Facility: CLINIC | Age: 54
End: 2024-03-01
Payer: COMMERCIAL

## 2024-03-01 ENCOUNTER — OFFICE VISIT (OUTPATIENT)
Dept: OBGYN CLINIC | Facility: CLINIC | Age: 54
End: 2024-03-01

## 2024-03-01 VITALS
WEIGHT: 205 LBS | SYSTOLIC BLOOD PRESSURE: 124 MMHG | BODY MASS INDEX: 35 KG/M2 | DIASTOLIC BLOOD PRESSURE: 80 MMHG | HEIGHT: 64 IN

## 2024-03-01 VITALS
SYSTOLIC BLOOD PRESSURE: 127 MMHG | HEART RATE: 64 BPM | HEIGHT: 64 IN | BODY MASS INDEX: 34.83 KG/M2 | WEIGHT: 204 LBS | DIASTOLIC BLOOD PRESSURE: 82 MMHG

## 2024-03-01 DIAGNOSIS — G89.11 ACUTE SHOULDER PAIN DUE TO TRAUMA, LEFT: ICD-10-CM

## 2024-03-01 DIAGNOSIS — Z12.31 ENCOUNTER FOR SCREENING MAMMOGRAM FOR MALIGNANT NEOPLASM OF BREAST: ICD-10-CM

## 2024-03-01 DIAGNOSIS — Z01.419 ENCOUNTER FOR GYNECOLOGICAL EXAMINATION (GENERAL) (ROUTINE) WITHOUT ABNORMAL FINDINGS: Primary | ICD-10-CM

## 2024-03-01 DIAGNOSIS — Z12.4 SCREENING FOR CERVICAL CANCER: ICD-10-CM

## 2024-03-01 DIAGNOSIS — M25.512 ACUTE SHOULDER PAIN DUE TO TRAUMA, LEFT: ICD-10-CM

## 2024-03-01 LAB — BACTERIA UR CULT: NORMAL

## 2024-03-01 PROCEDURE — G0145 SCR C/V CYTO,THINLAYER,RESCR: HCPCS | Performed by: PHYSICIAN ASSISTANT

## 2024-03-01 PROCEDURE — G0476 HPV COMBO ASSAY CA SCREEN: HCPCS | Performed by: PHYSICIAN ASSISTANT

## 2024-03-01 PROCEDURE — 99213 OFFICE O/P EST LOW 20 MIN: CPT | Performed by: ORTHOPAEDIC SURGERY

## 2024-03-01 NOTE — PROGRESS NOTES
ASSESSMENT & PLAN: Tessie Caosn is a 53 y.o.  with normal gynecologic exam.    1.  Routine well woman exam done today  2.  Pap and HPV:  The patient's last pap and hpv was unknown.    It was normal per patient.    Pap and cotesting was done today.    Current ASCCP Guidelines reviewed.   3.  Mammogram screening up-to-date from 2024 normal.  New order for  screening placed.  4.  Colonoscopy colon cancer screen up-to-date from .  Plan to repeat .  5. The following were reviewed in today's visit: breast self exam, menopause, adequate intake of calcium and vitamin D, exercise, healthy diet, and age-appropriate recommendation regarding screenings and prevention.      Patient noting lack of urgency when she needs to urinate since having her gastric bypass in December.  She has no other acute urinary symptoms and no vaginal symptoms at this time.  No significant pelvic pain, no abnormal vaginal bleeding.  Examination today unremarkable.  Urinalysis and urine culture ordered by her bariatric surgeons office.  Urinalysis showing trace protein, ketones, urine culture still pending.  She should reach out to specialty office to inquire about results when available and appropriate treatment.  If urine culture is negative I would recommend following up with PCP or urology for further evaluation.    RTO 1 year for annual, sooner problems arise in the interim.      CC:  Annual Gynecologic Examination    HPI: Tessie Cason is a 53 y.o.  who presents for annual gynecologic examination. She is a new patient with our office.     She has the following concerns:    Gx of gastric bypass in dec 2023, states since then hasn't had an urge to urinate.  States she denies vaginal or UTI sxs. States previously when she had to urinate she would have the urge to tell her to urinate.  Denies urinary incontinence or painful urination, pelvic pain or flank pain.   Her gastric specialist ran urine culture  yesterday with results pending.     Taking  vitamins, omeprazole.     Healthy diet Yes; diet popst bariatric surgery.   Vitamins Yes; as above  Exercise No    Patient's last menstrual period was 2024 (exact date).  States past year periods are sporadic x 1 year.     Menses frequency: states every 3 months.   Length of bleedin  Bleeding quality: average  Pain with menses:  none  States since gastric bypass surgery, bleeding and cramping have       Bowel or bladder changes: BM's changed in surgery, occasional constipation.       Health Maintenance:      She does not perform regular monthly self breast exams.  Denies breast pain, lump, skin change or nipple discharge.    She feels safe at home. Feels safe with her partner.     Last Pap: unknown last PAP  Last mammogram: 2024 - normal   Last colonoscopy: normal  - due in 10 yrs    Past Medical History:   Diagnosis Date    Arthritis     Class 2 severe obesity with serious comorbidity and body mass index (BMI) of 39.0 to 39.9 in adult      COVID-19 2020    Diabetes mellitus (HCC)     gestational    Diverticulosis 2022    Generalized anxiety disorder 2015    GERD (gastroesophageal reflux disease)     History of gestational diabetes ,     IFG (impaired fasting glucose)     Obesity     gastric sleeve today 2023    Osteoarthritis 2015    Osteoarthritis (Ankles & Knees)    Other hyperlipidemia 2021    Seborrheic dermatitis 2023    Vitamin D deficiency 2019       Past Surgical History:   Procedure Laterality Date    ARTHROSCOPY ANKLE Right 2016    OA     SECTION   &     CHOLECYSTECTOMY  2021    COLONOSCOPY      EGD  2023    Gastritis    EGD      ND LAPAROSCOPY SURG CHOLECYSTECTOMY N/A 2021    Procedure: LAPAROSCOPIC POSSIBLE OPEN CHOLECYSTECTOMY;  Surgeon: Dario Bowens MD;  Location:  MAIN OR;  Service: General    ND LAPS GSTRC RSTRICTIV PX LONGITUDINAL GASTRECTOMY N/A 2023     Procedure: GASTRECTOMY  SLEEVE W/ ROBOTICS & INTRAOPERATIVE EGD;  Surgeon: Jim Hogan MD;  Location: [facility];  Service: Bariatrics    TONSILLECTOMY         Past OB/Gyn History:  OB History          2    Para   2    Term   2            AB        Living   2         SAB        IAB        Ectopic        Multiple        Live Births   2               Pt does not have menstrual issues.    History of sexually transmitted infection: denies.  History of abnormal pap smears: No .    Patient is currently sexually active.  Monogamous with partner. Sometimes condoms use - he has appt for vasectomy.    The current method of family planning is condoms.    Family History   Problem Relation Age of Onset    Heart defect Mother         R to L Shunt    Muscular dystrophy Mother 31    Heart disease Mother     Diabetes Father 40        Type Unknown    Heart attack Father 31    Coronary artery disease Father 31    Heart disease Father     Diabetes Maternal Grandmother     Kidney disease Maternal Grandmother     No Known Problems Maternal Grandfather     Diabetes Paternal Grandmother     Heart disease Paternal Grandmother     Diabetes Paternal Grandfather     Heart disease Paternal Grandfather     No Known Problems Brother     No Known Problems Son     No Known Problems Son     No Known Problems Maternal Aunt     No Known Problems Paternal Aunt     No Known Problems Paternal Aunt     No Known Problems Paternal Aunt     No Known Problems Paternal Aunt        Family history of Breast/Uterine/Ovarian/Colon Cancer: none    Social History:  Social History     Socioeconomic History    Marital status: /Civil Union     Spouse name: Not on file    Number of children: 2    Years of education: Not on file    Highest education level: Not on file   Occupational History    Occupation: document control    Tobacco Use    Smoking status: Former     Current packs/day: 0.00     Average packs/day: 0.5 packs/day for 24.0 years  (12.0 ttl pk-yrs)     Types: Cigarettes     Start date: 1986     Quit date: 2010     Years since quittin.1    Smokeless tobacco: Never   Vaping Use    Vaping status: Never Used   Substance and Sexual Activity    Alcohol use: Not Currently     Alcohol/week: 1.0 standard drink of alcohol     Types: 1 Glasses of wine per week    Drug use: Not Currently     Types: Marijuana     Comment: Past use of Marijuana-teen years    Sexual activity: Yes     Partners: Male     Birth control/protection: Condom Male   Other Topics Concern    Not on file   Social History Narrative        Lives , 2 sons    2 children - 2 sons     at [facility]     Social Determinants of Health     Financial Resource Strain: Low Risk  (2023)    Overall Financial Resource Strain (CARDIA)     Difficulty of Paying Living Expenses: Not hard at all   Food Insecurity: No Food Insecurity (2023)    Hunger Vital Sign     Worried About Running Out of Food in the Last Year: Never true     Ran Out of Food in the Last Year: Never true   Transportation Needs: No Transportation Needs (2023)    PRAPARE - Transportation     Lack of Transportation (Medical): No     Lack of Transportation (Non-Medical): No   Physical Activity: Not on file   Stress: Not on file   Social Connections: Not on file   Intimate Partner Violence: Not on file   Housing Stability: Low Risk  (2023)    Housing Stability Vital Sign     Unable to Pay for Housing in the Last Year: No     Number of Places Lived in the Last Year: 1     Unstable Housing in the Last Year: No       Allergies   Allergen Reactions    Nsaids Other (See Comments)     Hx of weight loss surgery          Current Outpatient Medications:     cholecalciferol (VITAMIN D3) 1,000 units tablet, Take 1,000 Units by mouth 2 (two) times a day, Disp: , Rfl:     ketoconazole (NIZORAL) 2 % shampoo, Shampoo twice a week for 8 weeks, then as needed.  Leave on for 5 minutes before rinsing.   "Rx per Derm., Disp: 120 mL, Rfl: 0    Multiple Vitamin (multivitamin) capsule, Take 1 capsule by mouth daily, Disp: , Rfl:     omeprazole (PriLOSEC) 20 mg delayed release capsule, Take 1 capsule (20 mg total) by mouth daily, Disp: 90 capsule, Rfl: 1      Review of Systems  Constitutional :no fever, feels well, no tiredness, no recent weight gain or loss  ENT: no ear ache, no loss of hearing, no nosebleeds or nasal discharge, no sore throat or hoarseness.  Cardiovascular: no complaints of slow or fast heart beat, no chest pain, no palpitations, no leg claudication or lower extremity edema.  Respiratory: no complaints of shortness of shortness of breath, no IRIZARRY  Breasts:no complaints of breast pain, breast lump, or nipple discharge  Gastrointestinal: no complaints of abdominal pain, constipation, nausea, vomiting, or diarrhea or bloody stools  Genitourinary : Patient reports lack of urgency when she has to urinate which is new since gastric bypass in December.  No complaints of dysuria, incontinence, pelvic pain, no dysmenorrhea, vaginal discharge/itching/odor or abnormal vaginal bleeding and as noted in HPI.  Musculoskeletal: no complaints of arthralgia, no myalgia, no joint swelling or stiffness, no limb pain or swelling.  Integumentary: no complaints of skin rash or lesion, itching or dry skin  Neurological: no complaints of headache, no confusion, no numbness or tingling, no dizziness or fainting    Objective      /80 (BP Location: Left arm, Patient Position: Sitting, Cuff Size: Adult)   Ht 5' 4\" (1.626 m)   Wt 93 kg (205 lb)   LMP 01/30/2024 (Exact Date)   BMI 35.19 kg/m²     General:   appears stated age, cooperative, alert normal mood and affect.  BMI 35.19   Neck: normal, supple,trachea midline, no masses.  Thyroid palpated normal.   Heart: regular rate and rhythm, S1, S2 normal, no murmur, click, rub or gallop   Lungs: clear to auscultation bilaterally   Breasts: normal appearance, no masses or " tenderness, No nipple retraction or dimpling, No nipple discharge or bleeding, No axillary or supraclavicular adenopathy, Normal to palpation without dominant masses   Abdomen: soft, non-tender, without masses or organomegaly   Vulva: normal female genitalia, no lesions   Vagina: normal vagina, no discharge, exudate, lesion, or erythema   Urethra: normal   Cervix: Normal, no discharge. PAP done. Nontender.   Uterus: Nontender.   Adnexa: no mass, fullness, tenderness   Lymphatic palpation of lymph nodes in neck, axilla, groin and/or other locations: no lymphadenopathy or masses noted   Skin normal skin turgor and no rashes.   Psychiatric orientation to person, place, and time: normal. mood and affect: normal

## 2024-03-01 NOTE — RESULT ENCOUNTER NOTE
Urinalysis show ketones, which can be a sign of dehydration.  Push 64 oz of water daily, or max as allowed by Bariatric Surgery team.  No signs of urinary tract infection.  Urine culture is pending.

## 2024-03-01 NOTE — PROGRESS NOTES
CHIEF COMPLAIN/REASON FOR VISIT  Chief Complaint   Patient presents with    Left Shoulder - Pain       HISTORY OF PRESENT ILLNESS  Tessie Cason is a RHD 53 y.o. female who presents for evaluation of their left shoulder. Patient said on  she was snow blowing when she fell with her arms out stretched and landed on her left shoulder region. Patient said since the injury she has dealt with pain in the shoulder and limitations in range of motion. She admits to worsening pain with overhead activities. She denies pain or difficulty with range of motion prior to the injury.    REVIEW OF SYSTEMS  Review of systems was performed and, woutside that mentioned in the HPI, it was negative for symptomology related to the integumentary, hematologic, immunologic, allergic, neurologic, cardiovascular, respiratory, GI or  systems.     MEDICAL HISTORY  Patient Active Problem List   Diagnosis    Vitamin D deficiency    Positive HANH (antinuclear antibody)    Microscopic hematuria    Generalized anxiety disorder    Other hyperlipidemia    IFG (impaired fasting glucose)    Osteoarthritis    GERD (gastroesophageal reflux disease)    Class 1 obesity with serious comorbidity and body mass index (BMI) of 34.0 to 34.9 in adult    Seborrheic dermatitis       SURGICAL HISTORY  Past Surgical History:   Procedure Laterality Date    ARTHROSCOPY ANKLE Right     OA     SECTION   &     CHOLECYSTECTOMY  2021    COLONOSCOPY      EGD  2023    Gastritis    EGD      [facility] LAPAROSCOPY SURG CHOLECYSTECTOMY N/A 2021    Procedure: LAPAROSCOPIC POSSIBLE OPEN CHOLECYSTECTOMY;  Surgeon: Dario Bowens MD;  Location: [facility];  Service: General    [facility] LAPS [place] RSTRICTIV PX LONGITUDINAL GASTRECTOMY N/A 2023    Procedure: GASTRECTOMY  SLEEVE W/ ROBOTICS & INTRAOPERATIVE EGD;  Surgeon: Jim Hogan MD;  Location: [facility];  Service: Bariatrics    TONSILLECTOMY         CURRENT MEDICATIONS    Current  Outpatient Medications:     cholecalciferol (VITAMIN D3) 1,000 units tablet, Take 1,000 Units by mouth 2 (two) times a day, Disp: , Rfl:     ketoconazole (NIZORAL) 2 % shampoo, Shampoo twice a week for 8 weeks, then as needed.  Leave on for 5 minutes before rinsing.  Rx per Derm., Disp: 120 mL, Rfl: 0    Multiple Vitamin (multivitamin) capsule, Take 1 capsule by mouth daily, Disp: , Rfl:     omeprazole (PriLOSEC) 20 mg delayed release capsule, Take 1 capsule (20 mg total) by mouth daily, Disp: 90 capsule, Rfl: 1    SOCIAL HISTORY  Social History     Socioeconomic History    Marital status: /Civil Union     Spouse name: Not on file    Number of children: 2    Years of education: Not on file    Highest education level: Not on file   Occupational History    Occupation: document control    Tobacco Use    Smoking status: Former     Current packs/day: 0.00     Average packs/day: 0.5 packs/day for 24.0 years (12.0 ttl pk-yrs)     Types: Cigarettes     Start date: 1986     Quit date: 2010     Years since quittin.1    Smokeless tobacco: Never   Vaping Use    Vaping status: Never Used   Substance and Sexual Activity    Alcohol use: Not Currently     Alcohol/week: 1.0 standard drink of alcohol     Types: 1 Glasses of wine per week    Drug use: Not Currently     Types: Marijuana     Comment: Past use of Marijuana-teen years    Sexual activity: Yes     Partners: Male     Birth control/protection: Condom Male   Other Topics Concern    Not on file   Social History Narrative        Lives , 2 sons    2 children - 2 sons     at [facility]     Social Determinants of Health     Financial Resource Strain: Low Risk  (2023)    Overall Financial Resource Strain (CARDIA)     Difficulty of Paying Living Expenses: Not hard at all   Food Insecurity: No Food Insecurity (2023)    Hunger Vital Sign     Worried About Running Out of Food in the Last Year: Never true     Ran Out of Food in the  "Last Year: Never true   Transportation Needs: No Transportation Needs (8/31/2023)    PRAPARE - Transportation     Lack of Transportation (Medical): No     Lack of Transportation (Non-Medical): No   Physical Activity: Not on file   Stress: Not on file   Social Connections: Not on file   Intimate Partner Violence: Not on file   Housing Stability: Low Risk  (8/31/2023)    Housing Stability Vital Sign     Unable to Pay for Housing in the Last Year: No     Number of Places Lived in the Last Year: 1     Unstable Housing in the Last Year: No       Objective     VITAL SIGNS  /82 (BP Location: Right arm, Patient Position: Sitting, Cuff Size: Large)   Pulse 64   Ht 5' 4\" (1.626 m) Comment: verbal  Wt 92.5 kg (204 lb)   LMP 01/30/2024 (Exact Date)   BMI 35.02 kg/m²      PHYSICAL EXAM  General:   Well-appearing  No acute distress  Appears stated age    Left Shoulder  Negative tenderness to palpation over the acromioclavicular joint  Negative tenderness to palpation over the long head of the biceps tendon  Shoulder effusion none present  Shoulder abduction 75 / 160  Shoulder forward flexion 75 / 160  Shoulder internal rotation T12  Supraspinatus/ABD 3/5   Infraspinatus/ER 4/5   Negative Belly Press/SS  Positive Neer  Positive Padron  Positive O'briens  Skin is intact with no erythema, warmth or drainage  Motor strength intact distally  Sensation to light touch is normal in the axillary, radial, ulnar, and median nerve distributions.  Fingers warm and perfused    RADIOGRAPHIC EXAMINATION/DIAGNOSTICS:  Procedure: XR shoulder 2+ vw left    Result Date: 2/18/2024  Narrative: XR SHOULDER 2+ VW LEFT INDICATION: Fall onto outstretched arm with subsequent shoulder pain. COMPARISON: None Views: 4 FINDINGS: No acute fracture. Acromioclavicular distance measures 6-7 mm which is top normal to borderline increased for a female. There is no elevation of the distal clavicle however in relation to the acromion. No widening of the " coracoclavicular distance. No significant degenerative changes. No lytic or blastic osseous lesion. Unremarkable soft tissues. The visualized left mid thorax appears intact. Suspected narrowing of the left lower neural foramina incidentally noted.     Impression: No fracture or dislocation. Acromioclavicular distance measures 6-7 mm which is top normal to borderline increased for a female. No elevation of the distal clavicle however in relation to the acromion. If there is clinical concern for AC joint injury, bilateral AC joint examination  with and without weightbearing may be considered. Suspected narrowing of the left lower neural foramina which could be a source for radicular symptoms. Dedicated cervical spine imaging with oblique views could be helpful for confirmation. The study was marked in EPIC for immediate notification. Resident: MARTINE HERNANDEZ I, the attending radiologist, have reviewed the images and agree with the final report above. Workstation performed: [id number]     Procedure: Mammo screening bilateral w 3d & cad    Result Date: 1/19/2024  Narrative: DIAGNOSIS: Encounter for screening mammogram for breast cancer TECHNIQUE: Digital screening mammography was performed. Computer Aided Detection (CAD) analyzed all applicable images. COMPARISONS: None available. RELEVANT HISTORY: Family Breast Cancer History: No known family history of breast cancer. Family Medical History: No known relevant family medical history. Personal History: No known relevant hormone history. No known relevant surgical history. No known relevant medical history. The patient is scheduled in a reminder system for screening mammography. 8-10% of cancers will be missed on mammography. Management of a palpable abnormality must be based on clinical grounds.  Patients will be notified of their results via letter from our facility. Accredited by American College of Rad[employer] and FDA. RISK ASSESSMENT: 5 Year Tyrer-Cuzick: 0.62% 10  Year Tyrer-Cuzick: 1.31% Lifetime Tyrer-Cuzick: 5.5% TISSUE DENSITY: The breasts are almost entirely fatty. INDICATION: Tessie Cason is a 53 y.o. female presenting for baseline screening mammography. FINDINGS: Bilateral There are no suspicious masses, grouped microcalcifications or areas of unexplained architectural distortion. The skin and nipple areolar complex are unremarkable.  A few diffusely distributed calcifications are noted in each breast.     Impression: No mammographic evidence of malignancy. ASSESSMENT/BI-RADS CATEGORY: Left: 2 - Benign Right: 2 - Benign Overall: 2 - Benign RECOMMENDATION:      - Routine screening mammogram in 1 year for both breasts. Workstation ID: [id number]      ASSESSMENT/PLAN:  Left shoulder pain; r/o rotator cuff tear    History and clinical exam consistent with concern for soft tissue injury (rotator cuff pathology). Plan to obtain MRI of the patient shoulder to further evaluate soft tissue injury. Patient will follow up to discuss results of the study and treatment plan. Recommend RICE and anti-inflammatories at this time.  They are to call with any other questions or concerns.      Scribe Attestation      I,:  Dick Pleitez PA-C am acting as a scribe while in the presence of the attending physician.:       I,:  Arthur Laboy MD personally performed the services described in this documentation    as scribed in my presence.:

## 2024-03-04 LAB
HPV HR 12 DNA CVX QL NAA+PROBE: NEGATIVE
HPV16 DNA CVX QL NAA+PROBE: NEGATIVE
HPV18 DNA CVX QL NAA+PROBE: NEGATIVE

## 2024-03-04 NOTE — RESULT ENCOUNTER NOTE
Culture is negative for urinary tract infection.  Normal bacteria from the vaginal, rectal, and urinary tract seen.    Message sent to patient via [facility] patient portal.

## 2024-03-10 ENCOUNTER — HOSPITAL ENCOUNTER (OUTPATIENT)
Dept: MRI IMAGING | Facility: HOSPITAL | Age: 54
Discharge: HOME | End: 2024-03-10
Payer: COMMERCIAL

## 2024-03-10 DIAGNOSIS — G89.11 ACUTE SHOULDER PAIN DUE TO TRAUMA, LEFT: ICD-10-CM

## 2024-03-10 DIAGNOSIS — M25.512 ACUTE SHOULDER PAIN DUE TO TRAUMA, LEFT: ICD-10-CM

## 2024-03-10 PROCEDURE — 73221 MRI JOINT UPR EXTREM W/O DYE: CPT

## 2024-03-11 LAB
LAB AP GYN PRIMARY INTERPRETATION: NORMAL
Lab: NORMAL

## 2024-03-12 ENCOUNTER — OFFICE VISIT (OUTPATIENT)
Dept: OBGYN CLINIC | Facility: CLINIC | Age: 54
End: 2024-03-12
Payer: COMMERCIAL

## 2024-03-12 VITALS
WEIGHT: 204.6 LBS | SYSTOLIC BLOOD PRESSURE: 130 MMHG | HEART RATE: 70 BPM | HEIGHT: 64 IN | DIASTOLIC BLOOD PRESSURE: 83 MMHG | BODY MASS INDEX: 34.93 KG/M2

## 2024-03-12 DIAGNOSIS — M67.814 TENDINOSIS OF LEFT ROTATOR CUFF: Primary | ICD-10-CM

## 2024-03-12 PROCEDURE — 99213 OFFICE O/P EST LOW 20 MIN: CPT | Performed by: ORTHOPAEDIC SURGERY

## 2024-03-12 NOTE — PROGRESS NOTES
CHIEF COMPLAINT / REASON FOR VISIT  Tessie Cason is a 53 y.o. who is following up today to discuss the results of her recent imaging study.    HISTORY OF PRESENT ILLNESS  Patient reports they are doing about the same from when we last saw them. Patient does feel like her ROM has improved slightly from when we last saw her.    OBJECTIVE    General:   Well-appearing  No acute distress  Appears stated age    DIAGNOSTIC IMAGING:  Procedure: XR shoulder 2+ vw left    Result Date: 2/18/2024  Narrative: XR SHOULDER 2+ VW LEFT INDICATION: Fall onto outstretched arm with subsequent shoulder pain. COMPARISON: None Views: 4 FINDINGS: No acute fracture. Acromioclavicular distance measures 6-7 mm which is top normal to borderline increased for a female. There is no elevation of the distal clavicle however in relation to the acromion. No widening of the coracoclavicular distance. No significant degenerative changes. No lytic or blastic osseous lesion. Unremarkable soft tissues. The visualized left mid thorax appears intact. Suspected narrowing of the left lower neural foramina incidentally noted.     Impression: No fracture or dislocation. Acromioclavicular distance measures 6-7 mm which is top normal to borderline increased for a female. No elevation of the distal clavicle however in relation to the acromion. If there is clinical concern for AC joint injury, bilateral AC joint examination  with and without weightbearing may be considered. Suspected narrowing of the left lower neural foramina which could be a source for radicular symptoms. Dedicated cervical spine imaging with oblique views could be helpful for confirmation. The study was marked in EPIC for immediate notification. Resident: MARTINE HERNANDEZ I, the attending radiologist, have reviewed the images and agree with the final report above. Workstation performed: [id number]     ASSESSMENT/PLAN:  Left rotator cuff tendinosis    Today, we discussed conservative  treatment options including but are certainly not limited to: Rest, ice, compression, elevation, activity modification, anti-inflammatory medication, physical therapy, and/or injections.  We discussed benefits of each potential treatment option.  After discussion, patient was agreeable to trying Rest, Ice, Compression, Elevation, Activity Modification, and Anti-inflammatory Medication.  We will plan to follow up with the patient in 3 months.  They are understanding that if the pain should worsen or they develop new symptoms to please reach out to us sooner. Patient is understanding and agreeable to this plan.

## 2024-03-20 ENCOUNTER — OFFICE VISIT (OUTPATIENT)
Dept: BARIATRICS | Facility: CLINIC | Age: 54
End: 2024-03-20
Payer: COMMERCIAL

## 2024-03-20 VITALS
SYSTOLIC BLOOD PRESSURE: 104 MMHG | WEIGHT: 194.5 LBS | DIASTOLIC BLOOD PRESSURE: 60 MMHG | HEIGHT: 65 IN | BODY MASS INDEX: 32.4 KG/M2 | TEMPERATURE: 97.8 F | HEART RATE: 67 BPM

## 2024-03-20 DIAGNOSIS — Z48.815 ENCOUNTER FOR SURGICAL AFTERCARE FOLLOWING SURGERY OF DIGESTIVE SYSTEM: Primary | ICD-10-CM

## 2024-03-20 DIAGNOSIS — K21.9 GERD (GASTROESOPHAGEAL REFLUX DISEASE): ICD-10-CM

## 2024-03-20 DIAGNOSIS — L98.7 EXCESS SKIN: ICD-10-CM

## 2024-03-20 DIAGNOSIS — E66.9 OBESITY, CLASS I, BMI 30-34.9: ICD-10-CM

## 2024-03-20 DIAGNOSIS — Z98.84 BARIATRIC SURGERY STATUS: ICD-10-CM

## 2024-03-20 DIAGNOSIS — K91.2 POSTSURGICAL MALABSORPTION: ICD-10-CM

## 2024-03-20 PROCEDURE — 99214 OFFICE O/P EST MOD 30 MIN: CPT | Performed by: NURSE PRACTITIONER

## 2024-03-20 NOTE — PROGRESS NOTES
Assessment/Plan:     Patient ID: Tessie Cason is a 53 y.o. female.     Bariatric Surgery Status/BMI 32/GERD    -s/p Vertical Sleeve Gastrectomy with Dr. Hogan on 12/12/2023. Presents to the office today for 2nd post op visit. Overall doing well, tolerating a regular diet. Denies having any abdominal pain, N/V/D, regurgitation, reflux or dysphagia. Does have constipation - takes miralax PRN which is effective. Did have issues with increasing her protein intake and fluid intake but this has slowly improved. She is working on increasing this.     PLAN:     - continue with omeprazole once daily for now. Plan to taper off at next visit.   - Routine follow up in 3 months for 3rd post op visit.   - Continue with healthy lifestyle, adequate protein intake of 60 gm, fluid intake of at least 64 oz.   - Continue with MVI daily.   - Activity as tolerated.   - Labs ordered and will adjust accordingly if any deficiency.   - Follow up with RD and SW as needed.     Excess skin - having increase in moisture and odor from her excess skin of abdominal pannus. Trying to keep region clean and dry as much as possible.   - supportive care for now. Recommended to avoid surgical interventions until she is at least 18 month post op.       Continued/Maintain healthy weight loss with good nutrition intakes.  Adequate hydration with at least 64oz. fluid intake.  Follow diet as discussed.  Follow vitamin and mineral recommendations as reviewed with you.  Exercise as tolerated.    Colonoscopy referral made: UTD  Mammogram - UTD    Follow-up in 3 months for 3rd post op visit. We kindly ask that your arrive 15 minutes before your scheduled appointment time with your provider to allow our staff to room you, get your vital signs and update your chart.    Get lab work done prior to visit. Please call the office if you need a script.  It is recommended to check with your insurance BEFORE getting labs done to make sure they are covered by your  policy.      Call our office if you have any problems with abdominal pain especially associated with fever, chills, nausea, vomiting or any other concerns.    All  Post-bariatric surgery patients should be aware that very small quantities of any alcohol can cause impairment and it is very possible not to feel the effect. The effect can be in the system for several hours.  It is also a stomach irritant.     It is advised to AVOID alcohol, Nonsteroidal antiinflammatory drugs (NSAIDS) and nicotine of all forms . Any of these can cause stomach irritation/pain.    Discussed the effects of alcohol on a bariatric patient and the increased impairment risk.     Keep up the good work!     Postsurgical Malabsorption   -At risk for malabsorption of vitamins/minerals secondary to malabsorption and restriction of intake from bariatric surgery  -Currently taking adequate postop bariatric surgery vitamin supplementation  -Next set of bariatric labs ordered for approximately 2 weeks  -Patient received education about the importance of adhering to a lifelong supplementation regimen to avoid vitamin/mineral deficiencies      Diagnoses and all orders for this visit:    Encounter for surgical aftercare following surgery of digestive system  -     CBC; Future  -     Comprehensive metabolic panel; Future  -     Folate; Future  -     Iron Panel (Includes Ferritin, Iron Sat%, Iron, and TIBC); Future  -     PTH, intact; Future  -     Vitamin B1, whole blood; Future  -     Vitamin A; Future  -     Vitamin B12; Future  -     Vitamin D 25 hydroxy; Future  -     Zinc; Future    Bariatric surgery status  -     CBC; Future  -     Comprehensive metabolic panel; Future  -     Folate; Future  -     Iron Panel (Includes Ferritin, Iron Sat%, Iron, and TIBC); Future  -     PTH, intact; Future  -     Vitamin B1, whole blood; Future  -     Vitamin A; Future  -     Vitamin B12; Future  -     Vitamin D 25 hydroxy; Future  -     Zinc; Future    Postsurgical  malabsorption  -     CBC; Future  -     Comprehensive metabolic panel; Future  -     Folate; Future  -     Iron Panel (Includes Ferritin, Iron Sat%, Iron, and TIBC); Future  -     PTH, intact; Future  -     Vitamin B1, whole blood; Future  -     Vitamin A; Future  -     Vitamin B12; Future  -     Vitamin D 25 hydroxy; Future  -     Zinc; Future    Obesity, Class I, BMI 30-34.9  -     CBC; Future  -     Comprehensive metabolic panel; Future  -     Folate; Future  -     Iron Panel (Includes Ferritin, Iron Sat%, Iron, and TIBC); Future  -     PTH, intact; Future  -     Vitamin B1, whole blood; Future  -     Vitamin A; Future  -     Vitamin B12; Future  -     Vitamin D 25 hydroxy; Future  -     Zinc; Future    BMI 32.0-32.9,adult  -     CBC; Future  -     Comprehensive metabolic panel; Future  -     Folate; Future  -     Iron Panel (Includes Ferritin, Iron Sat%, Iron, and TIBC); Future  -     PTH, intact; Future  -     Vitamin B1, whole blood; Future  -     Vitamin A; Future  -     Vitamin B12; Future  -     Vitamin D 25 hydroxy; Future  -     Zinc; Future    GERD (gastroesophageal reflux disease)  -     CBC; Future  -     Comprehensive metabolic panel; Future  -     Folate; Future  -     Iron Panel (Includes Ferritin, Iron Sat%, Iron, and TIBC); Future  -     PTH, intact; Future  -     Vitamin B1, whole blood; Future  -     Vitamin A; Future  -     Vitamin B12; Future  -     Vitamin D 25 hydroxy; Future  -     Zinc; Future    Excess skin         Subjective:      Patient ID: Tessie Cason is a 53 y.o. female.    -s/p Vertical Sleeve Gastrectomy with Dr. Hogan on 12/12/2023. Presents to the office today for 2nd post op visit. Overall doing well, tolerating a regular diet. Denies having any abdominal pain, N/V/D, regurgitation, reflux or dysphagia. Does have constipation - takes miralax PRN which is effective. Did have issues with increasing her protein intake and fluid intake but this has slowly improved. She is  "working on increasing this.     Initial: 244 lbs  Current: 194.5 lbs  EWL: (Weight loss is ahead of schedule at this post surgical period.)  Aravind: current   Current BMI is Body mass index is 32.87 kg/m².    Tolerating a regular diet-yes  Eating at least 60 grams of protein per day-yes  Following 30/60 minute rule with liquids-yes  Drinking at least 64 ounces of fluid per day-yes  Drinking carbonated beverages-no  Sufficient exercise-yes - walking   Using NSAIDs regularly-no  Using nicotine-no  Using alcohol-no  Supplements: 4 veronica fusion Multivitamins and Calcium (skips caclium if taking protein shake) + vitamin D3 2000 IU     EWL is 52%, which places the patient ahead of schedule for expected post surgical weight loss at this time.     The following portions of the patient's history were reviewed and updated as appropriate: allergies, current medications, past family history, past medical history, past social history, past surgical history and problem list.    Review of Systems   Constitutional:  Positive for fatigue.   Respiratory: Negative.     Cardiovascular: Negative.    Gastrointestinal:  Positive for constipation.   Musculoskeletal: Negative.    Neurological: Negative.    Psychiatric/Behavioral: Negative.           Objective:    /60   Pulse 67   Temp 97.8 °F (36.6 °C) (Tympanic)   Ht 5' 4.5\" (1.638 m)   Wt 88.2 kg (194 lb 8 oz)   LMP 01/30/2024 (Exact Date)   BMI 32.87 kg/m²      Physical Exam  Vitals and nursing note reviewed.   Constitutional:       Appearance: Normal appearance. She is obese.   Cardiovascular:      Rate and Rhythm: Normal rate and regular rhythm.      Pulses: Normal pulses.      Heart sounds: Normal heart sounds.   Pulmonary:      Effort: Pulmonary effort is normal.      Breath sounds: Normal breath sounds.   Abdominal:      General: Bowel sounds are normal.      Palpations: Abdomen is soft.      Tenderness: There is no abdominal tenderness.   Musculoskeletal:         General: " Normal range of motion.   Skin:     General: Skin is warm and dry.   Neurological:      General: No focal deficit present.      Mental Status: She is alert and oriented to person, place, and time.   Psychiatric:         Mood and Affect: Mood normal.         Behavior: Behavior normal.         Thought Content: Thought content normal.         Judgment: Judgment normal.

## 2024-03-21 ENCOUNTER — TELEPHONE (OUTPATIENT)
Age: 54
End: 2024-03-21

## 2024-03-21 NOTE — TELEPHONE ENCOUNTER
Caller: patient    Doctor: li    Reason for call: calling to check status on work accomodation paper work    Call back#: [phone]

## 2024-03-28 ENCOUNTER — EVALUATION (OUTPATIENT)
Dept: PHYSICAL THERAPY | Facility: REHABILITATION | Age: 54
End: 2024-03-28
Payer: COMMERCIAL

## 2024-03-28 DIAGNOSIS — G89.11 ACUTE SHOULDER PAIN DUE TO TRAUMA, LEFT: ICD-10-CM

## 2024-03-28 DIAGNOSIS — M25.512 ACUTE SHOULDER PAIN DUE TO TRAUMA, LEFT: ICD-10-CM

## 2024-03-28 DIAGNOSIS — M67.814 TENDINOSIS OF LEFT ROTATOR CUFF: Primary | ICD-10-CM

## 2024-03-28 PROCEDURE — 97530 THERAPEUTIC ACTIVITIES: CPT | Performed by: PHYSICAL THERAPIST

## 2024-03-28 PROCEDURE — 97112 NEUROMUSCULAR REEDUCATION: CPT | Performed by: PHYSICAL THERAPIST

## 2024-03-28 PROCEDURE — 97161 PT EVAL LOW COMPLEX 20 MIN: CPT | Performed by: PHYSICAL THERAPIST

## 2024-03-28 NOTE — PROGRESS NOTES
PT Evaluation     Today's date: 3/28/2024  Patient name: Tessie Cason  : 1970  MRN: [id number]  Referring provider: Arthur Laboy MD  Dx:   Encounter Diagnosis     ICD-10-CM    1. Tendinosis of left rotator cuff  M67.814       2. Acute shoulder pain due to trauma, left  M25.512 Ambulatory Referral to Physical Therapy    G89.11           Start Time: 1605  Stop Time: 1645  Total time in clinic (min): 40 minutes    Assessment  Assessment details: Tessie Cason is a 53 y.o. year old female who presents to [facility] with Tendinosis of left rotator cuff  (primary encounter diagnosis)  Acute shoulder pain due to trauma, left.  She presents with limitations in left shoulder active range of motion and strength deficits limiting functional tasks such as overhead movement, reaching and lifting.  Tessie presents with the impairments as listed above and would benefit from Physical Therapy to address these impairments, improved quality of life and to maximize function.     Impairments: abnormal or restricted ROM, activity intolerance, impaired physical strength, lacks appropriate home exercise program, pain with function and poor posture   Understanding of Dx/Px/POC: good   Prognosis: good    Goals  ST-4 weeks  1. Patient will report <7/10 pain with left shoulder motion.  2. Patient will demonstrate improved left shoulder strength to at least 4/5.    LT-6 weeks  1. Patient will be independent with HEP.  2. Patient will report no pain while sleeping at night to improve rest and overall quality of life.        Plan  Plan details: Thank you for opportunity to participate in the care of Tessie Cason.    Patient would benefit from: PT eval and skilled physical therapy  Planned modality interventions: cryotherapy, unattended electrical stimulation and TENS  Planned therapy interventions: activity modification, flexibility, home exercise program, therapeutic exercise, therapeutic activities, stretching,  strengthening, postural training, patient education, neuromuscular re-education, manual therapy and joint mobilization  Frequency: 1x week  Duration in visits: 8  Duration in weeks: 8  Plan of Care beginning date: 3/28/2024  Plan of Care expiration date: 2024  Treatment plan discussed with: patient      Subjective Evaluation    History of Present Illness  Mechanism of injury: Patient is a 53 y.o. presenting to physical therapy with complaints of left shoulder pain beginning in February after a fall while snow blowing. She fell forward onto outstretched arms. Since then she was having a lot of pain with sleeping, driving, etc. She saw Dr. Laboy and had an MRI done (results below). She received an injection which helped with pain and mobility but she still feels limited. She has discomfort with putting hair up, grabbing something, lateral movements, laying on her sides while sleeping and holding her 7 month old grandson. She has accomodation for work secondary to lifting and reaching tasks when she works overtime. She notices a lot of clicking in the shoulder as well.     N/T/Radicular pain: None - an get N/T in left fingers but has been ongoing prior to this injury  Imaging: MRI       1.  There is a small partial thickness articular surface tear of the supraspinatus at the humeral insertion involving less than 50% of fibers (series 8 image 7 through 9). This measures 4 x 5 mm (AP by transverse). No full-thickness tear, tendon   retraction or muscle atrophy.  2. Insertional tendinosis of the subscapularis, supraspinatus and infraspinatus tendons.  3. Moderate biceps tendinosis.    Patient Goals  Patient goals for therapy: increased motion, decreased pain, increased strength, independence with ADLs/IADLs, return to sport/leisure activities and return to work  Patient goal: regain full mobility  Pain  Current pain ratin  At best pain ratin  At worst pain ratin  Location: front, lateral and posterior  "shoulder  Quality: sharp    Social Support    Employment status: working ( at [employer])  Hand dominance: right    Treatments  Current treatment: injection treatment and physical therapy      Objective     Tenderness     Left Shoulder   Tenderness in the biceps tendon (proximal). No tenderness in the AC joint, acromion and supraspinatus tendon.     Active Range of Motion   Left Shoulder   Flexion: 145 degrees with pain  Abduction: 70 degrees with pain  External rotation 45°: 70 degrees with pain  Internal rotation BTB: lumbar     Right Shoulder   Normal active range of motion  External rotation BTH: T2   Internal rotation BTB: T10     Additional Active Range of Motion Details  Seated AROM screen:   Flexion 75%   Abduction 50%    Passive Range of Motion   Left Shoulder   Flexion: 150 degrees with pain  Abduction: 120 degrees with pain  External rotation 45°: 80 degrees     Strength/Myotome Testing     Left Shoulder     Planes of Motion   Flexion: 4-   Abduction: 3+   External rotation at 0°: 4   Internal rotation at 0°: 4     Isolated Muscles   Biceps: 5   Triceps: 5     Right Shoulder     Planes of Motion   Flexion: 4+   Abduction: 4+   External rotation at 0°: 4+   Internal rotation at 0°: 4+     Isolated Muscles   Biceps: 5   Triceps: 5                    Precautions: n/a    Daily Treatment Diary:    Manuals 3/28/2024                                               Neuromuscular Re-education            Pulleys flx/scap            Scap retractions 2x10x5\" (3x10 nv)           RTC IR/ER iso 10x5\" ea (try band AROM nv)           Shoulder flx iso 10x5\"           Shoulder abd iso 10x5\"                       TB low rows unilateral nv           TB mid rows unilateral nv           TB ER nv           TB IR  nv           TB bilateral ER w retraction            TB horizontal abd                                                Therapeutic Exercise            UBE             Cane ER            Cane flexion            Table " slides flx            Table slides scaption                                    Prone I’s            Prone T’s            Prone Y’s            SL ER nv           SL abduction            SL flexion            Therapeutic Activity            Wall Ball rolls            Standing Scaption            Pt education Done                       * = on HEP

## 2024-03-30 ENCOUNTER — APPOINTMENT (OUTPATIENT)
Dept: LAB | Facility: HOSPITAL | Age: 54
End: 2024-03-30
Payer: COMMERCIAL

## 2024-03-30 DIAGNOSIS — E66.9 OBESITY, CLASS I, BMI 30-34.9: ICD-10-CM

## 2024-03-30 DIAGNOSIS — K21.9 GERD (GASTROESOPHAGEAL REFLUX DISEASE): ICD-10-CM

## 2024-03-30 DIAGNOSIS — Z48.815 ENCOUNTER FOR SURGICAL AFTERCARE FOLLOWING SURGERY OF DIGESTIVE SYSTEM: ICD-10-CM

## 2024-03-30 DIAGNOSIS — Z98.84 BARIATRIC SURGERY STATUS: ICD-10-CM

## 2024-03-30 DIAGNOSIS — K91.2 POSTSURGICAL MALABSORPTION: ICD-10-CM

## 2024-03-30 LAB
25(OH)D3 SERPL-MCNC: 49.3 NG/ML (ref 30–100)
ALBUMIN SERPL BCP-MCNC: 4.4 G/DL (ref 3.5–5)
ALP SERPL-CCNC: 71 U/L (ref 34–104)
ALT SERPL W P-5'-P-CCNC: 13 U/L (ref 7–52)
ANION GAP SERPL CALCULATED.3IONS-SCNC: 8 MMOL/L (ref 4–13)
AST SERPL W P-5'-P-CCNC: 14 U/L (ref 13–39)
BILIRUB SERPL-MCNC: 0.63 MG/DL (ref 0.2–1)
BUN SERPL-MCNC: 10 MG/DL (ref 5–25)
CALCIUM SERPL-MCNC: 10.3 MG/DL (ref 8.4–10.2)
CHLORIDE SERPL-SCNC: 102 MMOL/L (ref 96–108)
CO2 SERPL-SCNC: 29 MMOL/L (ref 21–32)
CREAT SERPL-MCNC: 0.73 MG/DL (ref 0.6–1.3)
ERYTHROCYTE [DISTWIDTH] IN BLOOD BY AUTOMATED COUNT: 14.3 % (ref 11.6–15.1)
FERRITIN SERPL-MCNC: 28 NG/ML (ref 11–307)
FOLATE SERPL-MCNC: >22.3 NG/ML
GFR SERPL CREATININE-BSD FRML MDRD: 95 ML/MIN/1.73SQ M
GLUCOSE P FAST SERPL-MCNC: 89 MG/DL (ref 65–99)
HCT VFR BLD AUTO: 41.2 % (ref 34.8–46.1)
HGB BLD-MCNC: 13.5 G/DL (ref 11.5–15.4)
IRON SATN MFR SERPL: 31 % (ref 15–50)
IRON SERPL-MCNC: 89 UG/DL (ref 50–212)
MCH RBC QN AUTO: 30.9 PG (ref 26.8–34.3)
MCHC RBC AUTO-ENTMCNC: 32.8 G/DL (ref 31.4–37.4)
MCV RBC AUTO: 94 FL (ref 82–98)
PLATELET # BLD AUTO: 277 THOUSANDS/UL (ref 149–390)
PMV BLD AUTO: 9.8 FL (ref 8.9–12.7)
POTASSIUM SERPL-SCNC: 4.1 MMOL/L (ref 3.5–5.3)
PROT SERPL-MCNC: 7.7 G/DL (ref 6.4–8.4)
PTH-INTACT SERPL-MCNC: 28.8 PG/ML (ref 12–88)
RBC # BLD AUTO: 4.37 MILLION/UL (ref 3.81–5.12)
SODIUM SERPL-SCNC: 139 MMOL/L (ref 135–147)
TIBC SERPL-MCNC: 288 UG/DL (ref 250–450)
UIBC SERPL-MCNC: 199 UG/DL (ref 155–355)
VIT B12 SERPL-MCNC: 453 PG/ML (ref 180–914)
WBC # BLD AUTO: 6.75 THOUSAND/UL (ref 4.31–10.16)

## 2024-03-30 PROCEDURE — 82306 VITAMIN D 25 HYDROXY: CPT

## 2024-03-30 PROCEDURE — 84590 ASSAY OF VITAMIN A: CPT

## 2024-03-30 PROCEDURE — 80053 COMPREHEN METABOLIC PANEL: CPT

## 2024-03-30 PROCEDURE — 83540 ASSAY OF IRON: CPT

## 2024-03-30 PROCEDURE — 84630 ASSAY OF ZINC: CPT

## 2024-03-30 PROCEDURE — 84425 ASSAY OF VITAMIN B-1: CPT

## 2024-03-30 PROCEDURE — 83550 IRON BINDING TEST: CPT

## 2024-03-30 PROCEDURE — 82607 VITAMIN B-12: CPT

## 2024-03-30 PROCEDURE — 82728 ASSAY OF FERRITIN: CPT

## 2024-03-30 PROCEDURE — 83970 ASSAY OF PARATHORMONE: CPT

## 2024-03-30 PROCEDURE — 82746 ASSAY OF FOLIC ACID SERUM: CPT

## 2024-03-30 PROCEDURE — 85027 COMPLETE CBC AUTOMATED: CPT

## 2024-03-30 PROCEDURE — 36415 COLL VENOUS BLD VENIPUNCTURE: CPT

## 2024-04-02 LAB — VIT B1 BLD-SCNC: 155.3 NMOL/L (ref 66.5–200)

## 2024-04-04 LAB
VIT A SERPL-MCNC: 23.6 UG/DL (ref 20.1–62)
ZINC SERPL-MCNC: 86 UG/DL (ref 44–115)

## 2024-06-04 ENCOUNTER — TELEPHONE (OUTPATIENT)
Age: 54
End: 2024-06-04

## 2024-06-04 DIAGNOSIS — L21.9 SEBORRHEIC DERMATITIS OF SCALP: ICD-10-CM

## 2024-06-04 RX ORDER — KETOCONAZOLE 20 MG/ML
SHAMPOO TOPICAL
Qty: 120 ML | Refills: 0 | Status: SHIPPED | OUTPATIENT
Start: 2024-06-04

## 2024-06-04 NOTE — TELEPHONE ENCOUNTER
Tessie    Derm: Mary Eisenberg MD     Patient is going on vacation leaving on Friday and needs her special shampoo    ketoconazole (NIZORAL) 2 % shampoo     Patient has an appt for August    Pharmacy:   [facility]/pharmacy #[id number] - [place] [id number]  Phone: [phone]  Fax: [phone]  MAIKEL #: [id number]     CB: [phone]

## 2024-06-09 DIAGNOSIS — E66.01 OBESITY, CLASS III, BMI 40-49.9 (MORBID OBESITY) (HCC): ICD-10-CM

## 2024-06-09 RX ORDER — OMEPRAZOLE 20 MG/1
20 CAPSULE, DELAYED RELEASE ORAL DAILY
Qty: 90 CAPSULE | Refills: 1 | Status: SHIPPED | OUTPATIENT
Start: 2024-06-09

## 2024-06-14 ENCOUNTER — OFFICE VISIT (OUTPATIENT)
Dept: BARIATRICS | Facility: CLINIC | Age: 54
End: 2024-06-14
Payer: COMMERCIAL

## 2024-06-14 VITALS
WEIGHT: 174 LBS | SYSTOLIC BLOOD PRESSURE: 100 MMHG | BODY MASS INDEX: 28.99 KG/M2 | OXYGEN SATURATION: 98 % | TEMPERATURE: 98 F | HEART RATE: 62 BPM | DIASTOLIC BLOOD PRESSURE: 64 MMHG | HEIGHT: 65 IN

## 2024-06-14 DIAGNOSIS — K91.2 POSTSURGICAL MALABSORPTION: ICD-10-CM

## 2024-06-14 DIAGNOSIS — Z98.84 STATUS POST LAPAROSCOPIC SLEEVE GASTRECTOMY: Primary | ICD-10-CM

## 2024-06-14 PROCEDURE — 99213 OFFICE O/P EST LOW 20 MIN: CPT | Performed by: SURGERY

## 2024-06-14 NOTE — PROGRESS NOTES
FOLLOW UP VISIT - BARIATRIC SURGERY  Tessie Cason 53 y.o. female MRN: [id number]  Unit/Bed#:  Encounter: [id number]      HPI:  Tessie Cason is a 53 y.o. female who presents status post sleeve gastrectomy on 2023.      Review of Systems   All other systems reviewed and are negative.      Historical Information   Past Medical History:   Diagnosis Date    Arthritis     Class 2 severe obesity with serious comorbidity and body mass index (BMI) of 39.0 to 39.9 in adult (HCC)     COVID-19 2020    Diabetes mellitus (HCC)     gestational    Diverticulosis 2022    Generalized anxiety disorder 2015    GERD (gastroesophageal reflux disease)     History of gestational diabetes ,     IFG (impaired fasting glucose)     Obesity     gastric sleeve today 2023    Osteoarthritis 2015    Osteoarthritis (Ankles & Knees)    Other hyperlipidemia 2021    Seborrheic dermatitis 2023    Vitamin D deficiency 2019     Past Surgical History:   Procedure Laterality Date    ARTHROSCOPY ANKLE Right 2016    OA     SECTION   &     CHOLECYSTECTOMY  2021    COLONOSCOPY      EGD  2023    Gastritis    EGD      ND LAPAROSCOPY SURG CHOLECYSTECTOMY N/A 2021    Procedure: LAPAROSCOPIC POSSIBLE OPEN CHOLECYSTECTOMY;  Surgeon: Dario Bowens MD;  Location: [facility];  Service: General    ND LAPS GSTRC RSTRICTIV PX LONGITUDINAL GASTRECTOMY N/A 2023    Procedure: GASTRECTOMY  SLEEVE W/ ROBOTICS & INTRAOPERATIVE EGD;  Surgeon: Jim Hogan MD;  Location: [facility];  Service: Bariatrics    TONSILLECTOMY       Social History   Social History     Substance and Sexual Activity   Alcohol Use Not Currently    Alcohol/week: 1.0 standard drink of alcohol    Types: 1 Glasses of wine per week    Comment: Very seldomly     Social History     Substance and Sexual Activity   Drug Use Not Currently    Types: Marijuana    Comment: As a teenager     Social History  "    Tobacco Use   Smoking Status Former    Current packs/day: 0.00    Average packs/day: 0.5 packs/day for 24.0 years (12.0 ttl pk-yrs)    Types: Cigarettes    Start date: 1986    Quit date: 2010    Years since quittin.4   Smokeless Tobacco Never     Family History: non-contributory    Meds/Allergies   all medications and allergies reviewed  Allergies   Allergen Reactions    Nsaids Other (See Comments)     Hx of weight loss surgery          Objective       Current Vitals:   Blood Pressure: 100/64 (24 1352)  Pulse: 62 (24 1352)  Temperature: 98 °F (36.7 °C) (24 1352)  Temp Source: Tympanic (24 135)  Height: 5' 4.5\" (163.8 cm) (24 135)  Weight - Scale: 78.9 kg (174 lb) (24 1352)  SpO2: 98 % (24 135)        Invasive Devices       Peripheral Intravenous Line  Duration             Peripheral IV 23 Dorsal (posterior);Right Forearm 185 days                    Physical Exam  Vitals reviewed.   Constitutional:       General: She is not in acute distress.     Appearance: She is well-developed. She is not diaphoretic.   HENT:      Head: Normocephalic and atraumatic.      Right Ear: External ear normal.      Left Ear: External ear normal.      Nose: Nose normal.   Eyes:      General: No scleral icterus.        Right eye: No discharge.         Left eye: No discharge.      Conjunctiva/sclera: Conjunctivae normal.   Neurological:      Mental Status: She is alert and oriented to person, place, and time.   Psychiatric:         Mood and Affect: Mood and affect normal.         Behavior: Behavior normal.         Thought Content: Thought content normal.         Judgment: Judgment normal.         Lab Results: I have personally reviewed pertinent lab results.    Imaging: I have personally reviewed pertinent reports.    EKG, Pathology, and Other Studies: I have personally reviewed pertinent reports.        Assessment/PLAN:    53 y.o. female status post laparoscopic sleeve " gastrectomy on December 12, 2023.  She has lost 70 pounds so far and feels great.    I am ordering some nutritional labs for her to be done prior to her next appointment    I had a detailed discussion with her stressing the fact that long-term success is largely dependent on compliance and abidance to the recommendations of the program as well as participation within the support groups.    She is committed to continue to observe her diet and to increase her physical activity.    She will follow up with us as scheduled.    Jim Hogan MD  6/14/2024  2:03 PM      .

## 2024-07-08 ENCOUNTER — TELEPHONE (OUTPATIENT)
Dept: DERMATOLOGY | Facility: CLINIC | Age: 54
End: 2024-07-08

## 2024-07-08 NOTE — TELEPHONE ENCOUNTER
Called and left a voicemail to patient that she needs a an office visit in order for her ketoconazole prescription to get refilled. Patient has not been seen for over a year, appointment can be virtual with an AP.

## 2024-08-08 ENCOUNTER — OFFICE VISIT (OUTPATIENT)
Dept: DERMATOLOGY | Facility: CLINIC | Age: 54
End: 2024-08-08
Payer: COMMERCIAL

## 2024-08-08 VITALS — TEMPERATURE: 97.3 F | BODY MASS INDEX: 28.91 KG/M2 | HEIGHT: 65 IN | WEIGHT: 173.5 LBS

## 2024-08-08 DIAGNOSIS — L21.9 SEBORRHEIC DERMATITIS OF SCALP: Primary | ICD-10-CM

## 2024-08-08 PROCEDURE — 99214 OFFICE O/P EST MOD 30 MIN: CPT | Performed by: STUDENT IN AN ORGANIZED HEALTH CARE EDUCATION/TRAINING PROGRAM

## 2024-08-08 RX ORDER — KETOCONAZOLE 20 MG/G
CREAM TOPICAL
Qty: 60 G | Refills: 1 | Status: SHIPPED | OUTPATIENT
Start: 2024-08-08

## 2024-08-08 RX ORDER — TACROLIMUS 1 MG/G
OINTMENT TOPICAL
Qty: 100 G | Refills: 1 | Status: SHIPPED | OUTPATIENT
Start: 2024-08-08

## 2024-08-08 RX ORDER — CLOBETASOL PROPIONATE 0.5 MG/ML
SOLUTION TOPICAL
Qty: 50 ML | Refills: 3 | Status: SHIPPED | OUTPATIENT
Start: 2024-08-08

## 2024-08-08 RX ORDER — KETOCONAZOLE 20 MG/ML
SHAMPOO TOPICAL
Qty: 120 ML | Refills: 6 | Status: SHIPPED | OUTPATIENT
Start: 2024-08-08

## 2024-08-08 NOTE — PROGRESS NOTES
"[facility] Note     Patient Name: Tessie Cason  Encounter Date: 08/08/2024     Have you been cared for by a [facility] in the last 3 years and, if so, which description applies to you?    Yes.  I have been here within the last 3 years, and my medical history has NOT changed since that time.  I am FEMALE/of child-bearing potential.    REVIEW OF SYSTEMS:  Have you recently had or currently have any of the following? No changes in my recent health.   PAST MEDICAL HISTORY:  Have you personally ever had or currently have any of the following?  If \"YES,\" then please provide more detail. No changes in my medical history.   HISTORY OF IMMUNOSUPPRESSION: Do you have a history of any of the following:  Systemic Immunosuppression such as Diabetes, Biologic or Immunotherapy, Chemotherapy, Organ Transplantation, Bone Marrow Transplantation?  YES, Diabetes     Answering \"YES\" requires the addition of the dotphrase \"IMMUNOSUPPRESSED\" as the first diagnosis of the patient's visit.   FAMILY HISTORY:  Any \"first degree relatives\" (parent, brother, sister, or child) with the following?    No changes in my family's known health.   PATIENT EXPERIENCE:    Do you want the Dermatologist to perform a COMPLETE skin exam today including a clinical examination under the \"bra and underwear\" areas?  NO  If necessary, do we have your permission to call and leave a detailed message on your Preferred Phone number that includes your specific medical information?  Yes      Allergies   Allergen Reactions    Nsaids Other (See Comments)     Hx of weight loss surgery         Current Outpatient Medications:     cholecalciferol (VITAMIN D3) 1,000 units tablet, Take 1,000 Units by mouth 2 (two) times a day, Disp: , Rfl:     ketoconazole (NIZORAL) 2 % shampoo, Shampoo twice a week for 8 weeks, then as needed.  Leave on for 5 minutes before rinsing.  Rx per Derm., Disp: 120 mL, Rfl: 0    Multiple Vitamin (multivitamin) " capsule, Take 1 capsule by mouth daily, Disp: , Rfl:     omeprazole (PriLOSEC) 20 mg delayed release capsule, TAKE 1 CAPSULE BY MOUTH EVERY DAY, Disp: 90 capsule, Rfl: 1          Whom besides the patient is providing clinical information about today's encounter?   NO ADDITIONAL HISTORIAN (patient alone provided history)    Physical Exam and Assessment/Plan by Diagnosis:     SEBORRHEIC DERMATITIS     Physical Exam:  Anatomic Location Affected &  Morphological Description:  Greasy adherent scale/scaling plaques on the scalp and b/l ears.  Pertinent Positives:  Pertinent Negatives:    Additional History of Present Condition:    Duration: years  Attempted treatments: Ketoconazole 2% shampoo, triamcinolone    Assessment and Plan:  History and physical exam most consistent with seborrheic dermatitis  The chronic nature of this condition and association with normal skin yeast were reviewed.   Educated that the goal of therapy is to control symptoms, but this is not typically something we cure and intermittent flares can be expected.  Based on a thorough discussion of this condition and the management approach to it (including a comprehensive discussion of the known risks, side effects and potential benefits of treatment), the patient (family) agrees to implement the following specific plan:           SCALP  Start OTC anti-dandruff shampoo (Head & Shoulders, Selsun Blue, Neutrogena T-gel or T-Sal) 2x/week to damp scalp, let sit 10 minutes then rinse (may use normal shampoo/conditioner thereafter as desired) and alternate with ketoconazole shampoo (lather for 5 minutes prior to rinsing off; prescribed today)  Start clobetasol 0.05% scalp solution. Apply nightly to scalp for redness/irritation. May use up to 5 times a week. DO NOT apply to face, groin, other areas of body. Given scalp erythema on exam, recommend initiation of steroid solution M-F to dry scalp as needed for redness, itching (do not wash off). Recommend taking  2 days off per week (Saturday/Sunday)         FACE  For flares only, apply tacrolimus 0.1% twice a day for up to 5-7 days at a time as needed. Discussed AE of burning and block box warning. Use this only when you have flares of your rash.  Then, once the rash subsides, use ketoconazole cream daily as a maintenance. This is NOT a steroid, is safe for long-term everyday use. If you use this daily this will keep the rash on your face under control and help prevent flares of the flaking. If your facial scaling is well-controlled, you can try reducing use to a few times a week to prevent recurrence.       Follow up in 3 months      Scribe Attestation      I,:  Sujey Hughes MA am acting as a scribe while in the presence of the attending physician.:       I,:  Harvey Mackenzie MD personally performed the services described in this documentation    as scribed in my presence.:

## 2024-08-27 ENCOUNTER — LAB (OUTPATIENT)
Dept: LAB | Facility: AMBULARY SURGERY CENTER | Age: 54
End: 2024-08-27
Payer: COMMERCIAL

## 2024-08-27 DIAGNOSIS — Z13.1 SCREENING FOR DIABETES MELLITUS: ICD-10-CM

## 2024-08-27 DIAGNOSIS — F41.1 GENERALIZED ANXIETY DISORDER: ICD-10-CM

## 2024-08-27 DIAGNOSIS — E66.9 CLASS 1 OBESITY WITH SERIOUS COMORBIDITY AND BODY MASS INDEX (BMI) OF 34.0 TO 34.9 IN ADULT, UNSPECIFIED OBESITY TYPE: ICD-10-CM

## 2024-08-27 DIAGNOSIS — Z13.6 SCREENING FOR CARDIOVASCULAR CONDITION: ICD-10-CM

## 2024-08-27 DIAGNOSIS — E78.49 OTHER HYPERLIPIDEMIA: ICD-10-CM

## 2024-08-27 DIAGNOSIS — K21.9 GASTROESOPHAGEAL REFLUX DISEASE, UNSPECIFIED WHETHER ESOPHAGITIS PRESENT: ICD-10-CM

## 2024-08-27 DIAGNOSIS — R73.01 IFG (IMPAIRED FASTING GLUCOSE): ICD-10-CM

## 2024-08-27 DIAGNOSIS — E55.9 VITAMIN D DEFICIENCY: ICD-10-CM

## 2024-08-27 LAB
25(OH)D3 SERPL-MCNC: 27.6 NG/ML (ref 30–100)
BASOPHILS # BLD AUTO: 0.04 THOUSANDS/ÂΜL (ref 0–0.1)
BASOPHILS NFR BLD AUTO: 1 % (ref 0–1)
EOSINOPHIL # BLD AUTO: 0.4 THOUSAND/ÂΜL (ref 0–0.61)
EOSINOPHIL NFR BLD AUTO: 7 % (ref 0–6)
ERYTHROCYTE [DISTWIDTH] IN BLOOD BY AUTOMATED COUNT: 12.4 % (ref 11.6–15.1)
EST. AVERAGE GLUCOSE BLD GHB EST-MCNC: 114 MG/DL
HBA1C MFR BLD: 5.6 %
HCT VFR BLD AUTO: 37.1 % (ref 34.8–46.1)
HGB BLD-MCNC: 12.1 G/DL (ref 11.5–15.4)
IMM GRANULOCYTES # BLD AUTO: 0.03 THOUSAND/UL (ref 0–0.2)
IMM GRANULOCYTES NFR BLD AUTO: 1 % (ref 0–2)
LYMPHOCYTES # BLD AUTO: 2.44 THOUSANDS/ÂΜL (ref 0.6–4.47)
LYMPHOCYTES NFR BLD AUTO: 41 % (ref 14–44)
MCH RBC QN AUTO: 31.1 PG (ref 26.8–34.3)
MCHC RBC AUTO-ENTMCNC: 32.6 G/DL (ref 31.4–37.4)
MCV RBC AUTO: 95 FL (ref 82–98)
MONOCYTES # BLD AUTO: 0.45 THOUSAND/ÂΜL (ref 0.17–1.22)
MONOCYTES NFR BLD AUTO: 8 % (ref 4–12)
NEUTROPHILS # BLD AUTO: 2.65 THOUSANDS/ÂΜL (ref 1.85–7.62)
NEUTS SEG NFR BLD AUTO: 42 % (ref 43–75)
NRBC BLD AUTO-RTO: 0 /100 WBCS
PLATELET # BLD AUTO: 266 THOUSANDS/UL (ref 149–390)
PMV BLD AUTO: 10.3 FL (ref 8.9–12.7)
RBC # BLD AUTO: 3.89 MILLION/UL (ref 3.81–5.12)
TSH SERPL DL<=0.05 MIU/L-ACNC: 1.34 UIU/ML (ref 0.45–4.5)
WBC # BLD AUTO: 6.01 THOUSAND/UL (ref 4.31–10.16)

## 2024-08-27 PROCEDURE — 36415 COLL VENOUS BLD VENIPUNCTURE: CPT

## 2024-08-27 PROCEDURE — 83735 ASSAY OF MAGNESIUM: CPT

## 2024-08-27 PROCEDURE — 80053 COMPREHEN METABOLIC PANEL: CPT

## 2024-08-27 PROCEDURE — 80061 LIPID PANEL: CPT

## 2024-08-27 PROCEDURE — 82306 VITAMIN D 25 HYDROXY: CPT

## 2024-08-27 PROCEDURE — 85025 COMPLETE CBC W/AUTO DIFF WBC: CPT

## 2024-08-27 PROCEDURE — 84443 ASSAY THYROID STIM HORMONE: CPT

## 2024-08-27 PROCEDURE — 83036 HEMOGLOBIN GLYCOSYLATED A1C: CPT

## 2024-08-27 PROCEDURE — 83721 ASSAY OF BLOOD LIPOPROTEIN: CPT

## 2024-08-27 NOTE — RESULT ENCOUNTER NOTE
Unstable labs - will review with patient at upcoming appointment.      Low vitamin D - Recommend start multivitamin and over-the-counter vitamin D3 1000 - 3000 International Units daily.

## 2024-08-28 PROBLEM — E66.3 OVERWEIGHT: Status: ACTIVE | Noted: 2024-08-28

## 2024-08-28 LAB
ALBUMIN SERPL BCG-MCNC: 4.1 G/DL (ref 3.5–5)
ALP SERPL-CCNC: 78 U/L (ref 34–104)
ALT SERPL W P-5'-P-CCNC: 9 U/L (ref 7–52)
ANION GAP SERPL CALCULATED.3IONS-SCNC: 10 MMOL/L (ref 4–13)
AST SERPL W P-5'-P-CCNC: 13 U/L (ref 13–39)
BILIRUB SERPL-MCNC: 0.48 MG/DL (ref 0.2–1)
BUN SERPL-MCNC: 11 MG/DL (ref 5–25)
CALCIUM SERPL-MCNC: 9.5 MG/DL (ref 8.4–10.2)
CHLORIDE SERPL-SCNC: 106 MMOL/L (ref 96–108)
CHOLEST SERPL-MCNC: 188 MG/DL
CO2 SERPL-SCNC: 25 MMOL/L (ref 21–32)
CREAT SERPL-MCNC: 0.69 MG/DL (ref 0.6–1.3)
GFR SERPL CREATININE-BSD FRML MDRD: 101 ML/MIN/1.73SQ M
GLUCOSE P FAST SERPL-MCNC: 84 MG/DL (ref 65–99)
HDLC SERPL-MCNC: 58 MG/DL
LDLC SERPL CALC-MCNC: 109 MG/DL (ref 0–100)
LDLC SERPL DIRECT ASSAY-MCNC: 117 MG/DL (ref 0–100)
MAGNESIUM SERPL-MCNC: 2.1 MG/DL (ref 1.9–2.7)
NONHDLC SERPL-MCNC: 130 MG/DL
POTASSIUM SERPL-SCNC: 4.4 MMOL/L (ref 3.5–5.3)
PROT SERPL-MCNC: 6.8 G/DL (ref 6.4–8.4)
SODIUM SERPL-SCNC: 141 MMOL/L (ref 135–147)
TRIGL SERPL-MCNC: 107 MG/DL

## 2024-08-28 NOTE — RESULT ENCOUNTER NOTE
Stable labs - will review with patient at upcoming appointment.    Hyperlipidemia - Recommend lifestyle modifications.    The 10-year ASCVD risk score (Jordan QURESHI, et al., 2019) is: 0.7%    Values used to calculate the score:      Age: 53 years      Sex: Female      Is Non- : No      Diabetic: No      Tobacco smoker: No      Systolic Blood Pressure: 100 mmHg      Is BP treated: No      HDL Cholesterol: 58 mg/dL      Total Cholesterol: 188 mg/dL

## 2024-08-30 ENCOUNTER — OFFICE VISIT (OUTPATIENT)
Dept: FAMILY MEDICINE CLINIC | Facility: CLINIC | Age: 54
End: 2024-08-30
Payer: COMMERCIAL

## 2024-08-30 VITALS
DIASTOLIC BLOOD PRESSURE: 72 MMHG | WEIGHT: 173 LBS | SYSTOLIC BLOOD PRESSURE: 118 MMHG | TEMPERATURE: 97.4 F | RESPIRATION RATE: 16 BRPM | HEIGHT: 65 IN | OXYGEN SATURATION: 99 % | BODY MASS INDEX: 28.82 KG/M2 | HEART RATE: 63 BPM

## 2024-08-30 DIAGNOSIS — Z23 ENCOUNTER FOR IMMUNIZATION: ICD-10-CM

## 2024-08-30 DIAGNOSIS — R76.8 POSITIVE ANA (ANTINUCLEAR ANTIBODY): ICD-10-CM

## 2024-08-30 DIAGNOSIS — R31.29 MICROSCOPIC HEMATURIA: ICD-10-CM

## 2024-08-30 DIAGNOSIS — E55.9 VITAMIN D DEFICIENCY: ICD-10-CM

## 2024-08-30 DIAGNOSIS — Z00.00 ANNUAL PHYSICAL EXAM: Primary | ICD-10-CM

## 2024-08-30 DIAGNOSIS — E78.49 OTHER HYPERLIPIDEMIA: ICD-10-CM

## 2024-08-30 DIAGNOSIS — R73.01 IFG (IMPAIRED FASTING GLUCOSE): ICD-10-CM

## 2024-08-30 DIAGNOSIS — L21.9 SEBORRHEIC DERMATITIS: ICD-10-CM

## 2024-08-30 DIAGNOSIS — E66.3 OVERWEIGHT: ICD-10-CM

## 2024-08-30 DIAGNOSIS — K21.9 GASTROESOPHAGEAL REFLUX DISEASE, UNSPECIFIED WHETHER ESOPHAGITIS PRESENT: ICD-10-CM

## 2024-08-30 DIAGNOSIS — M15.9 OSTEOARTHRITIS OF MULTIPLE JOINTS, UNSPECIFIED OSTEOARTHRITIS TYPE: ICD-10-CM

## 2024-08-30 DIAGNOSIS — F41.1 GENERALIZED ANXIETY DISORDER: ICD-10-CM

## 2024-08-30 PROCEDURE — 99396 PREV VISIT EST AGE 40-64: CPT | Performed by: FAMILY MEDICINE

## 2024-08-30 PROCEDURE — 90471 IMMUNIZATION ADMIN: CPT | Performed by: FAMILY MEDICINE

## 2024-08-30 PROCEDURE — 90715 TDAP VACCINE 7 YRS/> IM: CPT | Performed by: FAMILY MEDICINE

## 2024-08-30 RX ORDER — MULTIVIT-MIN/IRON/FOLIC ACID/K 18-600-40
1 CAPSULE ORAL DAILY
COMMUNITY

## 2024-08-30 NOTE — ASSESSMENT & PLAN NOTE
Stable.  Patient coud not tolerate Metformin XR 500mg 2 pills  QD due to diarrhea.  Recommend lifestyle modifications.

## 2024-08-30 NOTE — ASSESSMENT & PLAN NOTE
Stable s statin.  Recommend lifestyle modifications.    The 10-year ASCVD risk score (Jordan QURESHI, et al., 2019) is: 1%    Values used to calculate the score:      Age: 53 years      Sex: Female      Is Non- : No      Diabetic: No      Tobacco smoker: No      Systolic Blood Pressure: 118 mmHg      Is BP treated: No      HDL Cholesterol: 58 mg/dL      Total Cholesterol: 188 mg/dL

## 2024-08-30 NOTE — PROGRESS NOTES
Assessment/Plan:  Problem List Items Addressed This Visit          Digestive    GERD (gastroesophageal reflux disease)     Management per GI.  On PPI - previously stable s Rx prior to sleeve gastrectomy.  Watch GERD diet.           Relevant Orders    Magnesium       Endocrine    IFG (impaired fasting glucose)     Stable.  Patient coud not tolerate Metformin XR 500mg 2 pills  QD due to diarrhea.  Recommend lifestyle modifications.         Relevant Orders    Comprehensive metabolic panel    Hemoglobin A1C       Musculoskeletal and Integument    Osteoarthritis     Improved s/p Weight Loss.  Stable on Tylenol PRN.           Seborrheic dermatitis     Management per Derm.              Genitourinary    Microscopic hematuria     Resolved.  S/p U/A 6/4/21.              Behavioral Health    Generalized anxiety disorder     Stable without mood medications or counseling.           Relevant Orders    TSH, 3rd generation with Free T4 reflex       Other    Vitamin D deficiency     Stable.  Continue MVI and increase OTC Vitamin D 4000IU daily.           Relevant Orders    Comprehensive metabolic panel    Vitamin D 25 hydroxy    Other hyperlipidemia     Stable s statin.  Recommend lifestyle modifications.    The 10-year ASCVD risk score (Jordan QURESHI, et al., 2019) is: 1%    Values used to calculate the score:      Age: 53 years      Sex: Female      Is Non- : No      Diabetic: No      Tobacco smoker: No      Systolic Blood Pressure: 118 mmHg      Is BP treated: No      HDL Cholesterol: 58 mg/dL      Total Cholesterol: 188 mg/dL           Relevant Orders    Comprehensive metabolic panel    Lipid panel    TSH, 3rd generation with Free T4 reflex    LDL cholesterol, direct    Overweight     Stable.  Management per Weight Management.  Recommend lifestyle modifications.           Positive HANH (antinuclear antibody)     Repeat HANH is negative.  Management per Rheum - F/U PRN.            Other Visit Diagnoses        Annual physical exam    -  Primary    Health Maintenance   Topic Date Due   • PT PLAN OF CARE  04/27/2024   • Annual Physical  08/31/2024   • Influenza Vaccine (1) 09/01/2024   • COVID-19 Vaccine (2 - 2023-24 season) 11/30/2024 (Originally 9/1/2023)   • Zoster Vaccine (1 of 2) 02/28/2025 (Originally 9/16/2022)   • Breast Cancer Screening: Mammogram  01/18/2025   • Depression Screening  08/30/2025   • Cervical Cancer Screening  03/01/2029   • RSV Vaccine Age 60+ Years (1 - 1-dose 60+ series) 09/16/2032   • Colorectal Cancer Screening  11/01/2032   • DTaP,Tdap,and Td Vaccines (3 - Td or Tdap) 08/30/2034   • HIV Screening  Completed   • Hepatitis C Screening  Completed   • RSV Vaccine age 0-20 Months  Aged Out   • Pneumococcal Vaccine: Pediatrics (0 to 5 Years) and At-Risk Patients (6 to 64 Years)  Aged Out   • HIB Vaccine  Aged Out   • IPV Vaccine  Aged Out   • Hepatitis A Vaccine  Aged Out   • Meningococcal ACWY Vaccine  Aged Out   • HPV Vaccine  Aged Out         Encounter for immunization        Relevant Orders    TDAP VACCINE GREATER THAN OR EQUAL TO 6YO IM (Completed)             Return in about 6 months (around 2/28/2025) for 6mo - IFG, HL, Anxiety, Vit D Def, OW Labs;PRN Shingrix #1; 2mo later - Shingrix #2.      Future Appointments   Date Time Provider Department Center   11/8/2024 10:20 AM Clemencia Valenzuela PA-C [facility]   12/13/2024  9:30 AM Christina Lee PA-C [facility]   2/28/2025 10:00 AM Angelica Mcpherson DO [facility]        Subjective:     Tessie is a 53 y.o. female who presents today for a follow-up on her chronic medical conditions.        HPI:  Chief Complaint   Patient presents with   • Physical Exam     -- Above per clinical staff and reviewed. --      HPI      Today:      Return in about 6 months (around 8/29/2024) for Physical / 6mo - IFG, HL, Anxiety, Vit D Def, Obesity, Labs;PRN Shingrix #1; 2mo later - Shingrix #2       6mo OV        Overweight - Management per  Weight Management Dr. Hogan - next appt .  s/p Vertical Sleeve Gastrectomy 23.  Trying to watch diet.  Eating protein, but not enough vegetables.  +Exercise - Walking for 30-45 minutes, 5 times per week; Pool Weights for 30 minutes, 2 times per week; Home Exercises for 15 minutes, 7 times per week.  Active at work in [employer].      IFG / H/O GDM - Diet-controlled.  She stopped taking Metformin XR 500mg 2 pills daily due to diarrhea.         Hyperlipidemia - No statin previously.       GERD / Fatty Liver -  Management per GI Ms. Kristina CARIDAD Zuleta - next appt 3/24.  Stable, except for late night eating.  On Omeprazole 20mg QD s/p bariactric surgery per Weight Management..  Improved s/p Laparoscopic cholecystectomy on 2021 per Dr. Bautista.  Sometimes diarrhea after eating.  Last colonoscopy 24, repeat in 10 years.        OA - Knees and Ankles - Management per Podiatry  - [facility].  Next appt PRN.  Stable on Voltaren gel and PO.  Advised to attend PT to prevent recurrent ankle sprain.       Anxiety -  Patient states mood is stable.  Good social supports.  No SI/HI/AH/VH.  No mood meds or counseling previously.             PHQ-2/9 Depression Screening    Little interest or pleasure in doing things: 0 - not at all  Feeling down, depressed, or hopeless: 0 - not at all  Trouble falling or staying asleep, or sleeping too much: 0 - not at all  Feeling tired or having little energy: 0 - not at all  Poor appetite or overeatin - not at all  Feeling bad about yourself - or that you are a failure or have let yourself or your family down: 0 - not at all  Trouble concentrating on things, such as reading the newspaper or watching television: 0 - not at all  Moving or speaking so slowly that other people could have noticed. Or the opposite - being so fidgety or restless that you have been moving around a lot more than usual: 0 - not at all  Thoughts that you would be better off dead, or of hurting  yourself in some way: 0 - not at all  PHQ-2 Score: 0  PHQ-2 Interpretation: Negative depression screen  PHQ-9 Score: 0  PHQ-9 Interpretation: No or Minimal depression       GRACIELA-7 Flowsheet Screening    Flowsheet Row Most Recent Value   Over the last two weeks, how often have you been bothered by the following problems?     Feeling nervous, anxious, or on edge 0   Not being able to stop or control worrying 0   Worrying too much about different things 0   Trouble relaxing  0   Being so restless that it's hard to sit still 0   Becoming easily annoyed or irritable  0   Feeling afraid as if something awful might happen 0   How difficult have these problems made it for you to do your work, take care of things at home, or get along with other people?  Not difficult at all   GRACIELA Score  0                Vitamin D Deficiency - s/p Drisdol.  Taking MVI and OTC Vitamin D 2000IU daily.        +HANH / OA - Management per Rheum Dr. Toribio - Next appt PRN.  Advises Celebrex if ok per [facility] team.  Negative repeat HANH 6/4/21.  Using Tylenol PRN c benefit.       Seborrheic Dermatitis - Management per Derm Dr. Razo - Next appt 11/24.  On Ketoconazole.      Microscopic Hematuria - No Uro evaluation previously.  Stable U/A 6/4/21.     Fam Hx CAD - Management per Cardio Dr. Cerda - Next appt 10/24.           Reviewed:  Labs 8/27/24, GI 8/31/23, Weight Management 6/14/24, Derm 8/8/24, Rheum 11/9/23, Cardio 10/4/23, Podiatry 10/2/23, Ortho 3/12/24, Gyn 3/1/24     Sees Gyn Feroz Robertson PA-C at [facility] [place] - Next appt 3/25.       Overdue for Dentist.  Sees Optho q2 years.          The following portions of the patient's history were reviewed and updated as appropriate: allergies, current medications, past family history, past medical history, past social history, past surgical history and problem list.      Review of Systems   Constitutional:  Negative for appetite change, chills, diaphoresis,  fatigue and fever.   Respiratory:  Negative for chest tightness and shortness of breath.    Cardiovascular:  Negative for chest pain.   Gastrointestinal:  Negative for abdominal pain, blood in stool, diarrhea, nausea and vomiting.   Genitourinary:  Negative for dysuria.        Current Outpatient Medications   Medication Sig Dispense Refill   • Cholecalciferol (Vitamin D) 50 MCG (2000 UT) CAPS Take 1 capsule by mouth in the morning     • clobetasol (TEMOVATE) 0.05 % external solution Use twice daily M-F. Then use steroid ointment on Sat, Sun. Use only on active areas of psoriasis on body (not on groin, face, scalp). (Patient taking differently: Use twice daily M-F. Then use steroid ointment on Sat, Sun. Use only on active areas of psoriasis on body (not on groin, face, scalp).  Rx per Derm.) 50 mL 3   • ketoconazole (NIZORAL) 2 % cream Apply daily for maintenance (Patient taking differently: Apply daily for maintenance.  Rx per Derm.) 60 g 1   • ketoconazole (NIZORAL) 2 % shampoo Use 2-3x per week on scalp (alternate with over the counter salicylic acid shampoo). Leave on for 5 minutes and then rinse off completely. (Patient taking differently: Use 2-3x per week on scalp (alternate with over the counter salicylic acid shampoo). Leave on for 5 minutes and then rinse off completely.  Rx per Derm.) 120 mL 6   • Multiple Vitamin (multivitamin) capsule Take 1 capsule by mouth daily     • omeprazole (PriLOSEC) 20 mg delayed release capsule TAKE 1 CAPSULE BY MOUTH EVERY DAY (Patient taking differently: Take 20 mg by mouth daily Rx per Weight Management) 90 capsule 1   • tacrolimus (PROTOPIC) 0.1 % ointment Apply twice daily to areas of involvement when flaring. May burn with initial applications. (Patient taking differently: Apply twice daily to areas of involvement when flaring. May burn with initial applications.  Rx per Derm.) 100 g 1     No current facility-administered medications for this visit.       Objective:  BP  "118/72 (BP Location: Left arm, Patient Position: Sitting, Cuff Size: Standard)   Pulse 63   Temp (!) 97.4 °F (36.3 °C) (Temporal)   Resp 16   Ht 5' 4.5\" (1.638 m)   Wt 78.5 kg (173 lb)   SpO2 99%   BMI 29.24 kg/m²    Wt Readings from Last 3 Encounters:   08/30/24 78.5 kg (173 lb)   08/08/24 78.7 kg (173 lb 8 oz)   06/14/24 78.9 kg (174 lb)      BP Readings from Last 3 Encounters:   08/30/24 118/72   06/14/24 100/64   03/20/24 104/60          Physical Exam  Vitals and nursing note reviewed.   Constitutional:       Appearance: Normal appearance. She is well-developed.   HENT:      Head: Normocephalic and atraumatic.      Right Ear: Tympanic membrane, ear canal and external ear normal.      Left Ear: Tympanic membrane, ear canal and external ear normal.      Nose: Nose normal.      Right Sinus: No maxillary sinus tenderness or frontal sinus tenderness.      Left Sinus: No maxillary sinus tenderness or frontal sinus tenderness.      Mouth/Throat:      Mouth: Mucous membranes are moist.      Pharynx: Oropharynx is clear. Uvula midline.      Tonsils: No tonsillar exudate.   Eyes:      Extraocular Movements: Extraocular movements intact.      Conjunctiva/sclera: Conjunctivae normal.      Pupils: Pupils are equal, round, and reactive to light.   Cardiovascular:      Rate and Rhythm: Normal rate and regular rhythm.      Pulses: Normal pulses.      Heart sounds: Normal heart sounds.   Pulmonary:      Effort: Pulmonary effort is normal.      Breath sounds: Normal breath sounds.   Abdominal:      General: Bowel sounds are normal. There is no distension.      Palpations: Abdomen is soft. There is no mass.      Tenderness: There is no abdominal tenderness. There is no guarding or rebound.   Musculoskeletal:         General: No swelling or tenderness.      Cervical back: Neck supple.      Right lower leg: No edema.      Left lower leg: No edema.   Lymphadenopathy:      Cervical: No cervical adenopathy.   Skin:     Findings: " "No rash.   Neurological:      General: No focal deficit present.      Mental Status: She is alert and oriented to person, place, and time.   Psychiatric:         Mood and Affect: Mood normal.         Behavior: Behavior normal.         Thought Content: Thought content normal.         Judgment: Judgment normal.         Lab Results:      Lab Results   Component Value Date    WBC 6.01 08/27/2024    HGB 12.1 08/27/2024    HCT 37.1 08/27/2024     08/27/2024    TRIG 107 08/27/2024    HDL 58 08/27/2024    LDLDIRECT 117 (H) 08/27/2024    ALT 9 08/27/2024    AST 13 08/27/2024    K 4.4 08/27/2024     08/27/2024    CREATININE 0.69 08/27/2024    BUN 11 08/27/2024    CO2 25 08/27/2024    TSH 1.31 09/25/2019    GLUF 84 08/27/2024    HGBA1C 5.6 08/27/2024     Lab Results   Component Value Date    URICACID 5.9 06/04/2021     Invalid input(s): \"BASENAME\" Vitamin D    No results found.     POCT Labs        Depression Screening and Follow-up Plan: Patient was screened for depression during today's encounter. They screened negative with a PHQ-2 score of 0.                    "

## 2024-08-30 NOTE — PATIENT INSTRUCTIONS
"Low vitamin D - Recommend start multivitamin and over-the-counter vitamin D3 4000 International Units daily.      Patient Education     Routine physical for adults   The Basics   Written by the doctors and editors at [facility]   What is a physical? -- A physical is a routine visit, or \"check-up,\" with your doctor. You might also hear it called a \"wellness visit\" or \"preventive visit.\"  During each visit, the doctor will:   Ask about your physical and mental health   Ask about your habits, behaviors, and lifestyle   Do an exam   Give you vaccines if needed   Talk to you about any medicines you take   Give advice about your health   Answer your questions  Getting regular check-ups is an important part of taking care of your health. It can help your doctor find and treat any problems you have. But it's also important for preventing health problems.  A routine physical is different from a \"sick visit.\" A sick visit is when you see a doctor because of a health concern or problem. Since physicals are scheduled ahead of time, you can think about what you want to ask the doctor.  How often should I get a physical? -- It depends on your age and health. In general, for people age 21 years and older:   If you are younger than 50 years, you might be able to get a physical every 3 years.   If you are 50 years or older, your doctor might recommend a physical every year.  If you have an ongoing health condition, like diabetes or high blood pressure, your doctor will probably want to see you more often.  What happens during a physical? -- In general, each visit will include:   Physical exam - The doctor or nurse will check your height, weight, heart rate, and blood pressure. They will also look at your eyes and ears. They will ask about how you are feeling and whether you have any symptoms that bother you.   Medicines - It's a good idea to bring a list of all the medicines you take to each doctor visit. Your doctor will talk to you " "about your medicines and answer any questions. Tell them if you are having any side effects that bother you. You should also tell them if you are having trouble paying for any of your medicines.   Habits and behaviors - This includes:   Your diet   Your exercise habits   Whether you smoke, drink alcohol, or use drugs   Whether you are sexually active   Whether you feel safe at home  Your doctor will talk to you about things you can do to improve your health and lower your risk of health problems. They will also offer help and support. For example, if you want to quit smoking, they can give you advice and might prescribe medicines. If you want to improve your diet or get more physical activity, they can help you with this, too.   Lab tests, if needed - The tests you get will depend on your age and situation. For example, your doctor might want to check your:   Cholesterol   Blood sugar   Iron level   Vaccines - The recommended vaccines will depend on your age, health, and what vaccines you already had. Vaccines are very important because they can prevent certain serious or deadly infections.   Discussion of screening - \"Screening\" means checking for diseases or other health problems before they cause symptoms. Your doctor can recommend screening based on your age, risk, and preferences. This might include tests to check for:   Cancer, such as breast, prostate, cervical, ovarian, colorectal, prostate, lung, or skin cancer   Sexually transmitted infections, such as chlamydia and gonorrhea   Mental health conditions like depression and anxiety  Your doctor will talk to you about the different types of screening tests. They can help you decide which screenings to have. They can also explain what the results might mean.   Answering questions - The physical is a good time to ask the doctor or nurse questions about your health. If needed, they can refer you to other doctors or specialists, too.  Adults older than 65 years " often need other care, too. As you get older, your doctor will talk to you about:   How to prevent falling at home   Hearing or vision tests   Memory testing   How to take your medicines safely   Making sure that you have the help and support you need at home  All topics are updated as new evidence becomes available and our peer review process is complete.  This topic retrieved from [employer] on: May 02, 2024.  Topic 909144 Version 1.0  Release: 32.4.3 - C32.122  © 2024 UpToDate, Inc. and/or its affiliates. All rights reserved.  Consumer Information Use and Disclaimer   Disclaimer: This generalized information is a limited summary of diagnosis, treatment, and/or medication information. It is not meant to be comprehensive and should be used as a tool to help the user understand and/or assess potential diagnostic and treatment options. It does NOT include all information about conditions, treatments, medications, side effects, or risks that may apply to a specific patient. It is not intended to be medical advice or a substitute for the medical advice, diagnosis, or treatment of a health care provider based on the health care provider's examination and assessment of a patient's specific and unique circumstances. Patients must speak with a health care provider for complete information about their health, medical questions, and treatment options, including any risks or benefits regarding use of medications. This information does not endorse any treatments or medications as safe, effective, or approved for treating a specific patient. UpToDate, Inc. and its affiliates disclaim any warranty or liability relating to this information or the use thereof.The use of this information is governed by the Terms of Use, available at https://www.wolterskluwer.com/en/know/clinical-effectiveness-terms. 2024© UpToDate, Inc. and its affiliates and/or licensors. All rights reserved.  Copyright   © 2024 UpToDate, Inc. and/or its affiliates.  "All rights reserved.    Patient Education     High cholesterol   The Basics   Written by the doctors and editors at [facility]   What is cholesterol? -- Cholesterol is a substance found in blood. Everyone has some. It is needed for good health. But people sometimes have too much cholesterol.  Compared with people with normal cholesterol, people with high cholesterol have a higher risk of heart attack, stroke, and other health problems. The higher your cholesterol, the higher your risk of these problems.  Are there different types of cholesterol? -- Yes, there are a few different types. If you get a cholesterol test, you might hear your doctor or nurse talk about:   Total cholesterol   LDL cholesterol - Some people call this the \"bad\" cholesterol. That's because having high LDL levels raises your risk of heart attack, stroke, and other health problems.   HDL cholesterol - Some people call this the \"good\" cholesterol. That's because people with high HDL levels tend to have a lower risk of heart attack, stroke, and other health problems.   Non-HDL cholesterol - Non-HDL cholesterol is your total cholesterol minus your HDL cholesterol.   Triglycerides - Triglycerides are not cholesterol. They are another type of fat. But they often get measured when cholesterol is measured. (Having high triglycerides also seems to increase the risk of heart attack and stroke.)  What should my numbers be? -- Ask your doctor or nurse what your numbers should be. Different people need different goals. If you live outside of the [place], see the table (table 1).  In general, people who do not already have heart disease should aim for:   Total cholesterol below 200   LDL cholesterol below 130, or much lower if they are at risk of heart attack or stroke   HDL cholesterol above 60   Non-HDL cholesterol below 160, or lower if they are at risk of heart attack or stroke   Triglycerides below 150  Remember, though, that many people who cannot meet these " "goals still have a low risk of heart attack and stroke.  What should I do if I have high cholesterol? -- Ask your doctor what your overall risk of heart attack and stroke is. Just having high cholesterol is not always a reason to worry. Having high cholesterol is just one of many things that can increase your risk of heart attack and stroke.  Other things that increase your risk include:   Smoking   High blood pressure   Having a parent or sibling who got heart disease at a young age - Young, in this case, means younger than 55 for males and younger than 65 for females.   A diet that is not heart healthy - A \"heart-healthy\" diet includes lots of fruits and vegetables, fiber, and healthy fats (like those found in fish, nuts, and certain oils). It also means limiting sugar and unhealthy fats.   Older age  If you are at high risk of heart attack and stroke, having high cholesterol is a problem. But if you are at low risk, high cholesterol might not need treatment.  Should I take medicine to lower cholesterol? -- Not everyone who has high cholesterol needs medicines. Your doctor or nurse will decide if you need them based on your age, family history, and other health concerns.  There are many different medicines used to lower cholesterol (table 2). Some help your body make less cholesterol. Some keep your body from absorbing cholesterol from foods. Some help your body get rid of cholesterol faster. The medicines most often used to treat high cholesterol are called \"statins.\"  You should probably take a statin if you:   Already had a heart attack or stroke   Have known heart disease   Have diabetes   Have a condition called \"peripheral artery disease,\" which makes it painful to walk, and happens when the arteries in your legs get clogged with fatty deposits   Have an \"abdominal aortic aneurysm,\" which is a widening of the main artery in the belly  Most people with any of the conditions listed above should take a statin " "no matter what their cholesterol level is. If your doctor or nurse prescribes a statin, it's important to keep taking it. The medicine might not make you feel any different. But it can help prevent heart attack, stroke, and death.  If your doctor or nurse recommends taking medicine to help lower your cholesterol, make sure that you know what it is called. Follow all the instructions for how to take it. For example, some medicines work better when you take them in the evening. Some need to be taken with food.  Tell your doctor or nurse if your medicine causes any side effects that bother you. They might be able to switch you to a different medicine.  Can I lower my cholesterol without medicines? -- Yes. You can help lower your cholesterol by doing these things:   You can lower your LDL, or \"bad,\" cholesterol by avoiding red meat, butter, fried foods, cheese, and other foods that have a lot of saturated fat.   You can lower triglycerides by avoiding sugary foods, fried foods, and excess alcohol.   If you have excess weight, it can help to lose weight. Your doctor or nurse can help you do this in a healthy way.   Try to get regular physical activity. Even gentle forms of exercise, like walking, are good for your health.  Even if these steps don't change your cholesterol very much, they can improve your health in many other ways.  All topics are updated as new evidence becomes available and our peer review process is complete.  This topic retrieved from [employer] on: Mar 01, 2024.  Topic 57657 Version 25.0  Release: 32.2.4 - C32.59  © 2024 UpToDate, Inc. and/or its affiliates. All rights reserved.  table 1: Cholesterol and triglyceride measurements in the [place] and elsewhere     Measurement used within the [place] Milligrams/deciliter (mg/dL)  Measurement used most places outside of the [place] Millimoles/liter (mmol/Liter)     Level to aim for  Level to aim for    Total cholesterol  Below 200 Below 5.17   LDL cholesterol  Below 130, " or much lower if at risk of heart attack and stroke Below 3.36, or much lower if at risk of heart attack and stroke   HDL cholesterol  Above 60 Above 1.55   Triglycerides  Below 150 Below 1.7   Cholesterol is measured differently in the [place] than it is in most other countries. This table shows values used within and outside of the [place]. It includes the cholesterol and triglyceride levels that most people who do not have heart disease should aim for.  Graphic 35731 Version 5.0  table 2: Lipid-lowering medicines  Generic name  Brand name    Statins    Atorvastatin Lipitor   Fluvastatin Lescol, Lescol XL   Lovastatin Mevacor, Altoprev   Pitavastatin Livalo   Pravastatin Pravachol   Rosuvastatin Crestor   Simvastatin Zocor   PCSK9 inhibitors    Alirocumab Praluent   Evolocumab Repatha, Repatha SureClick   Cholesterol absorption inhibitors    Ezetimibe Zetia   Bile acid sequestrants    Cholestyramine Prevalite, Questran, Questran Light   Colesevelam Welchol   Colestipol Colestid   Niacin (nicotinic acid)    Niacin immediate release     Niacin extended release Niaspan   Fibrates    Fenofibrate Fenoglide, Tricor, Triglide, others   Gemfibrozil Lopid   Brand names listed are for medicines available in the [place] and some other countries.  Graphic 33799 Version 7.0  Consumer Information Use and Disclaimer   Disclaimer: This generalized information is a limited summary of diagnosis, treatment, and/or medication information. It is not meant to be comprehensive and should be used as a tool to help the user understand and/or assess potential diagnostic and treatment options. It does NOT include all information about conditions, treatments, medications, side effects, or risks that may apply to a specific patient. It is not intended to be medical advice or a substitute for the medical advice, diagnosis, or treatment of a health care provider based on the health care provider's examination and assessment of a patient's specific and unique  circumstances. Patients must speak with a health care provider for complete information about their health, medical questions, and treatment options, including any risks or benefits regarding use of medications. This information does not endorse any treatments or medications as safe, effective, or approved for treating a specific patient. UpToDate, Inc. and its affiliates disclaim any warranty or liability relating to this information or the use thereof.The use of this information is governed by the Terms of Use, available at https://www.woltersDaylight Digitaluwer.com/en/know/clinical-effectiveness-terms. 2024© UpToDate, Inc. and its affiliates and/or licensors. All rights reserved.  Copyright   © 2024 UpToDate, Inc. and/or its affiliates. All rights reserved.

## 2024-11-06 DIAGNOSIS — Z00.6 ENCOUNTER FOR EXAMINATION FOR NORMAL COMPARISON OR CONTROL IN CLINICAL RESEARCH PROGRAM: ICD-10-CM

## 2024-11-08 ENCOUNTER — OFFICE VISIT (OUTPATIENT)
Dept: DERMATOLOGY | Facility: CLINIC | Age: 54
End: 2024-11-08
Payer: COMMERCIAL

## 2024-11-08 VITALS — WEIGHT: 177 LBS | BODY MASS INDEX: 29.91 KG/M2 | TEMPERATURE: 99.1 F

## 2024-11-08 DIAGNOSIS — L21.9 SEBORRHEIC DERMATITIS OF SCALP: Primary | ICD-10-CM

## 2024-11-08 DIAGNOSIS — L81.1 MELASMA: ICD-10-CM

## 2024-11-08 PROCEDURE — 99214 OFFICE O/P EST MOD 30 MIN: CPT

## 2024-11-08 RX ORDER — CLOBETASOL PROPIONATE 0.5 MG/ML
SOLUTION TOPICAL
Qty: 50 ML | Refills: 3 | Status: CANCELLED | OUTPATIENT
Start: 2024-11-08

## 2024-11-08 RX ORDER — HYDROQUINONE 40 MG/G
CREAM TOPICAL DAILY
Qty: 30 G | Refills: 5 | Status: SHIPPED | OUTPATIENT
Start: 2024-11-08

## 2024-11-08 RX ORDER — KETOCONAZOLE 20 MG/G
CREAM TOPICAL
Qty: 60 G | Refills: 1 | Status: CANCELLED | OUTPATIENT
Start: 2024-11-08

## 2024-11-08 RX ORDER — TACROLIMUS 1 MG/G
OINTMENT TOPICAL
Qty: 100 G | Refills: 1 | Status: CANCELLED | OUTPATIENT
Start: 2024-11-08

## 2024-11-08 RX ORDER — KETOCONAZOLE 20 MG/ML
SHAMPOO TOPICAL
Qty: 120 ML | Refills: 6 | Status: SHIPPED | OUTPATIENT
Start: 2024-11-08

## 2024-11-08 NOTE — PROGRESS NOTES
"[facility]at[facility] Note     Patient Name: Tessie Cason  Encounter Date: 11/8/2024     Have you been cared for by a [facility] Dermatologist in the last 3 years and, if so, which description applies to you?    Yes.  I have been here within the last 3 years, and my medical history has NOT changed since that time.  I am FEMALE/of child-bearing potential.    REVIEW OF SYSTEMS:  Have you recently had or currently have any of the following? No changes in my recent health.   PAST MEDICAL HISTORY:  Have you personally ever had or currently have any of the following?  If \"YES,\" then please provide more detail. No changes in my medical history.   HISTORY OF IMMUNOSUPPRESSION: Do you have a history of any of the following:  Systemic Immunosuppression such as Diabetes, Biologic or Immunotherapy, Chemotherapy, Organ Transplantation, Bone Marrow Transplantation or Prednisone?  No     Answering \"YES\" requires the addition of the dotphrase \"IMMUNOSUPPRESSED\" as the first diagnosis of the patient's visit.   FAMILY HISTORY:  Any \"first degree relatives\" (parent, brother, sister, or child) with the following?    No changes in my family's known health.   PATIENT EXPERIENCE:    Do you want the Dermatologist to perform a COMPLETE skin exam today including a clinical examination under the \"bra and underwear\" areas?  NO  If necessary, do we have your permission to call and leave a detailed message on your Preferred Phone number that includes your specific medical information?  Yes      Allergies   Allergen Reactions    Nsaids Other (See Comments)     Hx of weight loss surgery         Current Outpatient Medications:     Cholecalciferol (Vitamin D) 50 MCG (2000 UT) CAPS, Take 1 capsule by mouth in the morning, Disp: , Rfl:     clobetasol (TEMOVATE) 0.05 % external solution, Use twice daily M-F. Then use steroid ointment on Sat, Sun. Use only on active areas of psoriasis on body (not on groin, face, scalp). (Patient taking " differently: Use twice daily M-F. Then use steroid ointment on Sat, Sun. Use only on active areas of psoriasis on body (not on groin, face, scalp).  Rx per Derm.), Disp: 50 mL, Rfl: 3    ketoconazole (NIZORAL) 2 % cream, Apply daily for maintenance (Patient taking differently: Apply daily for maintenance.  Rx per Derm.), Disp: 60 g, Rfl: 1    ketoconazole (NIZORAL) 2 % shampoo, Use 2-3x per week on scalp (alternate with over the counter salicylic acid shampoo). Leave on for 5 minutes and then rinse off completely. (Patient taking differently: Use 2-3x per week on scalp (alternate with over the counter salicylic acid shampoo). Leave on for 5 minutes and then rinse off completely.  Rx per Derm.), Disp: 120 mL, Rfl: 6    Multiple Vitamin (multivitamin) capsule, Take 1 capsule by mouth daily, Disp: , Rfl:     omeprazole (PriLOSEC) 20 mg delayed release capsule, TAKE 1 CAPSULE BY MOUTH EVERY DAY (Patient taking differently: Take 20 mg by mouth daily Rx per Weight Management), Disp: 90 capsule, Rfl: 1    tacrolimus (PROTOPIC) 0.1 % ointment, Apply twice daily to areas of involvement when flaring. May burn with initial applications. (Patient taking differently: Apply twice daily to areas of involvement when flaring. May burn with initial applications.  Rx per Derm.), Disp: 100 g, Rfl: 1          Whom besides the patient is providing clinical information about today's encounter?   NO ADDITIONAL HISTORIAN (patient alone provided history)    Physical Exam and Assessment/Plan by Diagnosis:    SEBORRHEIC DERMATITIS     Physical Exam:  Anatomic Location Affected &  Morphological Description:  scalp and face clear on exam today  Pertinent Positives:  Pertinent Negatives:     Additional History of Present Condition:  patient doing well since last visit with use of ketoconazole a couple of times per week and clobetasol as needed for flares on scalp; has not needed tacrolimus for face but has continued to consistently use keto cream  for affected areas of face (NLF)    Assessment and Plan:  History and physical exam most consistent with seborrheic dermatitis  The chronic nature of this condition and association with normal skin yeast were reviewed.   Educated that the goal of therapy is to control symptoms, but this is not typically something we cure and intermittent flares can be expected.  Based on a thorough discussion of this condition and the management approach to it (including a comprehensive discussion of the known risks, side effects and potential benefits of treatment), the patient (family) agrees to implement the following specific plan:            SCALP  Start OTC anti-dandruff shampoo (Head & Shoulders, Selsun Blue, Neutrogena T-gel or T-Sal) 2x/week to damp scalp, let sit 10 minutes then rinse (may use normal shampoo/conditioner thereafter as desired) and alternate with ketoconazole shampoo (lather for 5 minutes prior to rinsing off; prescribed today)  Start clobetasol 0.05% scalp solution. Apply nightly to scalp for redness/irritation. May use up to 5 times a week. DO NOT apply to face, groin, other areas of body. Given scalp erythema on exam, recommend initiation of steroid solution M-F to dry scalp as needed for redness, itching (do not wash off). Recommend taking 2 days off per week (Saturday/Sunday)          FACE  For flares only, apply tacrolimus 0.1% twice a day for up to 5-7 days at a time as needed. Discussed AE of burning and block box warning. Use this only when you have flares of your rash.  Then, once the rash subsides, use ketoconazole cream daily as a maintenance. This is NOT a steroid, is safe for long-term everyday use. If you use this daily this will keep the rash on your face under control and help prevent flares of the flaking. If your facial scaling is well-controlled, you can try reducing use to a few times a week to prevent recurrence.         MELASMA    Physical Exam:  Anatomic Location Affected:  superior  "to upper lip  Morphological Description:  light brown hyperpigmented patch  Pertinent Positives:  Pertinent Negatives:    Additional History of Present Condition:  patient reports she gets darkening of the skin above her upper lip in the summer time or with excessive sun exposure. No treatment tried. Does not generally wear sunscreen      Assessment and Plan:  - Discussed that clinical picture and history is most consistent with melasma.   - Educated that melasma is a chronic condition that leads to symmetric brown pigmentation, most commonly in women.   - Educated that the pathogenesis of melasma is thought to be multifactorial with both genetic predisposition and environmental factors and triggers playing a role.   - Discussed that common triggers include sun and ambient light exposure, hormone fluctuations, certain cosmetics and medications that cause phototoxic eruptions, hypothyroidism.  - Educated that the pigment in melasma can be in the upper part of the skin (usually identified by very well defined borders and dark brown color) or involve the deeper layers of the skin (usually identified by ill-defined borders and light brown to blue color). Further discussed that deeper involvement of the skin often suggests poor response to treatments.   -Set expectations that response to treatment in melasma is typically slow and ongoing protection from UV and visible light with iron oxide containing mineral blocker sunscreen is paramount to maintaining good results.   Based on a thorough discussion of this condition and the management approach to it (including a comprehensive discussion of the known risks, side effects and potential benefits of treatment), the patient (family) agrees to implement the following specific plan:  Start mineral sunscreen (Look for sunscreens with titanium dioxide and/or zinc oxide as THE ONLY ingredients listed under the \"active ingredients section\"). For full benefit and protection from " "ambient light, recommend tinted sunscreen with iron oxides (look for this under the longer list of ingredients, typically found below the \"active ingredients\" section). Recommend Colorescience Flex, Colorescience Glow or similar product.   Will send compound cream via mail order [facility] with Hydroquinone 4.5% / Tretinoin 0.025% / Fluocinolone 0.01% / Niacinamide 4% Cream. Patient understands that this is to be used nightly for 3 months on, 1+ months off. Patient understands the small risk of exogenous ochronosis (paradoxical increase in pigmentation) and that adherence to the aforementioned schedule is the best means of preventing this side effect.   You should be contacted within a few days by the pharmacy as discussed via phone, text, or email. They will take your payment information & your medication will be mailed to you.     If you need to contact the pharmacy, their contact information is as follows:[facility] [phone] .      Scribe Attestation      I,:  Esperanza Vasques MA am acting as a scribe while in the presence of the attending physician.:       I,:  Clemencia Valenzuela PA-C personally performed the services described in this documentation    as scribed in my presence.:           "

## 2024-11-11 ENCOUNTER — PATIENT MESSAGE (OUTPATIENT)
Dept: FAMILY MEDICINE CLINIC | Facility: CLINIC | Age: 54
End: 2024-11-11

## 2024-11-13 NOTE — PATIENT COMMUNICATION
Patient was seen by Derm 11/8/24.  Please advise patient to F/U c Derm.      Looking at my last Epic note, I do not see any reference to skin rash advice.  She can try over the counter Lotrimin / Lamisil anti-fungal cream and Gold Bond anti-fungal powder.

## 2024-11-13 NOTE — PATIENT COMMUNICATION
Informed pt per Dr. Mcpherson to follow up with her derm. I provided pt with care instruction in the meantime via [email or web address].

## 2024-11-14 ENCOUNTER — APPOINTMENT (OUTPATIENT)
Dept: LAB | Facility: AMBULARY SURGERY CENTER | Age: 54
End: 2024-11-14

## 2024-11-14 DIAGNOSIS — Z00.6 ENCOUNTER FOR EXAMINATION FOR NORMAL COMPARISON OR CONTROL IN CLINICAL RESEARCH PROGRAM: ICD-10-CM

## 2024-11-14 PROCEDURE — 36415 COLL VENOUS BLD VENIPUNCTURE: CPT

## 2024-11-23 LAB
APOB+LDLR+PCSK9 GENE MUT ANL BLD/T: NOT DETECTED
BRCA1+BRCA2 DEL+DUP + FULL MUT ANL BLD/T: NOT DETECTED
MLH1+MSH2+MSH6+PMS2 GN DEL+DUP+FUL M: NOT DETECTED

## 2024-12-10 ENCOUNTER — TELEPHONE (OUTPATIENT)
Age: 54
End: 2024-12-10

## 2024-12-10 NOTE — TELEPHONE ENCOUNTER
Pt called in to verify if has lab order to be completed before annual visit. Pt was told there is a lab order active since 06/14/24. Pt will complete the lab order before upcoming visit.

## 2024-12-12 ENCOUNTER — RESULTS FOLLOW-UP (OUTPATIENT)
Dept: BARIATRICS | Facility: CLINIC | Age: 54
End: 2024-12-12

## 2024-12-12 ENCOUNTER — APPOINTMENT (OUTPATIENT)
Dept: LAB | Facility: AMBULARY SURGERY CENTER | Age: 54
End: 2024-12-12
Payer: COMMERCIAL

## 2024-12-12 DIAGNOSIS — K91.2 POSTSURGICAL MALABSORPTION: ICD-10-CM

## 2024-12-12 DIAGNOSIS — Z98.84 STATUS POST LAPAROSCOPIC SLEEVE GASTRECTOMY: ICD-10-CM

## 2024-12-12 DIAGNOSIS — E53.8 LOW SERUM VITAMIN B12: Primary | ICD-10-CM

## 2024-12-12 LAB
25(OH)D3 SERPL-MCNC: 32.1 NG/ML (ref 30–100)
ALBUMIN SERPL BCG-MCNC: 4.4 G/DL (ref 3.5–5)
ALP SERPL-CCNC: 76 U/L (ref 34–104)
ALT SERPL W P-5'-P-CCNC: 15 U/L (ref 7–52)
ANION GAP SERPL CALCULATED.3IONS-SCNC: 8 MMOL/L (ref 4–13)
AST SERPL W P-5'-P-CCNC: 19 U/L (ref 13–39)
BILIRUB SERPL-MCNC: 0.59 MG/DL (ref 0.2–1)
BUN SERPL-MCNC: 10 MG/DL (ref 5–25)
CALCIUM SERPL-MCNC: 10.3 MG/DL (ref 8.4–10.2)
CHLORIDE SERPL-SCNC: 103 MMOL/L (ref 96–108)
CHOLEST SERPL-MCNC: 235 MG/DL (ref ?–200)
CO2 SERPL-SCNC: 28 MMOL/L (ref 21–32)
CREAT SERPL-MCNC: 0.7 MG/DL (ref 0.6–1.3)
ERYTHROCYTE [DISTWIDTH] IN BLOOD BY AUTOMATED COUNT: 13 % (ref 11.6–15.1)
FERRITIN SERPL-MCNC: 21 NG/ML (ref 11–307)
GFR SERPL CREATININE-BSD FRML MDRD: 99 ML/MIN/1.73SQ M
GLUCOSE P FAST SERPL-MCNC: 83 MG/DL (ref 65–99)
HCT VFR BLD AUTO: 40.5 % (ref 34.8–46.1)
HDLC SERPL-MCNC: 80 MG/DL
HGB BLD-MCNC: 13.2 G/DL (ref 11.5–15.4)
LDLC SERPL CALC-MCNC: 136 MG/DL (ref 0–100)
MCH RBC QN AUTO: 30.5 PG (ref 26.8–34.3)
MCHC RBC AUTO-ENTMCNC: 32.6 G/DL (ref 31.4–37.4)
MCV RBC AUTO: 94 FL (ref 82–98)
NONHDLC SERPL-MCNC: 155 MG/DL
PLATELET # BLD AUTO: 284 THOUSANDS/UL (ref 149–390)
PMV BLD AUTO: 9.9 FL (ref 8.9–12.7)
POTASSIUM SERPL-SCNC: 4.2 MMOL/L (ref 3.5–5.3)
PROT SERPL-MCNC: 7.5 G/DL (ref 6.4–8.4)
PTH-INTACT SERPL-MCNC: 22.9 PG/ML (ref 12–88)
RBC # BLD AUTO: 4.33 MILLION/UL (ref 3.81–5.12)
SODIUM SERPL-SCNC: 139 MMOL/L (ref 135–147)
TRIGL SERPL-MCNC: 93 MG/DL (ref ?–150)
VIT B12 SERPL-MCNC: 346 PG/ML (ref 180–914)
WBC # BLD AUTO: 6.18 THOUSAND/UL (ref 4.31–10.16)

## 2024-12-12 PROCEDURE — 82607 VITAMIN B-12: CPT

## 2024-12-12 PROCEDURE — 85027 COMPLETE CBC AUTOMATED: CPT

## 2024-12-12 PROCEDURE — 36415 COLL VENOUS BLD VENIPUNCTURE: CPT

## 2024-12-12 PROCEDURE — 80061 LIPID PANEL: CPT

## 2024-12-12 PROCEDURE — 84590 ASSAY OF VITAMIN A: CPT

## 2024-12-12 PROCEDURE — 82306 VITAMIN D 25 HYDROXY: CPT

## 2024-12-12 PROCEDURE — 83970 ASSAY OF PARATHORMONE: CPT

## 2024-12-12 PROCEDURE — 80053 COMPREHEN METABOLIC PANEL: CPT

## 2024-12-12 PROCEDURE — 82728 ASSAY OF FERRITIN: CPT

## 2024-12-12 PROCEDURE — 84425 ASSAY OF VITAMIN B-1: CPT

## 2024-12-13 ENCOUNTER — OFFICE VISIT (OUTPATIENT)
Dept: BARIATRICS | Facility: CLINIC | Age: 54
End: 2024-12-13
Payer: COMMERCIAL

## 2024-12-13 VITALS
BODY MASS INDEX: 29.74 KG/M2 | WEIGHT: 178.5 LBS | TEMPERATURE: 97.5 F | HEIGHT: 65 IN | DIASTOLIC BLOOD PRESSURE: 68 MMHG | HEART RATE: 75 BPM | SYSTOLIC BLOOD PRESSURE: 112 MMHG

## 2024-12-13 DIAGNOSIS — K91.2 POSTSURGICAL MALABSORPTION: ICD-10-CM

## 2024-12-13 DIAGNOSIS — E66.811 OBESITY, CLASS I, BMI 30-34.9: ICD-10-CM

## 2024-12-13 DIAGNOSIS — Z48.815 ENCOUNTER FOR SURGICAL AFTERCARE FOLLOWING SURGERY OF DIGESTIVE SYSTEM: Primary | ICD-10-CM

## 2024-12-13 DIAGNOSIS — Z98.84 BARIATRIC SURGERY STATUS: ICD-10-CM

## 2024-12-13 PROCEDURE — 99214 OFFICE O/P EST MOD 30 MIN: CPT | Performed by: PHYSICIAN ASSISTANT

## 2024-12-13 RX ORDER — AMOXICILLIN 500 MG/1
CAPSULE ORAL
COMMUNITY
Start: 2024-12-11

## 2024-12-13 NOTE — PROGRESS NOTES
Date of surgery:12/12/2023  Procedure: Sleeve  Performing surgeon: Dr. Hogan    Initial Weight - 244lb  Current Weight - 178.5 lb  Aravind Weight - 169lb  Total Body Weight Loss (EWL)- 65.5lb  EWL% - 68%  TWB % -27%

## 2024-12-13 NOTE — PROGRESS NOTES
Assessment/Plan:     Patient ID: Tessie Cason is a 54 y.o. female.     Bariatric Surgery Status    status post sleeve gastrectomy on December 2023 with Dr Hogan   Here for annual doing well without concerns     Continued/Maintain healthy weight loss with good nutrition intakes.  Adequate hydration with at least 64oz. fluid intake.  Follow diet as discussed.  Follow vitamin and mineral recommendations as reviewed with you.  Exercise as tolerated.    Colonoscopy referral made: utd   Mammo: utd     Follow-up in 6 months. We kindly ask that your arrive 15 minutes before your scheduled appointment time with your provider to allow our staff to room you, get your vital signs and update your chart.    Get lab work done . Please call the office if you need a script.  It is recommended to check with your insurance BEFORE getting labs done to make sure they are covered by your policy.      Call our office if you have any problems with abdominal pain especially associated with fever, chills, nausea, vomiting or any other concerns.    All  Post-bariatric surgery patients should be aware that very small quantities of any alcohol can cause impairment and it is very possible not to feel the effect. The effect can be in the system for several hours.  It is also a stomach irritant.     It is advised to AVOID alcohol, Nonsteroidal antiinflammatory drugs (NSAIDS) and nicotine of all forms . Any of these can cause stomach irritation/pain.    Discussed the effects of alcohol on a bariatric patient and the increased impairment risk.     Keep up the good work!     Postsurgical Malabsorption   -At risk for malabsorption of vitamins/minerals secondary to malabsorption and restriction of intake from bariatric surgery  -Currently taking adequate postop bariatric surgery vitamin supplementation  -Last set of bariatric labs completed 12/2024 some still pending   -Patient received education about the importance of adhering to a lifelong  "supplementation regimen to avoid vitamin/mineral deficiencies      Diagnoses and all orders for this visit:    Encounter for surgical aftercare following surgery of digestive system    Bariatric surgery status    Postsurgical malabsorption    Obesity, Class I, BMI 30-34.9    Other orders  -     amoxicillin (AMOXIL) 500 mg capsule         Subjective:      Patient ID: Tessie Cason is a 54 y.o. female.    status post sleeve gastrectomy on December 2023 with Dr Hogan   Here for annual doing well without concerns  Tolerating diet without issues; denies N/V, dysphagia, reflux.    Initial: 244  Current: 178  EWL: (Weight loss is ahead of schedule at this post surgical period.)  Aravind: current   Current BMI is Body mass index is 30.17 kg/m².    Tolerating a regular diet-yes  Eating at least 60 grams of protein per day-yes  Drinking at least 64 ounces of fluid per day-yes  Sufficient exercise-could improve   Using nicotine-no  Using alcohol-no  Supplements: Multivitamins and vit D    EWL is 68%, which places the patient ahead of schedule for expected post surgical weight loss at this time.     The following portions of the patient's history were reviewed and updated as appropriate: allergies, current medications, past family history, past medical history, past social history, past surgical history and problem list.    Review of Systems   Constitutional: Negative.    Respiratory: Negative.     Cardiovascular: Negative.    Gastrointestinal: Negative.    Neurological: Negative.    Psychiatric/Behavioral: Negative.           Objective:    /68 (Patient Position: Sitting, Cuff Size: Large)   Pulse 75   Temp 97.5 °F (36.4 °C) (Tympanic)   Ht 5' 4.5\" (1.638 m)   Wt 81 kg (178 lb 8 oz)   BMI 30.17 kg/m²      Physical Exam  Vitals and nursing note reviewed.   Constitutional:       Appearance: Normal appearance. She is obese.   HENT:      Head: Normocephalic and atraumatic.   Eyes:      Extraocular Movements: " Extraocular movements intact.   Cardiovascular:      Rate and Rhythm: Normal rate.   Pulmonary:      Effort: Pulmonary effort is normal.   Musculoskeletal:         General: Normal range of motion.      Cervical back: Normal range of motion.   Skin:     General: Skin is warm and dry.   Neurological:      General: No focal deficit present.      Mental Status: She is alert and oriented to person, place, and time.   Psychiatric:         Mood and Affect: Mood normal.

## 2024-12-16 LAB — VIT B1 BLD-SCNC: 148.7 NMOL/L (ref 66.5–200)

## 2024-12-18 LAB — VIT A SERPL-MCNC: 33.7 UG/DL (ref 20.1–62)

## 2024-12-19 DIAGNOSIS — L98.7 EXCESS SKIN: ICD-10-CM

## 2024-12-19 DIAGNOSIS — Z98.84 BARIATRIC SURGERY STATUS: Primary | ICD-10-CM

## 2024-12-19 DIAGNOSIS — E66.811 OBESITY, CLASS I, BMI 30-34.9: ICD-10-CM

## 2024-12-19 NOTE — TELEPHONE ENCOUNTER
Patient would like to ask JUANITA if she would still refer her to see a Plastic Surgeon due to her stomach. If yes, please send referral and notify patient. Thank you

## 2024-12-31 ENCOUNTER — OFFICE VISIT (OUTPATIENT)
Dept: PODIATRY | Facility: CLINIC | Age: 54
End: 2024-12-31
Payer: COMMERCIAL

## 2024-12-31 VITALS — WEIGHT: 180 LBS | BODY MASS INDEX: 30.73 KG/M2 | HEIGHT: 64 IN

## 2024-12-31 DIAGNOSIS — M79.672 LEFT FOOT PAIN: ICD-10-CM

## 2024-12-31 DIAGNOSIS — M21.622 TAILOR'S BUNION OF LEFT FOOT: ICD-10-CM

## 2024-12-31 DIAGNOSIS — M21.621 TAILOR'S BUNION OF RIGHT FOOT: ICD-10-CM

## 2024-12-31 DIAGNOSIS — M19.071 OSTEOARTHRITIS OF RIGHT ANKLE AND FOOT: ICD-10-CM

## 2024-12-31 DIAGNOSIS — M19.072 DJD (DEGENERATIVE JOINT DISEASE), ANKLE AND FOOT, LEFT: ICD-10-CM

## 2024-12-31 DIAGNOSIS — M79.671 RIGHT FOOT PAIN: Primary | ICD-10-CM

## 2024-12-31 PROCEDURE — 99203 OFFICE O/P NEW LOW 30 MIN: CPT | Performed by: PODIATRIST

## 2024-12-31 NOTE — PROGRESS NOTES
Name: Tessie Cason      : 1970      MRN: [id number]  Encounter Provider: Cristino Child DPM  Encounter Date: 2024   Encounter department: [facility] [place]    Assessment & Plan     1. Right foot pain  -     XR foot 3+ vw right  -     Ambulatory referral to Physical Therapy; Future  2. Left foot pain  -     XR foot 3+ vw left  -     Ambulatory referral to Physical Therapy; Future  3. Tailor's bunion of right foot  -     Ambulatory referral to Physical Therapy; Future  4. Tailor's bunion of left foot  -     Ambulatory referral to Physical Therapy; Future  5. Osteoarthritis of right ankle and foot  -     Ambulatory referral to Physical Therapy; Future  6. DJD (degenerative joint disease), ankle and foot, left  -     Ambulatory referral to Physical Therapy; Future    My personal interpretation today of the x-rays that were taken show X-rays bilateral foot were reviewed today which show osteoarthritic changes noted within the midfoot plantar calcaneal spur noted osteoarthritic changes at the level of the first metatarsophalangeal joint.  Small accessory ossicle at the level of the navicular tuberosity.  No acute fractures or dislocations noted at this time.  Right foot examination shows osteoarthritic changes noted within the midfoot and dorsal talonavicular joint.  Small plantar and posterior calcaneal spurring noted.      I personally discussed with patient at the visit today:     The diagnosis of this encounter  2.   Possible etiologies of the diagnosis  3.   Treatment options including advantages and disadvantages of treatment with potential risks and complications associated with these treatments  4.   Prevention strategies and ways to decrease pain     I answered all of the patients questions.      Return in about 3 months (around 3/31/2025).    Subjective      bilateral foot pain.  She has pain in the midfoot.  She states when she is pressing off the ground with walking and  "ambulation she has pain and discomfort is tried some over-the-counter insoles.  She does have history of in 2014 surgical invention on the right ankle due to an osteochondral injury.  She had this completed at [facility].  Denies any trauma to the foot or ankle.      Constitutional:  Negative for chills and fever.   Respiratory:  Negative for chest tightness and shortness of breath.    Gastrointestinal:  Negative for nausea and vomiting.     Current Outpatient Medications on File Prior to Visit   Medication Sig   • amoxicillin (AMOXIL) 500 mg capsule    • Cholecalciferol (Vitamin D) 50 MCG (2000 UT) CAPS Take 1 capsule by mouth in the morning   • clobetasol (TEMOVATE) 0.05 % external solution Use twice daily M-F. Then use steroid ointment on Sat, Sun. Use only on active areas of psoriasis on body (not on groin, face, scalp).   • hydroquinone 4 % cream Apply topically daily To affected area only for three months maximum at a time. Then take at least a month break before restarting as needed   • ketoconazole (NIZORAL) 2 % cream Apply daily for maintenance   • ketoconazole (NIZORAL) 2 % shampoo Use 2-3x per week on scalp (alternate with over the counter salicylic acid shampoo). Leave on for 5 minutes and then rinse off completely.   • Multiple Vitamin (multivitamin) capsule Take 1 capsule by mouth daily   • omeprazole (PriLOSEC) 20 mg delayed release capsule TAKE 1 CAPSULE BY MOUTH EVERY DAY   • tacrolimus (PROTOPIC) 0.1 % ointment Apply twice daily to areas of involvement when flaring. May burn with initial applications.       Objective     Ht 5' 4\" (1.626 m)   Wt 81.6 kg (180 lb)   BMI 30.90 kg/m²     Vascular: Intact pedal pulses bilateral DP and PT.  Neurological: Gross protective sensation intact bilateral  Musculoskeletal: Muscle strength bilateral intact with dorsiflexion, inversion, eversion and plantarflexion.  There is some mild discomfort noted on palpation at the midfoot bilaterally however more pain " is noted when she is weightbearing and ambulating.  No signs of open ulcerations or lesions noted currently.  No other areas of acute discomfort or tenderness noted on examination.  Dermatological: No open lesions or ulcerations noted bilateral.

## 2025-02-02 DIAGNOSIS — E66.01 OBESITY, CLASS III, BMI 40-49.9 (MORBID OBESITY) (HCC): ICD-10-CM

## 2025-02-20 ENCOUNTER — EVALUATION (OUTPATIENT)
Dept: PHYSICAL THERAPY | Facility: CLINIC | Age: 55
End: 2025-02-20
Payer: COMMERCIAL

## 2025-02-20 DIAGNOSIS — M19.071 ARTHRITIS OF BOTH ANKLES: Primary | ICD-10-CM

## 2025-02-20 DIAGNOSIS — M19.072 ARTHRITIS OF BOTH ANKLES: Primary | ICD-10-CM

## 2025-02-20 PROCEDURE — 97535 SELF CARE MNGMENT TRAINING: CPT | Performed by: PHYSICAL THERAPIST

## 2025-02-20 PROCEDURE — 97161 PT EVAL LOW COMPLEX 20 MIN: CPT | Performed by: PHYSICAL THERAPIST

## 2025-02-20 NOTE — PROGRESS NOTES
PT Evaluation     Today's date: 2025  Patient name: Tessie Cason  : 1970  MRN: [id number]  Referring provider: Cristino Child,*  Dx:   Encounter Diagnosis     ICD-10-CM    1. Arthritis of both ankles  M19.071     M19.072                      Assessment  Impairments: abnormal gait, abnormal or restricted ROM, activity intolerance, lacks appropriate home exercise program, pain with function, participation limitations and activity limitations    Assessment details: Pt presents with decreased DF ROM a slightly pronated foot, abnormal gait and pain.  Pt will benefit from PT to address impairments and restore function.    Goals  ST visits.  1.  Orthotic will stabilize foot during gait.  2.  Pt will demonstrate understanding of wear schedule.    Plan  Patient would benefit from: orthotics    Planned therapy interventions: home exercise program  Other planned therapy interventions: 2 visits          Subjective Evaluation    History of Present Illness  Mechanism of injury: Pt is a 54 yof presenting to PT for orthotics.  Pt reports pain in the dorsum of both feet, lateral and anterior ankle pain and B 5th MTH pain.  Pt reports standing 11 hours a day at work and would like the orthotics in her sneakers.  Patient Goals  Patient goals for therapy: decreased pain, increased motion, increased strength, independence with ADLs/IADLs, return to sport/leisure activities and return to work    Pain  Current pain ratin  At best pain ratin  At worst pain ratin  Quality: dull ache, sharp and pressure  Relieving factors: rest  Aggravating factors: standing and walking  Progression: worsening          Objective     Passive Range of Motion   Left Ankle/Foot    Dorsiflexion (ke): 3 degrees   Plantar flexion: 65 degrees   Inversion: 35 degrees   Eversion: 10 degrees   Great toe extension: 45 degrees     Right Ankle/Foot    Dorsiflexion (ke): 0 degrees with pain  Plantar flexion: 65 degrees   Inversion:  "35 degrees   Eversion: 10 degrees   Great toe extension: 50 degrees     Additional Passive Range of Motion Details  Gait: mod R hip ER t/o stance with midfoot push-off.  Foot posture index: R: 7, L: 6             Precautions:       Manuals 2/20                                                                Neuro Re-Ed                                                                                                        Ther Ex             Standing GA stretch 10\"x3 ea                                                                                                       Ther Activity                                       Gait Training             education 3 min                         Modalities                                            "

## 2025-03-04 ENCOUNTER — TELEPHONE (OUTPATIENT)
Dept: PLASTIC SURGERY | Facility: CLINIC | Age: 55
End: 2025-03-04

## 2025-06-02 ENCOUNTER — TELEPHONE (OUTPATIENT)
Dept: BARIATRICS | Facility: CLINIC | Age: 55
End: 2025-06-02

## 2025-06-02 NOTE — TELEPHONE ENCOUNTER
LVM for pt in regards to an appt 7/11/25 at 8:30 am with Christina. Provider will be out of office and appt needed to be rescheduled. Pt advised to call office to reschedule.

## 2025-06-04 ENCOUNTER — TELEPHONE (OUTPATIENT)
Dept: BARIATRICS | Facility: CLINIC | Age: 55
End: 2025-06-04

## 2025-06-04 NOTE — TELEPHONE ENCOUNTER
LVM for pt in regards to an appt 7/11/25 at 8:30 am with Christina. Provider will be out of office and appt needed to be rescheduled. Pt advised to call office to reschedule. Pt also sent MyChart.

## 2025-06-06 NOTE — TELEPHONE ENCOUNTER
LVM for pt in regards to an appt 7/11/25 at 8:30 am with Christina. Provider will be out of office and appt needed to be rescheduled. Pt advised to call office to reschedule. Third attempt at rescheduling, appt cancelled. Pt also sent cancellation letter.

## 2025-07-17 NOTE — PROGRESS NOTES
"Daily Note     Today's date: 2025  Patient name: Tessie Cason  : 1970  MRN: [id number]  Referring provider: Cristino Child,*  Dx: No diagnosis found.             Subjective: Pt reports feeling about the same since IE. She understands break in period for new orthotics.         Objective: See treatment diary below        Assessment:      Issued performance graphite with MLA arch support and medial posting orthotics bilaterally. Foot posture is significant improved standing in orthotics on ground. Gait mechanics are significantly improved with controlled pronation and MLA maintained throughout when ambulating with new orthotics.      Orthotics fit well within shoe.     Patient was also educated on shoe wear and device, break-in period, and skin checks to avoid potential skin break down. Verbalizes good understanding.         Plan: Follow up as needed.       Manuals                                                                 Neuro Re-Ed                                                                                                        Ther Ex             Standing GA stretch 10\"x3 ea                                                                                                       Ther Activity                                       Gait Training             education 3 min                         Modalities                                            "

## 2025-07-18 ENCOUNTER — OFFICE VISIT (OUTPATIENT)
Dept: PHYSICAL THERAPY | Facility: CLINIC | Age: 55
End: 2025-07-18
Payer: COMMERCIAL

## 2025-07-18 DIAGNOSIS — M19.071 ARTHRITIS OF BOTH ANKLES: Primary | ICD-10-CM

## 2025-07-18 DIAGNOSIS — M19.072 ARTHRITIS OF BOTH ANKLES: Primary | ICD-10-CM

## 2025-07-18 PROCEDURE — L3010 FOOT LONGITUDINAL ARCH SUPPO: HCPCS

## (undated) DEVICE — VISUALIZATION SYSTEM: Brand: CLEARIFY

## (undated) DEVICE — KIT, ROBOTIC BARIATRIC: Brand: CARDINAL HEALTH

## (undated) DEVICE — PACK PBDS LAP CHOLE RF

## (undated) DEVICE — STAPLER 60 RELOAD GREEN: Brand: SUREFORM

## (undated) DEVICE — GAUZE SPONGES,16 PLY: Brand: CURITY

## (undated) DEVICE — VISIGI 3D®  CALIBRATION SYSTEM  SIZE 36FR SLEEVE/STD: Brand: BOEHRINGER® VISIGI 3D™ SLEEVE GASTRECTOMY CALIBRATION SYSTEM, SIZE 36FR

## (undated) DEVICE — SEAMGUARD STPL REINF INTUITIVE SUREFORM 60 GREEN: Type: IMPLANTABLE DEVICE | Site: ABDOMEN | Status: NON-FUNCTIONAL

## (undated) DEVICE — [HIGH FLOW INSUFFLATOR,  DO NOT USE IF PACKAGE IS DAMAGED,  KEEP DRY,  KEEP AWAY FROM SUNLIGHT,  PROTECT FROM HEAT AND RADIOACTIVE SOURCES.]: Brand: PNEUMOSURE

## (undated) DEVICE — VIOLET BRAIDED (POLYGLACTIN 910), SYNTHETIC ABSORBABLE SUTURE: Brand: COATED VICRYL

## (undated) DEVICE — SUT VICRYL 3-0 SH 27 IN J416H

## (undated) DEVICE — TROCAR: Brand: KII FIOS FIRST ENTRY

## (undated) DEVICE — BLADELESS OBTURATOR: Brand: WECK VISTA

## (undated) DEVICE — GLOVE INDICATOR PI UNDERGLOVE SZ 8 BLUE

## (undated) DEVICE — STAPLER 60 RELOAD BLACK: Brand: SUREFORM

## (undated) DEVICE — TUBING SMOKE EVAC W/FILTRATION DEVICE PLUMEPORT ACTIV

## (undated) DEVICE — INTENDED FOR TISSUE SEPARATION, AND OTHER PROCEDURES THAT REQUIRE A SHARP SURGICAL BLADE TO PUNCTURE OR CUT.: Brand: BARD-PARKER SAFETY BLADES SIZE 15, STERILE

## (undated) DEVICE — MEGA SUTURECUT ND: Brand: ENDOWRIST

## (undated) DEVICE — SEAL

## (undated) DEVICE — TROCARS: Brand: KII® BALLOON BLUNT TIP SYSTEM

## (undated) DEVICE — 3000CC GUARDIAN II: Brand: GUARDIAN

## (undated) DEVICE — INSUFFLATION NEEDLE TO ESTABLISH PNEUMOPERITONEUM.: Brand: INSUFFLATION NEEDLE

## (undated) DEVICE — Device

## (undated) DEVICE — PADS GROUND

## (undated) DEVICE — STAPLER 60 RELOAD BLUE: Brand: SUREFORM

## (undated) DEVICE — 3M™ TEGADERM™ TRANSPARENT FILM DRESSING FRAME STYLE, 1626W, 4 IN X 4-3/4 IN (10 CM X 12 CM), 50/CT 4CT/CASE: Brand: 3M™ TEGADERM™

## (undated) DEVICE — IRRIG ENDO FLO TUBING

## (undated) DEVICE — DECANTER: Brand: UNBRANDED

## (undated) DEVICE — VESSEL SEALER EXTEND: Brand: ENDOWRIST

## (undated) DEVICE — PAD GROUNDING ADULT

## (undated) DEVICE — DISPOSABLE OR TOWEL: Brand: CARDINAL HEALTH

## (undated) DEVICE — GLOVE SRG BIOGEL ECLIPSE 7.5

## (undated) DEVICE — SUT MONOCRYL 4-0 PS-2 27 IN Y426H

## (undated) DEVICE — HYDROPHILIC WOUND DRESSING WITH ZINC PLUS VITAMINS A AND B6.: Brand: DERMAGRAN®-B

## (undated) DEVICE — SUT VICRYL 0 UR-6 27 IN J603H

## (undated) DEVICE — GLOVE SRG BIOGEL ECLIPSE 8

## (undated) DEVICE — PENCIL ELECTROSURG E-Z CLEAN -0035H

## (undated) DEVICE — 2000CC GUARDIAN II: Brand: GUARDIAN

## (undated) DEVICE — COLUMN DRAPE

## (undated) DEVICE — CADIERE FORCEPS: Brand: ENDOWRIST

## (undated) DEVICE — TOWEL SURG XR DETECT GREEN STRL RFD

## (undated) DEVICE — TROCAR: Brand: KII® SLEEVE

## (undated) DEVICE — NEEDLE 22 G X 1 1/2 SAFETY

## (undated) DEVICE — ADHESIVE SKIN HIGH VISCOSITY EXOFIN 1ML

## (undated) DEVICE — CHLORAPREP HI-LITE 26ML ORANGE

## (undated) DEVICE — REDUCER: Brand: ENDOWRIST

## (undated) DEVICE — DRAPE EQUIPMENT RF WAND

## (undated) DEVICE — ADHESIVE SKIN HIGH VISCOSITY EXOFIN MICRO 0.5ML

## (undated) DEVICE — PLUMEPEN PRO 10FT

## (undated) DEVICE — POWER SHELL: Brand: SIGNIA

## (undated) DEVICE — BETHLEHEM UNIVERSAL MINOR GEN: Brand: CARDINAL HEALTH

## (undated) DEVICE — CANNULA SEAL

## (undated) DEVICE — Device: Brand: DEFENDO AIR/WATER/SUCTION AND BIOPSY VALVE

## (undated) DEVICE — SUT ETHIBOND 1 CT-1 30 IN X425H

## (undated) DEVICE — STAPLER 60: Brand: SUREFORM

## (undated) DEVICE — TROCARS: Brand: KII® OPTICAL ACCESS SYSTEM

## (undated) DEVICE — LIGAMAX 5 MM ENDOSCOPIC MULTIPLE CLIP APPLIER: Brand: LIGAMAX

## (undated) DEVICE — ENDOPOUCH RETRIEVER SPECIMEN RETRIEVAL BAGS: Brand: ENDOPOUCH RETRIEVER

## (undated) DEVICE — ARM DRAPE

## (undated) DEVICE — LIGHT HANDLE COVER SLEEVE DISP BLUE STELLAR